# Patient Record
Sex: FEMALE | Race: WHITE | NOT HISPANIC OR LATINO | Employment: FULL TIME | URBAN - METROPOLITAN AREA
[De-identification: names, ages, dates, MRNs, and addresses within clinical notes are randomized per-mention and may not be internally consistent; named-entity substitution may affect disease eponyms.]

---

## 2017-07-14 ENCOUNTER — COMMUNICATION - HEALTHEAST (OUTPATIENT)
Dept: FAMILY MEDICINE | Facility: CLINIC | Age: 27
End: 2017-07-14

## 2017-09-29 ENCOUNTER — COMMUNICATION - HEALTHEAST (OUTPATIENT)
Dept: FAMILY MEDICINE | Facility: CLINIC | Age: 27
End: 2017-09-29

## 2017-10-02 ENCOUNTER — COMMUNICATION - HEALTHEAST (OUTPATIENT)
Dept: TELEHEALTH | Facility: CLINIC | Age: 27
End: 2017-10-02

## 2017-10-02 ENCOUNTER — RECORDS - HEALTHEAST (OUTPATIENT)
Dept: GENERAL RADIOLOGY | Facility: CLINIC | Age: 27
End: 2017-10-02

## 2017-10-02 ENCOUNTER — OFFICE VISIT - HEALTHEAST (OUTPATIENT)
Dept: FAMILY MEDICINE | Facility: CLINIC | Age: 27
End: 2017-10-02

## 2017-10-02 DIAGNOSIS — R07.9 CHEST PAIN, UNSPECIFIED TYPE: ICD-10-CM

## 2017-10-02 DIAGNOSIS — R07.9 CHEST PAIN, UNSPECIFIED: ICD-10-CM

## 2017-10-02 ASSESSMENT — MIFFLIN-ST. JEOR: SCORE: 1407.62

## 2017-10-15 ENCOUNTER — COMMUNICATION - HEALTHEAST (OUTPATIENT)
Dept: FAMILY MEDICINE | Facility: CLINIC | Age: 27
End: 2017-10-15

## 2018-04-11 ENCOUNTER — RECORDS - HEALTHEAST (OUTPATIENT)
Dept: ADMINISTRATIVE | Facility: OTHER | Age: 28
End: 2018-04-11

## 2018-09-10 ENCOUNTER — COMMUNICATION - HEALTHEAST (OUTPATIENT)
Dept: FAMILY MEDICINE | Facility: CLINIC | Age: 28
End: 2018-09-10

## 2018-09-12 ENCOUNTER — OFFICE VISIT - HEALTHEAST (OUTPATIENT)
Dept: FAMILY MEDICINE | Facility: CLINIC | Age: 28
End: 2018-09-12

## 2018-09-12 DIAGNOSIS — Z34.90 SUPERVISION OF NORMAL PREGNANCY: ICD-10-CM

## 2018-09-12 DIAGNOSIS — N91.2 AMENORRHEA: ICD-10-CM

## 2018-09-12 LAB — HCG UR QL: POSITIVE

## 2018-10-02 ENCOUNTER — RECORDS - HEALTHEAST (OUTPATIENT)
Dept: ADMINISTRATIVE | Facility: OTHER | Age: 28
End: 2018-10-02

## 2018-10-05 ENCOUNTER — HOSPITAL ENCOUNTER (OUTPATIENT)
Dept: ULTRASOUND IMAGING | Facility: CLINIC | Age: 28
Discharge: HOME | End: 2018-10-05
Attending: FAMILY MEDICINE

## 2018-10-05 DIAGNOSIS — Z34.90 SUPERVISION OF NORMAL PREGNANCY: ICD-10-CM

## 2018-10-20 ENCOUNTER — RECORDS - HEALTHEAST (OUTPATIENT)
Dept: ADMINISTRATIVE | Facility: OTHER | Age: 28
End: 2018-10-20

## 2018-11-02 ENCOUNTER — PRENATAL OFFICE VISIT - HEALTHEAST (OUTPATIENT)
Dept: FAMILY MEDICINE | Facility: CLINIC | Age: 28
End: 2018-11-02

## 2018-11-02 DIAGNOSIS — Z34.90 SUPERVISION OF NORMAL PREGNANCY: ICD-10-CM

## 2018-11-02 LAB
ALBUMIN UR-MCNC: NEGATIVE MG/DL
APPEARANCE UR: CLEAR
BASOPHILS # BLD AUTO: 0 THOU/UL (ref 0–0.2)
BASOPHILS NFR BLD AUTO: 0 % (ref 0–2)
BILIRUB UR QL STRIP: NEGATIVE
COLOR UR AUTO: YELLOW
EOSINOPHIL # BLD AUTO: 0.1 THOU/UL (ref 0–0.4)
EOSINOPHIL NFR BLD AUTO: 1 % (ref 0–6)
ERYTHROCYTE [DISTWIDTH] IN BLOOD BY AUTOMATED COUNT: 13.2 % (ref 11–14.5)
GLUCOSE UR STRIP-MCNC: NEGATIVE MG/DL
HCT VFR BLD AUTO: 40.4 % (ref 35–47)
HGB BLD-MCNC: 13.4 G/DL (ref 12–16)
HGB UR QL STRIP: NEGATIVE
HIV 1+2 AB+HIV1 P24 AG SERPL QL IA: NEGATIVE
KETONES UR STRIP-MCNC: NEGATIVE MG/DL
LEUKOCYTE ESTERASE UR QL STRIP: NEGATIVE
LYMPHOCYTES # BLD AUTO: 2.1 THOU/UL (ref 0.8–4.4)
LYMPHOCYTES NFR BLD AUTO: 26 % (ref 20–40)
MCH RBC QN AUTO: 28.5 PG (ref 27–34)
MCHC RBC AUTO-ENTMCNC: 33.2 G/DL (ref 32–36)
MCV RBC AUTO: 86 FL (ref 80–100)
MONOCYTES # BLD AUTO: 0.8 THOU/UL (ref 0–0.9)
MONOCYTES NFR BLD AUTO: 9 % (ref 2–10)
NEUTROPHILS # BLD AUTO: 5.2 THOU/UL (ref 2–7.7)
NEUTROPHILS NFR BLD AUTO: 63 % (ref 50–70)
NITRATE UR QL: NEGATIVE
PH UR STRIP: 7 [PH] (ref 5–8)
PLATELET # BLD AUTO: 259 THOU/UL (ref 140–440)
PMV BLD AUTO: 10.7 FL (ref 8.5–12.5)
RBC # BLD AUTO: 4.7 MILL/UL (ref 3.8–5.4)
SP GR UR STRIP: 1.01 (ref 1–1.03)
TSH SERPL DL<=0.005 MIU/L-ACNC: 2 UIU/ML (ref 0.3–5)
UROBILINOGEN UR STRIP-ACNC: NORMAL
WBC: 8.3 THOU/UL (ref 4–11)

## 2018-11-03 LAB
ANTIBODY SCREEN: NEGATIVE
BACTERIA SPEC CULT: NO GROWTH
HBV SURFACE AG SERPL QL IA: NEGATIVE
T PALLIDUM AB SER QL: NEGATIVE

## 2018-11-05 LAB
25(OH)D3 SERPL-MCNC: 27.6 NG/ML (ref 30–80)
ABO/RH(D): NORMAL
ABORH REPEAT: NORMAL
C TRACH DNA SPEC QL PROBE+SIG AMP: NEGATIVE
HPV SOURCE: NORMAL
HUMAN PAPILLOMA VIRUS 16 DNA: NEGATIVE
HUMAN PAPILLOMA VIRUS 18 DNA: NEGATIVE
HUMAN PAPILLOMA VIRUS FINAL DIAGNOSIS: NORMAL
HUMAN PAPILLOMA VIRUS OTHER HR: NEGATIVE
N GONORRHOEA DNA SPEC QL NAA+PROBE: NEGATIVE
RUBV IGG SERPL QL IA: POSITIVE
SPECIMEN DESCRIPTION: NORMAL

## 2018-11-07 ENCOUNTER — COMMUNICATION - HEALTHEAST (OUTPATIENT)
Dept: FAMILY MEDICINE | Facility: CLINIC | Age: 28
End: 2018-11-07

## 2018-11-12 ENCOUNTER — COMMUNICATION - HEALTHEAST (OUTPATIENT)
Dept: FAMILY MEDICINE | Facility: CLINIC | Age: 28
End: 2018-11-12

## 2018-11-30 ENCOUNTER — RECORDS - HEALTHEAST (OUTPATIENT)
Dept: ADMINISTRATIVE | Facility: OTHER | Age: 28
End: 2018-11-30

## 2018-11-30 ENCOUNTER — PRENATAL OFFICE VISIT - HEALTHEAST (OUTPATIENT)
Dept: FAMILY MEDICINE | Facility: CLINIC | Age: 28
End: 2018-11-30

## 2018-11-30 ENCOUNTER — COMMUNICATION - HEALTHEAST (OUTPATIENT)
Dept: FAMILY MEDICINE | Facility: CLINIC | Age: 28
End: 2018-11-30

## 2018-11-30 DIAGNOSIS — Z34.82 ENCOUNTER FOR SUPERVISION OF OTHER NORMAL PREGNANCY IN SECOND TRIMESTER: ICD-10-CM

## 2018-12-03 ENCOUNTER — AMBULATORY - HEALTHEAST (OUTPATIENT)
Dept: MATERNAL FETAL MEDICINE | Facility: HOSPITAL | Age: 28
End: 2018-12-03

## 2018-12-03 DIAGNOSIS — Z34.90 PRENATAL CARE, ANTEPARTUM: ICD-10-CM

## 2018-12-05 ENCOUNTER — COMMUNICATION - HEALTHEAST (OUTPATIENT)
Dept: FAMILY MEDICINE | Facility: CLINIC | Age: 28
End: 2018-12-05

## 2018-12-08 ENCOUNTER — COMMUNICATION - HEALTHEAST (OUTPATIENT)
Dept: SCHEDULING | Facility: CLINIC | Age: 28
End: 2018-12-08

## 2018-12-17 ENCOUNTER — AMBULATORY - HEALTHEAST (OUTPATIENT)
Dept: MATERNAL FETAL MEDICINE | Facility: HOSPITAL | Age: 28
End: 2018-12-17

## 2018-12-21 ENCOUNTER — RECORDS - HEALTHEAST (OUTPATIENT)
Dept: ADMINISTRATIVE | Facility: OTHER | Age: 28
End: 2018-12-21

## 2018-12-21 ENCOUNTER — RECORDS - HEALTHEAST (OUTPATIENT)
Dept: ULTRASOUND IMAGING | Facility: HOSPITAL | Age: 28
End: 2018-12-21

## 2018-12-21 ENCOUNTER — OFFICE VISIT - HEALTHEAST (OUTPATIENT)
Dept: MATERNAL FETAL MEDICINE | Facility: HOSPITAL | Age: 28
End: 2018-12-21

## 2018-12-21 DIAGNOSIS — Z34.90 ENCOUNTER FOR SUPERVISION OF NORMAL PREGNANCY, UNSPECIFIED, UNSPECIFIED TRIMESTER: ICD-10-CM

## 2018-12-21 DIAGNOSIS — Z36.86 ENCOUNTER FOR ANTENATAL SCREENING FOR CERVICAL LENGTH: ICD-10-CM

## 2018-12-21 DIAGNOSIS — O09.892 HISTORY OF PRETERM DELIVERY, CURRENTLY PREGNANT IN SECOND TRIMESTER: ICD-10-CM

## 2018-12-26 ENCOUNTER — COMMUNICATION - HEALTHEAST (OUTPATIENT)
Dept: FAMILY MEDICINE | Facility: CLINIC | Age: 28
End: 2018-12-26

## 2018-12-28 ENCOUNTER — RECORDS - HEALTHEAST (OUTPATIENT)
Dept: ADMINISTRATIVE | Facility: OTHER | Age: 28
End: 2018-12-28

## 2018-12-31 ENCOUNTER — PRENATAL OFFICE VISIT - HEALTHEAST (OUTPATIENT)
Dept: FAMILY MEDICINE | Facility: CLINIC | Age: 28
End: 2018-12-31

## 2018-12-31 DIAGNOSIS — Z34.82 ENCOUNTER FOR SUPERVISION OF OTHER NORMAL PREGNANCY IN SECOND TRIMESTER: ICD-10-CM

## 2018-12-31 DIAGNOSIS — M25.531 BILATERAL WRIST PAIN: ICD-10-CM

## 2018-12-31 DIAGNOSIS — M25.532 BILATERAL WRIST PAIN: ICD-10-CM

## 2019-01-24 ENCOUNTER — OFFICE VISIT - HEALTHEAST (OUTPATIENT)
Dept: FAMILY MEDICINE | Facility: CLINIC | Age: 29
End: 2019-01-24

## 2019-01-24 DIAGNOSIS — R39.9 UTI SYMPTOMS: ICD-10-CM

## 2019-01-24 LAB
ALBUMIN UR-MCNC: NEGATIVE MG/DL
AMORPH CRY #/AREA URNS HPF: ABNORMAL /[HPF]
APPEARANCE UR: ABNORMAL
BACTERIA #/AREA URNS HPF: ABNORMAL HPF
BILIRUB UR QL STRIP: NEGATIVE
COLOR UR AUTO: YELLOW
GLUCOSE UR STRIP-MCNC: NEGATIVE MG/DL
HGB UR QL STRIP: NEGATIVE
KETONES UR STRIP-MCNC: NEGATIVE MG/DL
LEUKOCYTE ESTERASE UR QL STRIP: ABNORMAL
MUCOUS THREADS #/AREA URNS LPF: ABNORMAL LPF
NITRATE UR QL: NEGATIVE
PH UR STRIP: 7 [PH] (ref 5–8)
RBC #/AREA URNS AUTO: ABNORMAL HPF
SP GR UR STRIP: 1.02 (ref 1–1.03)
SQUAMOUS #/AREA URNS AUTO: ABNORMAL LPF
UROBILINOGEN UR STRIP-ACNC: ABNORMAL
WBC #/AREA URNS AUTO: ABNORMAL HPF

## 2019-01-24 ASSESSMENT — MIFFLIN-ST. JEOR: SCORE: 1536.89

## 2019-01-25 LAB — BACTERIA SPEC CULT: NO GROWTH

## 2019-01-28 ENCOUNTER — PRENATAL OFFICE VISIT - HEALTHEAST (OUTPATIENT)
Dept: FAMILY MEDICINE | Facility: CLINIC | Age: 29
End: 2019-01-28

## 2019-01-28 DIAGNOSIS — Z34.82 ENCOUNTER FOR SUPERVISION OF OTHER NORMAL PREGNANCY IN SECOND TRIMESTER: ICD-10-CM

## 2019-01-28 DIAGNOSIS — O28.3 ABNORMAL FETAL ULTRASOUND OF SPINE: ICD-10-CM

## 2019-01-28 LAB
ALBUMIN UR-MCNC: NEGATIVE MG/DL
AMORPH CRY #/AREA URNS HPF: ABNORMAL /[HPF]
APPEARANCE UR: ABNORMAL
BACTERIA #/AREA URNS HPF: ABNORMAL HPF
BILIRUB UR QL STRIP: NEGATIVE
COLOR UR AUTO: YELLOW
FASTING STATUS PATIENT QL REPORTED: NORMAL
GLUCOSE 1H P 50 G GLC PO SERPL-MCNC: 124 MG/DL (ref 70–139)
GLUCOSE UR STRIP-MCNC: NEGATIVE MG/DL
HGB BLD-MCNC: 11.5 G/DL (ref 12–16)
HGB UR QL STRIP: NEGATIVE
KETONES UR STRIP-MCNC: NEGATIVE MG/DL
LEUKOCYTE ESTERASE UR QL STRIP: ABNORMAL
MUCOUS THREADS #/AREA URNS LPF: ABNORMAL LPF
NITRATE UR QL: NEGATIVE
PH UR STRIP: 7 [PH] (ref 5–8)
RBC #/AREA URNS AUTO: ABNORMAL HPF
SP GR UR STRIP: 1.01 (ref 1–1.03)
SQUAMOUS #/AREA URNS AUTO: ABNORMAL LPF
UROBILINOGEN UR STRIP-ACNC: ABNORMAL
WBC #/AREA URNS AUTO: ABNORMAL HPF

## 2019-01-29 LAB — BACTERIA SPEC CULT: NO GROWTH

## 2019-01-30 LAB — 25(OH)D3 SERPL-MCNC: 38.8 NG/ML (ref 30–80)

## 2019-02-01 ENCOUNTER — HOSPITAL ENCOUNTER (OUTPATIENT)
Dept: ULTRASOUND IMAGING | Facility: CLINIC | Age: 29
Discharge: HOME | End: 2019-02-01
Attending: FAMILY MEDICINE

## 2019-02-01 DIAGNOSIS — O28.3 ABNORMAL FETAL ULTRASOUND OF SPINE: ICD-10-CM

## 2019-02-01 DIAGNOSIS — O28.9 UNSPECIFIED ABNORMAL FINDINGS ON ANTENATAL SCREENING OF MOTHER: ICD-10-CM

## 2019-02-01 DIAGNOSIS — Z34.82 ENCOUNTER FOR SUPERVISION OF OTHER NORMAL PREGNANCY IN SECOND TRIMESTER: ICD-10-CM

## 2019-02-20 ENCOUNTER — PRENATAL OFFICE VISIT - HEALTHEAST (OUTPATIENT)
Dept: FAMILY MEDICINE | Facility: CLINIC | Age: 29
End: 2019-02-20

## 2019-02-20 DIAGNOSIS — O40.3XX0 POLYHYDRAMNIOS IN THIRD TRIMESTER COMPLICATION, SINGLE OR UNSPECIFIED FETUS: ICD-10-CM

## 2019-02-20 DIAGNOSIS — Z34.83 ENCOUNTER FOR SUPERVISION OF OTHER NORMAL PREGNANCY IN THIRD TRIMESTER: ICD-10-CM

## 2019-02-21 LAB — T PALLIDUM AB SER QL: NEGATIVE

## 2019-03-01 ENCOUNTER — HOSPITAL ENCOUNTER (OUTPATIENT)
Dept: ULTRASOUND IMAGING | Facility: CLINIC | Age: 29
Discharge: HOME | End: 2019-03-01
Attending: FAMILY MEDICINE

## 2019-03-01 ENCOUNTER — COMMUNICATION - HEALTHEAST (OUTPATIENT)
Dept: FAMILY MEDICINE | Facility: CLINIC | Age: 29
End: 2019-03-01

## 2019-03-01 DIAGNOSIS — O40.3XX0 POLYHYDRAMNIOS IN THIRD TRIMESTER COMPLICATION, SINGLE OR UNSPECIFIED FETUS: ICD-10-CM

## 2019-03-08 ENCOUNTER — AMBULATORY - HEALTHEAST (OUTPATIENT)
Dept: MATERNAL FETAL MEDICINE | Facility: HOSPITAL | Age: 29
End: 2019-03-08

## 2019-03-08 ENCOUNTER — PRENATAL OFFICE VISIT - HEALTHEAST (OUTPATIENT)
Dept: FAMILY MEDICINE | Facility: CLINIC | Age: 29
End: 2019-03-08

## 2019-03-08 DIAGNOSIS — Z34.83 ENCOUNTER FOR SUPERVISION OF OTHER NORMAL PREGNANCY IN THIRD TRIMESTER: ICD-10-CM

## 2019-03-08 DIAGNOSIS — O26.90 PREGNANCY, ANTEPARTUM, COMPLICATIONS: ICD-10-CM

## 2019-03-08 DIAGNOSIS — O40.3XX0 POLYHYDRAMNIOS AFFECTING PREGNANCY IN THIRD TRIMESTER: ICD-10-CM

## 2019-03-13 ENCOUNTER — AMBULATORY - HEALTHEAST (OUTPATIENT)
Dept: MATERNAL FETAL MEDICINE | Facility: HOSPITAL | Age: 29
End: 2019-03-13

## 2019-03-14 ENCOUNTER — COMMUNICATION - HEALTHEAST (OUTPATIENT)
Dept: SCHEDULING | Facility: CLINIC | Age: 29
End: 2019-03-14

## 2019-03-15 ENCOUNTER — RECORDS - HEALTHEAST (OUTPATIENT)
Dept: ADMINISTRATIVE | Facility: OTHER | Age: 29
End: 2019-03-15

## 2019-03-15 ENCOUNTER — OFFICE VISIT - HEALTHEAST (OUTPATIENT)
Dept: MATERNAL FETAL MEDICINE | Facility: HOSPITAL | Age: 29
End: 2019-03-15

## 2019-03-15 ENCOUNTER — RECORDS - HEALTHEAST (OUTPATIENT)
Dept: ULTRASOUND IMAGING | Facility: HOSPITAL | Age: 29
End: 2019-03-15

## 2019-03-15 ENCOUNTER — COMMUNICATION - HEALTHEAST (OUTPATIENT)
Dept: FAMILY MEDICINE | Facility: CLINIC | Age: 29
End: 2019-03-15

## 2019-03-15 DIAGNOSIS — O26.90 PREGNANCY RELATED CONDITIONS, UNSPECIFIED, UNSPECIFIED TRIMESTER: ICD-10-CM

## 2019-03-15 DIAGNOSIS — O40.9XX0 POLYHYDRAMNIOS AFFECTING PREGNANCY: ICD-10-CM

## 2019-03-15 DIAGNOSIS — O36.60X0 FETAL MACROSOMIA AFFECTING MANAGEMENT OF MOTHER, ANTEPARTUM: ICD-10-CM

## 2019-03-15 DIAGNOSIS — O35.9XX0 FETAL ABNORMALITY AFFECTING MANAGEMENT OF MOTHER, SINGLE OR UNSPECIFIED FETUS: ICD-10-CM

## 2019-03-15 DIAGNOSIS — O35.10X0 SUSPECTED CHROMOSOME ANOMALY OF FETUS AFFECTING MANAGEMENT OF MOTHER, ANTEPARTUM, SINGLE OR UNSPECIFIED FETUS: ICD-10-CM

## 2019-03-17 ENCOUNTER — COMMUNICATION - HEALTHEAST (OUTPATIENT)
Dept: SCHEDULING | Facility: CLINIC | Age: 29
End: 2019-03-17

## 2019-03-20 ENCOUNTER — OFFICE VISIT - HEALTHEAST (OUTPATIENT)
Dept: MATERNAL FETAL MEDICINE | Facility: HOSPITAL | Age: 29
End: 2019-03-20

## 2019-03-20 ENCOUNTER — RECORDS - HEALTHEAST (OUTPATIENT)
Dept: ADMINISTRATIVE | Facility: OTHER | Age: 29
End: 2019-03-20

## 2019-03-20 ENCOUNTER — TELEPHONE (OUTPATIENT)
Dept: MATERNAL FETAL MEDICINE | Facility: CLINIC | Age: 29
End: 2019-03-20

## 2019-03-20 ENCOUNTER — RECORDS - HEALTHEAST (OUTPATIENT)
Dept: ULTRASOUND IMAGING | Facility: HOSPITAL | Age: 29
End: 2019-03-20

## 2019-03-20 ENCOUNTER — TELEPHONE (OUTPATIENT)
Dept: OTOLARYNGOLOGY | Facility: CLINIC | Age: 29
End: 2019-03-20

## 2019-03-20 DIAGNOSIS — O40.9XX0 POLYHYDRAMNIOS, UNSPECIFIED TRIMESTER, NOT APPLICABLE OR UNSPECIFIED: ICD-10-CM

## 2019-03-20 DIAGNOSIS — O40.9XX0 POLYHYDRAMNIOS AFFECTING PREGNANCY: Primary | ICD-10-CM

## 2019-03-20 NOTE — TELEPHONE ENCOUNTER
Writer called to talk with Jenny to discuss her upcoming appointments. Jenny is hopeful that she can keep her OB care with her already established provider and frustrated that she does not have much flexibility to make appointments. Writer informed patient we will try to best accommodate her and try to cluster appointments. Ranr emailed Jenny her appointment calender along with locations of appointments.   Janett Christiansen RN

## 2019-03-20 NOTE — TELEPHONE ENCOUNTER
Janett from [facility]aternal [facility] called to coordinate a new patient appointment for Jenny due to her fetus' diagnosis of macroglossia. Janett reports that mom has a history of omphalocele. She was also diagnosed with polyhydramnios and baby is LGA. An appointment was made with Dr. Sarabia on 4/1 at 1:30pm. Janett to communicate this appointment time and location with mom.

## 2019-03-21 ENCOUNTER — PRE VISIT (OUTPATIENT)
Dept: MATERNAL FETAL MEDICINE | Facility: CLINIC | Age: 29
End: 2019-03-21

## 2019-03-22 ENCOUNTER — PRENATAL OFFICE VISIT - HEALTHEAST (OUTPATIENT)
Dept: FAMILY MEDICINE | Facility: CLINIC | Age: 29
End: 2019-03-22

## 2019-03-22 ENCOUNTER — TELEPHONE (OUTPATIENT)
Dept: MATERNAL FETAL MEDICINE | Facility: CLINIC | Age: 29
End: 2019-03-22

## 2019-03-22 DIAGNOSIS — O35.9XX0 FETAL ABNORMALITY AFFECTING MANAGEMENT OF MOTHER, SINGLE OR UNSPECIFIED FETUS: ICD-10-CM

## 2019-03-22 DIAGNOSIS — O40.3XX0 POLYHYDRAMNIOS IN THIRD TRIMESTER COMPLICATION, SINGLE OR UNSPECIFIED FETUS: ICD-10-CM

## 2019-03-22 DIAGNOSIS — O36.63X0 MACROSOMIA OF FETUS AFFECTING MANAGEMENT OF MOTHER IN THIRD TRIMESTER, SINGLE OR UNSPECIFIED FETUS: ICD-10-CM

## 2019-03-22 DIAGNOSIS — O09.93 SUPERVISION OF HIGH RISK PREGNANCY IN THIRD TRIMESTER: ICD-10-CM

## 2019-03-22 NOTE — TELEPHONE ENCOUNTER
spoke with Jenny about appointment on 3/25 at 11:00 am with Pediatric Genetics in the [facility] on the 12th Floor.  Writer also emailed map to Marylin.  Writer said [facility] staff would walk patient to appointment if she came to [facility] first.  Patient to also receive free parking. Patient will look this afternoon and make she received email at [email or web address] and will let us know if she has any further questions and/or concerns. Erin Bell RN

## 2019-03-25 ENCOUNTER — OFFICE VISIT (OUTPATIENT)
Dept: CONSULT | Facility: CLINIC | Age: 29
End: 2019-03-25
Attending: MEDICAL GENETICS
Payer: COMMERCIAL

## 2019-03-25 ENCOUNTER — OFFICE VISIT (OUTPATIENT)
Dept: CONSULT | Facility: CLINIC | Age: 29
End: 2019-03-25
Attending: GENETIC COUNSELOR, MS
Payer: COMMERCIAL

## 2019-03-25 VITALS
SYSTOLIC BLOOD PRESSURE: 134 MMHG | WEIGHT: 195.55 LBS | BODY MASS INDEX: 33.38 KG/M2 | RESPIRATION RATE: 20 BRPM | DIASTOLIC BLOOD PRESSURE: 59 MMHG | HEIGHT: 64 IN | HEART RATE: 92 BPM

## 2019-03-25 DIAGNOSIS — Z87.763 HISTORY OF OMPHALOCELE: ICD-10-CM

## 2019-03-25 DIAGNOSIS — O36.63X0 MACROSOMIA OF FETUS AFFECTING MANAGEMENT OF MOTHER IN THIRD TRIMESTER, SINGLE OR UNSPECIFIED FETUS: ICD-10-CM

## 2019-03-25 DIAGNOSIS — Z87.763 HISTORY OF OMPHALOCELE: Primary | ICD-10-CM

## 2019-03-25 DIAGNOSIS — O40.3XX0 POLYHYDRAMNIOS IN THIRD TRIMESTER COMPLICATION, SINGLE OR UNSPECIFIED FETUS: ICD-10-CM

## 2019-03-25 DIAGNOSIS — Q87.3 BECKWITH-WIEDEMANN SYNDROME: ICD-10-CM

## 2019-03-25 DIAGNOSIS — Q87.3 BECKWITH-WIEDEMANN SYNDROME: Primary | ICD-10-CM

## 2019-03-25 LAB
CALCIUM UR-MCNC: 17.2 MG/DL
CALCIUM/CREAT UR: 0.44 G/G CR
CREAT UR-MCNC: 39 MG/DL

## 2019-03-25 PROCEDURE — 82340 ASSAY OF CALCIUM IN URINE: CPT | Performed by: MEDICAL GENETICS

## 2019-03-25 PROCEDURE — G0463 HOSPITAL OUTPT CLINIC VISIT: HCPCS | Mod: ZF

## 2019-03-25 PROCEDURE — 40000072 ZZH STATISTIC GENETIC COUNSELING, < 16 MIN: Mod: ZF | Performed by: GENETIC COUNSELOR, MS

## 2019-03-25 RX ORDER — PRENATAL VIT/IRON FUM/FOLIC AC 27MG-0.8MG
1 TABLET ORAL DAILY
COMMUNITY

## 2019-03-25 ASSESSMENT — MIFFLIN-ST. JEOR: SCORE: 1591

## 2019-03-25 ASSESSMENT — PAIN SCALES - GENERAL: PAINLEVEL: MODERATE PAIN (4)

## 2019-03-25 NOTE — LETTER
3/25/2019      RE: Jenny Santiago  [address]  [facility] [address]       GENETICS CLINIC CONSULTATION     Name:  Jenny Santiago  :   1990  MRN:   [id number]  Date of service: Mar 25, 2019  Primary Provider: No primary care provider on file.  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  Jenny came to this appointment alone. She also saw our genetic counselor at this visit.       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  She does not have any reports of body asymmetry and on physical exam there was not the type of royal-hyperplasia which is often described.  Similarly she does not have the very tall features that can accompany this.  However, given the history of large tongue and omphalocele she would meet clinical criteria though she has not had molecular testing done.    With her child reported to have increased fluid and a large tongue I discussed with her that most cases of Kenyatta What Cheer syndrome or not inherited.  However, changes in the CDKN1C gene would be inherited and could be responsible.  However, it is 1 of the least likely mechanisms and I would recommend full testing with methylation testing as the initial step that is much more likely to lead to a diagnosis.  Similarly, with a recent release of new international guidelines for management of Kenyatta Rosalba based on molecular etiology, it would be important to know whether we can identify a genetic change in her so that we can better look for her child or remove them from the surveillance guidelines.  I discussed with her that it was very  reassuring that the ultrasounds have not shown any abdominal wall defects in the child.  We discussed management options for when the child is born such as monitoring for hyperinsulinemia and low glucoses as well as possible difficulties with feeding and breathing if the tongue continues to be very large.  I discussed with her pros and cons of delivering here at the [facility] versus delivering at a different hospital.  With her permission request I will discuss her case with her obstetrician.    Addendum: I did discuss the case.  We discussed what was available in terms of glucose, insulin monitoring, endocrinology involvement if this sugars were low and the insulin levels were normal to high in the need for possible diazoxide.  We also discussed the possible need for ENT evaluation depending on size of the tongue as the pregnancy progresses.    At the moment I would recommend genetic testing on Marylin.  We discussed that those results can take 6 to 8 weeks.  In the meantime given her clinical diagnosis of BWS I would recommend that she have a renal ultrasound as well as a urine calcium to creatinine ratio.  We discussed that the renal ultrasound can be obtained the next time she comes in for imaging or could even be obtained after delivery of her kidney function is normal.    Plan:    1. Genetic counseling consultation with [facility] to obtain a pedigree and for genetic counseling regarding Kenyatta Wiedemann testing.   2. BWS testing pending insurance authorization  3. BRETT  4. Urine Ca/Cr ratio; will need to be obtained annually.  Addendum this level was only slightly increased.  I am not certain the effect of pregnancy on the ratio but do not feel that this ratio is clinically actionable at this time other than continue to recommend a renal ultrasound.  We will consider this to be one value and we will check again after delivery.  5. Follow-up pending lab results of above.   6.  I discussed this case with  her managing obstetrician.  I gave my contact information and the on-call contact information to the provider and to the patient is were happy to discuss any complications or need for testing may arise in her delivery.  7.  No special precautions will be taken for Marylin's delivery.  However when the infant is born if there is a significant large tongue ENT may be needed if there are difficulties with feeding or breathing.  Similarly, I would monitor blood glucoses closely as there may be a risk for significant hypoglycemia with hyperinsulinemia.  -----    History of Present Illness:  Jenny is a 28 year old female referred to the genetics clinic for evaluation of a clinical diagnosis of Kenyatta Weidemann syndrome when she was a child based on her having an omphalocele and a larger tongue.  She is not known to have had a molecular diagnosis given how long ago it was.  She did not report she was on tumor screening.  When she was young she did not have body asymmetry and does not have significant asymmetry at this time.  She did have a larger tongue but eventually grew into it without need for surgery.  She is not currently following anybody for her BWS, she does not have a known renal ultrasound or renal anomalies with ordered.  She has not had the annual or biannual urinary calcium screening.    Marylin is currently pregnant and prenatal ultrasounds have shown polyhydramnios with a large tongue on the child.  It does not appear that there is a significant abdominal wall defect.  Given her personal history and raise the possibility for an inherited form of BWS.  As such they were referred to genetics for counseling and evaluation.  She has had a previous pregnancy without complications other than early delivery.       Review of available medical records:  [facility] notes reviewed.  Genetic counseling from the [facility] counselor reviewed.  Ultrasounds reviewed.    Imaging results:   Prenatal ultrasounds reviewed.  I could not  identify a renal ultrasound of the patient.    Past Medical History:  BWS  Pregnancy    Surgical History:  Omphalocele repair    Review of Systems:  Constitutional: No current illnesses or concerns other than the pregnancy  Eyes: negative - normal vision  Ears/Nose/Throat: negative - normal hearing.  Was born with a larger tongue but did not have to have a revision  Respiratory: negative  Cardiovascular: negative  Gastrointestinal: negative  Genitourinary: negative  Hematologic/Lymphatic: negative  Allergy/Immunologic: negative  Musculoskeletal: negative  Endocrine: negative  Integument: negative  Neurologic: negative  Psychiatric: negative    Remainder of comprehensive review of systems is complete and negative.    Personal History  Family History:    A detailed pedigree was obtained by the genetic counselor at the time of this appointment and will be sent for scanning into the electronic medical record. I personally reviewed and discussed the pedigree with the [facility] and the family and concur with the [facility] note. Please refer to the formal pedigree for full details.  Per [facility] note:  She is somewhat unclear on her parental heights.  She states she is not tolerating either of them.  States mother is around 5 foot that dad is slightly taller.      Jenny was born with an omphalocele. This was repaired in childhood and the patient reports that she's otherwise been healthy.    Fetal findings on today's ultrasound include polyhydramnios, large for gestational age and macroglossia.    Otherwise, the reported family history is negative for multiple miscarriages, stillbirths, birth defects, mental retardation, known genetic conditions, and consanguinity.      Social History:  Social History     Socioeconomic History     Marital status:      Spouse name: Not on file     Number of children: Not on file     Years of education: Not on file     Highest education level: Not on file   Occupational History     Not on file  "  Social Needs     Financial resource strain: Not on file     Food insecurity:     Worry: Not on file     Inability: Not on file     Transportation needs:     Medical: Not on file     Non-medical: Not on file   Tobacco Use     Smoking status: Never Smoker     Smokeless tobacco: Never Used   Substance and Sexual Activity     Alcohol use: Not Currently     Drug use: Never     Sexual activity: Not on file   Lifestyle     Physical activity:     Days per week: Not on file     Minutes per session: Not on file     Stress: Not on file   Relationships     Social connections:     Talks on phone: Not on file     Gets together: Not on file     Attends Religion service: Not on file     Active member of club or organization: Not on file     Attends meetings of clubs or organizations: Not on file     Relationship status: Not on file     Intimate partner violence:     Fear of current or ex partner: Not on file     Emotionally abused: Not on file     Physically abused: Not on file     Forced sexual activity: Not on file   Other Topics Concern     Parent/sibling w/ CABG, MI or angioplasty before 65F 55M? Not Asked   Social History Narrative     Not on file       Lives with partner in [place], [place].    I have reviewed Jenny s past medical history, family history, social history, medications and allergies as documented in the electronic medical record.  There were no additional findings except as noted.    Medications:  Current Outpatient Medications   Medication Sig Dispense Refill     Cholecalciferol (VITAMIN D PO) 5000 units daily.       Prenatal Vit-Fe Fumarate-FA (PRENATAL MULTIVITAMIN W/IRON) 27-0.8 MG tablet Take 1 tablet by mouth daily         Allergies:  Allergies   Allergen Reactions     Penicillins Rash     Family history-Son and sister.       Physical Examination:  Blood pressure 134/59, pulse 92, resp. rate 20, height 5' 3.62\" (161.6 cm), weight 195 lb 8.8 oz (88.7 kg), last menstrual period " "08/09/2018.  Weight %tile:  Wt Readings from Last 3 Encounters:   03/25/19 195 lb 8.8 oz (88.7 kg)     Height %tile:   Ht Readings from Last 3 Encounters:   03/25/19 5' 3.62\" (161.6 cm)     Head Circumference %tile:   HC Readings from Last 3 Encounters:   No data found for HC     BMI %tile: Normalized BMI data available only for age 0 to 20 years.    Constitutional: This was a well-developed, well nourished, pregnant  female who responded appropriately to all requests during the examination.    Head and Neck:  She had hair of normal texture and distribution and her head was proportionate in appearance.  The face was symmetric and did not have dysmorphic features.   Eyes:  The pupils were equal, round, and reacted to light.   The conjunctivae were clear.   Ears:  Her ears were normal in architecture and placement.  No posterior ear creases noted  Nose: The nose was clear.    Mouth and Throat: Lips and mouth are normally structured with no evident macroglossia  Respiratory: The chest was clear to auscultation and had a symmetric appearance.    Cardiovascular:  On examination of the heart, the rhythm was regular and there was no murmur.  The peripheral pulses were normal.    Gastrointestinal: Limited exam secondary to pregnancy.  No abnormal tenderness.  : I deferred a  examination.   Musculoskeletal: There was a full range of motion on the extremity exam, and normal muscular volume and bulk. There was no evidence of scoliosis.  No significant body asymmetry  Neurologic: The neurologic exam was normal, with normal cranial nerves, normal deep tendon reflexes, and a normal gait. She had normal tone.   Integument: The skin was normal with no rashes or unusual pigmentation. The dentition was normal.     Total time of 60 minutes spent face-to-face with 45 minutes (>50%) spent in counseling and/or coordination of care.      Bunny Rios MD/PhD  Division of Genetics and Metabolism  Department of Pediatrics  [facility]    Routed to family in [facility]  Also to Alton Angel

## 2019-03-25 NOTE — PATIENT INSTRUCTIONS
Genetics  [facility]     Call if any general or medical questions arise - contact our nurse coordinator Jessica Stark at [phone]    If you had genetic testing, you can contact the genetic counselor who saw you if you have further questions.    Scheduling: [phone]

## 2019-03-25 NOTE — PROGRESS NOTES
GENETICS CLINIC CONSULTATION     Name:  Jenny Santiago  :   1990  MRN:   [id number]  Date of service: Mar 25, 2019  Primary Provider: No primary care provider on file.  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  Jenny came to this appointment alone. She also saw our genetic counselor at this visit.       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  She does not have any reports of body asymmetry and on physical exam there was not the type of royal-hyperplasia which is often described.  Similarly she does not have the very tall features that can accompany this.  However, given the history of large tongue and omphalocele she would meet clinical criteria though she has not had molecular testing done.    With her child reported to have increased fluid and a large tongue I discussed with her that most cases of Kenyatta Rosalba syndrome or not inherited.  However, changes in the CDKN1C gene would be inherited and could be responsible.  However, it is 1 of the least likely mechanisms and I would recommend full testing with methylation testing as the initial step that is much more likely to lead to a diagnosis.  Similarly, with a recent release of new international guidelines for management of Kenyatta [place] based on molecular etiology, it would be important to know whether we can identify a genetic change in her so that we can better look for her child or remove them from the surveillance guidelines.  I discussed with her that it was very reassuring that the ultrasounds have not shown any abdominal wall defects in the child.  We  discussed management options for when the child is born such as monitoring for hyperinsulinemia and low glucoses as well as possible difficulties with feeding and breathing if the tongue continues to be very large.  I discussed with her pros and cons of delivering here at the [facility] versus delivering at a different hospital.  With her permission request I will discuss her case with her obstetrician.    Addendum: I did discuss the case.  We discussed what was available in terms of glucose, insulin monitoring, endocrinology involvement if this sugars were low and the insulin levels were normal to high in the need for possible diazoxide.  We also discussed the possible need for ENT evaluation depending on size of the tongue as the pregnancy progresses.    At the moment I would recommend genetic testing on Marylin.  We discussed that those results can take 6 to 8 weeks.  In the meantime given her clinical diagnosis of BWS I would recommend that she have a renal ultrasound as well as a urine calcium to creatinine ratio.  We discussed that the renal ultrasound can be obtained the next time she comes in for imaging or could even be obtained after delivery of her kidney function is normal.    Plan:    1. Genetic counseling consultation with Marilu Olivares [facility] to obtain a pedigree and for genetic counseling regarding Kenyatta Wiedemann testing.   2. BWS testing pending insurance authorization  3. BRETT  4. Urine Ca/Cr ratio; will need to be obtained annually.  Addendum this level was only slightly increased.  I am not certain the effect of pregnancy on the ratio but do not feel that this ratio is clinically actionable at this time other than continue to recommend a renal ultrasound.  We will consider this to be one value and we will check again after delivery.  5. Follow-up pending lab results of above.   6.  I discussed this case with her managing obstetrician.  I gave my contact information and the on-call contact  information to the provider and to the patient is were happy to discuss any complications or need for testing may arise in her delivery.  7.  No special precautions will be taken for Marylin's delivery.  However when the infant is born if there is a significant large tongue ENT may be needed if there are difficulties with feeding or breathing.  Similarly, I would monitor blood glucoses closely as there may be a risk for significant hypoglycemia with hyperinsulinemia.  -----    History of Present Illness:  Jenny is a 28 year old female referred to the genetics clinic for evaluation of a clinical diagnosis of Kenyatta Weidemann syndrome when she was a child based on her having an omphalocele and a larger tongue.  She is not known to have had a molecular diagnosis given how long ago it was.  She did not report she was on tumor screening.  When she was young she did not have body asymmetry and does not have significant asymmetry at this time.  She did have a larger tongue but eventually grew into it without need for surgery.  She is not currently following anybody for her BWS, she does not have a known renal ultrasound or renal anomalies with ordered.  She has not had the annual or biannual urinary calcium screening.    Marylin is currently pregnant and prenatal ultrasounds have shown polyhydramnios with a large tongue on the child.  It does not appear that there is a significant abdominal wall defect.  Given her personal history and raise the possibility for an inherited form of BWS.  As such they were referred to genetics for counseling and evaluation.  She has had a previous pregnancy without complications other than early delivery.       Review of available medical records:  [facility] notes reviewed.  Genetic counseling from the [facility] counselor reviewed.  Ultrasounds reviewed.    Imaging results:   Prenatal ultrasounds reviewed.  I could not identify a renal ultrasound of the patient.    Past Medical  History:  BWS  Pregnancy    Surgical History:  Omphalocele repair    Review of Systems:  Constitutional: No current illnesses or concerns other than the pregnancy  Eyes: negative - normal vision  Ears/Nose/Throat: negative - normal hearing.  Was born with a larger tongue but did not have to have a revision  Respiratory: negative  Cardiovascular: negative  Gastrointestinal: negative  Genitourinary: negative  Hematologic/Lymphatic: negative  Allergy/Immunologic: negative  Musculoskeletal: negative  Endocrine: negative  Integument: negative  Neurologic: negative  Psychiatric: negative    Remainder of comprehensive review of systems is complete and negative.    Personal History  Family History:    A detailed pedigree was obtained by the genetic counselor at the time of this appointment and will be sent for scanning into the electronic medical record. I personally reviewed and discussed the pedigree with the [facility] and the family and concur with the [facility] note. Please refer to the formal pedigree for full details.  Per [facility] note:  She is somewhat unclear on her parental heights.  She states she is not tolerating either of them.  States mother is around 5 foot that dad is slightly taller.      Jenny was born with an omphalocele. This was repaired in childhood and the patient reports that she's otherwise been healthy.    Fetal findings on today's ultrasound include polyhydramnios, large for gestational age and macroglossia.    Otherwise, the reported family history is negative for multiple miscarriages, stillbirths, birth defects, mental retardation, known genetic conditions, and consanguinity.      Social History:  Social History     Socioeconomic History     Marital status:      Spouse name: Not on file     Number of children: Not on file     Years of education: Not on file     Highest education level: Not on file   Occupational History     Not on file   Social Needs     Financial resource strain: Not on file      "Food insecurity:     Worry: Not on file     Inability: Not on file     Transportation needs:     Medical: Not on file     Non-medical: Not on file   Tobacco Use     Smoking status: Never Smoker     Smokeless tobacco: Never Used   Substance and Sexual Activity     Alcohol use: Not Currently     Drug use: Never     Sexual activity: Not on file   Lifestyle     Physical activity:     Days per week: Not on file     Minutes per session: Not on file     Stress: Not on file   Relationships     Social connections:     Talks on phone: Not on file     Gets together: Not on file     Attends [employer] service: Not on file     Active member of club or organization: Not on file     Attends meetings of clubs or organizations: Not on file     Relationship status: Not on file     Intimate partner violence:     Fear of current or ex partner: Not on file     Emotionally abused: Not on file     Physically abused: Not on file     Forced sexual activity: Not on file   Other Topics Concern     Parent/sibling w/ CABG, MI or angioplasty before 65F 55M? Not Asked   Social History Narrative     Not on file       Lives with partner in [place], [place].    I have reviewed Jenny s past medical history, family history, social history, medications and allergies as documented in the electronic medical record.  There were no additional findings except as noted.    Medications:  Current Outpatient Medications   Medication Sig Dispense Refill     Cholecalciferol (VITAMIN D PO) 5000 units daily.       Prenatal Vit-Fe Fumarate-FA (PRENATAL MULTIVITAMIN W/IRON) 27-0.8 MG tablet Take 1 tablet by mouth daily         Allergies:  Allergies   Allergen Reactions     Penicillins Rash     Family history-Son and sister.       Physical Examination:  Blood pressure 134/59, pulse 92, resp. rate 20, height 5' 3.62\" (161.6 cm), weight 195 lb 8.8 oz (88.7 kg), last menstrual period 08/09/2018.  Weight %tile:  Wt Readings from Last 3 Encounters:   03/25/19 195 " "lb 8.8 oz (88.7 kg)     Height %tile:   Ht Readings from Last 3 Encounters:   03/25/19 5' 3.62\" (161.6 cm)     Head Circumference %tile:   HC Readings from Last 3 Encounters:   No data found for HC     BMI %tile: Normalized BMI data available only for age 0 to 20 years.    Constitutional: This was a well-developed, well nourished, pregnant  female who responded appropriately to all requests during the examination.    Head and Neck:  She had hair of normal texture and distribution and her head was proportionate in appearance.  The face was symmetric and did not have dysmorphic features.   Eyes:  The pupils were equal, round, and reacted to light.   The conjunctivae were clear.   Ears:  Her ears were normal in architecture and placement.  No posterior ear creases noted  Nose: The nose was clear.    Mouth and Throat: Lips and mouth are normally structured with no evident macroglossia  Respiratory: The chest was clear to auscultation and had a symmetric appearance.    Cardiovascular:  On examination of the heart, the rhythm was regular and there was no murmur.  The peripheral pulses were normal.    Gastrointestinal: Limited exam secondary to pregnancy.  No abnormal tenderness.  : I deferred a  examination.   Musculoskeletal: There was a full range of motion on the extremity exam, and normal muscular volume and bulk. There was no evidence of scoliosis.  No significant body asymmetry  Neurologic: The neurologic exam was normal, with normal cranial nerves, normal deep tendon reflexes, and a normal gait. She had normal tone.   Integument: The skin was normal with no rashes or unusual pigmentation. The dentition was normal.     Total time of 60 minutes spent face-to-face with 45 minutes (>50%) spent in counseling and/or coordination of care.      Bunny Rios MD/PhD  Division of Genetics and Metabolism  Department of Pediatrics  [facility]    Routed to family in Comm [facility]  Also to Alton Angel        "

## 2019-03-25 NOTE — LETTER
3/25/2019      RE: Jenny Santiago  [address]  [place] [address]       Presenting information: Jenny is a 28 year old female with a history of suspected Kenyatta-Wiedemann syndrome. She was seen for prenatal genetic counseling by Avani Antunez on March 15, 2019 at the [facility], and evaluated by Dr. Rios today.  I met with Jenny at the request of Dr. Rios to discuss Kenyatta-Wiedemann syndrome, and to obtain informed consent for genetic testing.     Personal History:  Jenny reports a history of Kenyatta-Wiedemann syndrome diagnosed when she was a child. She is reported to have had an omphalocele at birth.  She reports that she also had a large tongue at birth. Jenny has not had any renal screening and reports that she did not have tumor screenings when she was younger. Jenny denies a history of other health concerns. See Dr. Rios' note for additional details.     Family History: A three generation pedigree was obtained during Jenny's genetic counseling appointment on 3/15/19 and scanned into the EMR at that time. At this time the only additional information is a paternal grandfather with lung cancer. Jenny specifically denied known family history of additional omphaloceles, pregnancy losses, kidney conditions, overgrowth conditions, clefts or cardiac conditions. The following information was reported significant during Jenny's 3/15/19 appointment:  Jenny:    Jenny was born with an omphalocele. This was repaired in childhood and the patient reports that she's otherwise been healthy.    Fetal findings on 3/15/19 ultrasound include polyhydramnios, large for gestational age and macroglossia.    Otherwise, the reported family history is negative for multiple miscarriages, stillbirths, birth defects, mental retardation, known genetic conditions, and consanguinity.     Discussion: Kenyatta Wiedemann syndrome (BWS) is an  overgrowth syndrome that may affect many parts of the body.  Infants are typically larger than expected at birth and affected individuals tend to be taller than their peers in childhood.  In some individuals with BWS, specific parts on one side of the body may overgrow leading to an asymmetric appearance; this is called hemihyperplasia.  Additional symptoms may include umbilical hernias, abnormally large tongue, and abnormally large organs.  Individuals with BWS are at increased risk of developing certain types of cancer in childhood, most commonly a form of kidney cancer called Wilms tumor and a form of liver cancer called hepatoblastoma.    We discussed genetic testing for Kenyatta Wiedemann syndrome (BWS).  BWS is most often the result of abnormal methylation.  Methylation is a process that occurs early in fetal development and results in some genes being methylated or turned off.  Certain genes have expected methylation patterns and need to be turned off to ensure appropriate growth and development.  BWS syndrome results from abnormal methylation at chromosome 11p15 that inappropriately turns genes in that region on/off. This leads to overgrowth. We reviewed that changes in methylation occur and are not the result of anything either parent may have done or not done during the pregnancy.  Less commonly BWS can arise as a result a mutation or  spelling error  in the CDKN1C gene.  A change in only one copy of the CDKN1C gene is necessary to cause BWS; this is called a dominant condition.  The gene change may be inherited from one parent or be new in the affected individual.  Risks for Jenny's current pregnancy and additional pregnancies depend on the method of mutation.      Plan:  1. Testing for Kenyatta-Wiedemann syndrome. Blood was drawn and sent to the [facility] for DNA extraction. Pending insurance authorization the sample will be sent to the [facility] for  methylation and high resolution copy number analysis of 11p15.5; if negative, reflex to CDKN1C sequence analysis.   2. Return pending genetic test results and per Dr. Rios' recommendations.   3. Contact information was provided should any questions arise in the future.     Marilu Olivares MS, [facility]  Licensed Genetic Counselor  [phone]    Approximate Time Spent in Consultation: 30 minutes     CC: no letter      Marilu Olivares

## 2019-03-25 NOTE — LETTER
3/25/2019      RE: Jenny Santiago  [address]  [place] [address]       .      Bunny Rios MD

## 2019-03-25 NOTE — NURSING NOTE
"Chief Complaint   Patient presents with     Consult     BWS consult.     Vitals:    03/25/19 1051   BP: 134/59   BP Location: Right arm   Patient Position: Chair   Cuff Size: Adult Large   Pulse: 92   Resp: 20   Weight: 195 lb 8.8 oz (88.7 kg)   Height: 5' 3.62\" (161.6 cm)      Eloina Naranjo M.A.  March 25, 2019  "

## 2019-03-25 NOTE — LETTER
Date:March 27, 2019      Provider requested that no letter be sent. Do not send.       [facility] Health Information

## 2019-03-26 ENCOUNTER — RECORDS - HEALTHEAST (OUTPATIENT)
Dept: ADMINISTRATIVE | Facility: OTHER | Age: 29
End: 2019-03-26

## 2019-03-26 ENCOUNTER — HOSPITAL ENCOUNTER (OUTPATIENT)
Dept: ULTRASOUND IMAGING | Facility: CLINIC | Age: 29
End: 2019-03-26
Attending: OBSTETRICS & GYNECOLOGY
Payer: COMMERCIAL

## 2019-03-26 ENCOUNTER — HOSPITAL ENCOUNTER (OUTPATIENT)
Dept: CARDIOLOGY | Facility: CLINIC | Age: 29
Discharge: HOME | End: 2019-03-26
Attending: OBSTETRICS & GYNECOLOGY | Admitting: OBSTETRICS & GYNECOLOGY
Payer: COMMERCIAL

## 2019-03-26 ENCOUNTER — OFFICE VISIT (OUTPATIENT)
Dept: MATERNAL FETAL MEDICINE | Facility: CLINIC | Age: 29
End: 2019-03-26
Attending: OBSTETRICS & GYNECOLOGY
Payer: COMMERCIAL

## 2019-03-26 DIAGNOSIS — O40.9XX0 POLYHYDRAMNIOS AFFECTING PREGNANCY: ICD-10-CM

## 2019-03-26 DIAGNOSIS — O40.9XX0 POLYHYDRAMNIOS AFFECTING PREGNANCY: Primary | ICD-10-CM

## 2019-03-26 DIAGNOSIS — O35.9XX0 FETAL ABNORMALITY AFFECTING MANAGEMENT OF MOTHER, SINGLE OR UNSPECIFIED FETUS: ICD-10-CM

## 2019-03-26 PROCEDURE — 76825 ECHO EXAM OF FETAL HEART: CPT

## 2019-03-26 PROCEDURE — 76819 FETAL BIOPHYS PROFIL W/O NST: CPT

## 2019-03-26 NOTE — PROGRESS NOTES
Fetal Cardiology Consultation    Patient:  Jenny Santiago MRN:  [id number]   YOB: 1990 Age:  28 year old   Date of Visit:  3/26/2019 PCP:  No primary care provider on file.   SHAVONNE: 2019, by Last Menstrual Period EGA: 32w5d weeks     Dear Dr. Garcia:    I had the pleasure of seeing Jenny Santiago at the [facility] in [place] on 3/26/2019 in consultation for fetal echocardiography results. She presented today accompanied by her partner. As you know, she is a 28 year old female with personal history of omphalocoele status-post repair in infancy, now with pregnancy affected by polyhydramnios and fetal macroglossia, with concern for a genetic anomaly e.g. Kenyatta-Wiedemann Syndrome.    The fetal echocardiogram was normal. Normal fetal cardiac anatomy. No fetal cardiac tumors. Normal right and left ventricular size and function without hypertrophy. No evidence of diastolic dysfunction. No pericardial effusion. No arrhythmia.     I reviewed and interpreted the fetal echocardiogram today. I discussed the normal results with Ms. Santiago and her partner. While these results are normal, it is important to note that fetal echocardiography cannot exclude small atrial or ventricular septal defects, persistent ductus arteriosus, mild coarctation of the aorta, partial anomalous pulmonary venous return, minor anatomic valve anomalies, or coronary artery anomalies.     -- No additional fetal echocardiograms are recommended for this pregnancy.  -- A post-zev transthoracic echocardiogram is recommended to exclude subtle structural anomlies, in non-urgent fashion.  -- A post-zev 12-lead ECG is recommended given the potential association between BWS and LQTS type-1 given overlap between them in the KCNQ1 gene.    Thank you for allowing me to participate in Ms. Santiago's care. Please don't hesitate to contact me or the Fetal Cardiology team  at [facility] with any questions or concerns.     I spent a total of 15 minutes face-to-face with Ms. Santiago during today's office visit. Over 50% of this time was spent counseling the patient and/or coordinating care regarding the fetal echocardiography results.     Pradeep Cortez  Pediatric Cardiology  [facility]  Phone [phone]

## 2019-03-26 NOTE — PROGRESS NOTES
Presenting information: Jenny is a 28 year old female with a history of suspected Kenyatta-Wiedemann syndrome. She was seen for prenatal genetic counseling by Avani Antunez on March 15, 2019 at the [facility], and evaluated by Dr. Rios today.  I met with Jenny at the request of Dr. Rios to discuss Kenyatta-Wiedemann syndrome, and to obtain informed consent for genetic testing.     Personal History:  Jenny reports a history of Kenyatta-Wiedemann syndrome diagnosed when she was a child. She is reported to have had an omphalocele at birth.  She reports that she also had a large tongue at birth. Jenny has not had any renal screening and reports that she did not have tumor screenings when she was younger. Jenny denies a history of other health concerns. See Dr. Rios' note for additional details.     Family History: A three generation pedigree was obtained during Jenny's genetic counseling appointment on 3/15/19 and scanned into the EMR at that time. At this time the only additional information is a paternal grandfather with lung cancer. Jenny specifically denied known family history of additional omphaloceles, pregnancy losses, kidney conditions, overgrowth conditions, clefts or cardiac conditions. The following information was reported significant during Jenny's 3/15/19 appointment:  Jenny:    Jenny was born with an omphalocele. This was repaired in childhood and the patient reports that she's otherwise been healthy.    Fetal findings on 3/15/19 ultrasound include polyhydramnios, large for gestational age and macroglossia.    Otherwise, the reported family history is negative for multiple miscarriages, stillbirths, birth defects, mental retardation, known genetic conditions, and consanguinity.     Discussion: Kenyatta Wiedemann syndrome (BWS) is an overgrowth syndrome that may affect many parts of the body.  Infants are typically larger than  expected at birth and affected individuals tend to be taller than their peers in childhood.  In some individuals with BWS, specific parts on one side of the body may overgrow leading to an asymmetric appearance; this is called hemihyperplasia.  Additional symptoms may include umbilical hernias, abnormally large tongue, and abnormally large organs.  Individuals with BWS are at increased risk of developing certain types of cancer in childhood, most commonly a form of kidney cancer called Wilms tumor and a form of liver cancer called hepatoblastoma.    We discussed genetic testing for Kenyatta Wiedemann syndrome (BWS).  BWS is most often the result of abnormal methylation.  Methylation is a process that occurs early in fetal development and results in some genes being methylated or turned off.  Certain genes have expected methylation patterns and need to be turned off to ensure appropriate growth and development.  BWS syndrome results from abnormal methylation at chromosome 11p15 that inappropriately turns genes in that region on/off. This leads to overgrowth. We reviewed that changes in methylation occur and are not the result of anything either parent may have done or not done during the pregnancy.  Less commonly BWS can arise as a result a mutation or  spelling error  in the CDKN1C gene.  A change in only one copy of the CDKN1C gene is necessary to cause BWS; this is called a dominant condition.  The gene change may be inherited from one parent or be new in the affected individual.  Risks for Jenny's current pregnancy and additional pregnancies depend on the method of mutation.      Plan:  1. Testing for Kenyatta-Wiedemann syndrome. Blood was drawn and sent to the [facility] for DNA extraction. Pending insurance authorization the sample will be sent to the [facility] for methylation and high resolution copy number analysis of 11p15.5; if negative, reflex to CDKN1C sequence  analysis.   2. Return pending genetic test results and per Dr. Rios' recommendations.   3. Contact information was provided should any questions arise in the future.     Marilu Olivares MS, [facility]  Licensed Genetic Counselor  [phone]    Approximate Time Spent in Consultation: 30 minutes     CC: no letter

## 2019-03-29 ENCOUNTER — TELEPHONE (OUTPATIENT)
Dept: MATERNAL FETAL MEDICINE | Facility: CLINIC | Age: 29
End: 2019-03-29

## 2019-03-29 ENCOUNTER — RECORDS - HEALTHEAST (OUTPATIENT)
Dept: ADMINISTRATIVE | Facility: OTHER | Age: 29
End: 2019-03-29

## 2019-03-29 ENCOUNTER — COMMUNICATION - HEALTHEAST (OUTPATIENT)
Dept: FAMILY MEDICINE | Facility: CLINIC | Age: 29
End: 2019-03-29

## 2019-03-29 NOTE — TELEPHONE ENCOUNTER
I spoke with Dr. Rios who evaluated Jenny for possible BWS.  For Jenny, he recommended the followin) She did undergo testing for BWS, which is currently pending prior authorization.  This test takes ~ 6 weeks after being sent in, plus the time for prior authorization.  Directed testing can be completed for her baby if Jenny's test is positive.   2) She did undergo urine testing for hypercalciuria which was negative.  She should have this test repeated annually with her primary care provider (Dr. Jessica Amin).  3) A renal US was recommended and ordered, which can be scheduled non-emergently given negative hypercalciuria.  She can also coordinate this with Dr. Amin post-partum.    Regarding management of the baby:    1) Risks associated with macroglossia - baby is at risk for feeding difficulties and may require Jenny to pump and bottle feed, although breast feeding should be attempted first.  There are some small risks of respiratory compromise, but that these are uncommon in the  time period, but periods of higher risk would be if the baby had a cold/nasal congestion.  She is scheduled to see ENT on Monday, which Dr. Rios states may be informative for the patient, but can be optional.  I reviewed this with Dr. Amin, who in turn discussed this with Jenny, and she would like to continue with this appointment.  2) Risk for hypoglycemia - if the baby has BWS, the baby is at risk for  hypoglycemia.  The baby should be monitored for this closely and serially at birth for at least several hours serially, similar to a baby of a mother with poorly controlled DM.  The baby may require IV glucose for hypoglycemia, and should the baby have hypoglycemia, the baby should also have an insulin level drawn concomitantly, a hyperinsulemic hypoglycemia would be seen with BWS.  If this is the case, Peds Endocrine should be consulted for possible initiation of diazoxide.      We are  scheduled to see Jenny at our office on Monday as well for follow up US and NICU consult.      Alton Angel

## 2019-04-01 ENCOUNTER — ALLIED HEALTH/NURSE VISIT (OUTPATIENT)
Dept: MATERNAL FETAL MEDICINE | Facility: CLINIC | Age: 29
End: 2019-04-01
Attending: OBSTETRICS & GYNECOLOGY
Payer: COMMERCIAL

## 2019-04-01 ENCOUNTER — COMMUNICATION - HEALTHEAST (OUTPATIENT)
Dept: FAMILY MEDICINE | Facility: CLINIC | Age: 29
End: 2019-04-01

## 2019-04-01 ENCOUNTER — RECORDS - HEALTHEAST (OUTPATIENT)
Dept: ADMINISTRATIVE | Facility: OTHER | Age: 29
End: 2019-04-01

## 2019-04-01 ENCOUNTER — OFFICE VISIT (OUTPATIENT)
Dept: OTOLARYNGOLOGY | Facility: CLINIC | Age: 29
End: 2019-04-01
Attending: OTOLARYNGOLOGY
Payer: COMMERCIAL

## 2019-04-01 ENCOUNTER — HOSPITAL ENCOUNTER (OUTPATIENT)
Dept: ULTRASOUND IMAGING | Facility: CLINIC | Age: 29
Discharge: HOME | End: 2019-04-01
Attending: OBSTETRICS & GYNECOLOGY | Admitting: OBSTETRICS & GYNECOLOGY
Payer: COMMERCIAL

## 2019-04-01 VITALS
BODY MASS INDEX: 34.17 KG/M2 | HEART RATE: 90 BPM | WEIGHT: 196.7 LBS | RESPIRATION RATE: 20 BRPM | SYSTOLIC BLOOD PRESSURE: 131 MMHG | DIASTOLIC BLOOD PRESSURE: 70 MMHG | OXYGEN SATURATION: 97 %

## 2019-04-01 VITALS — BODY MASS INDEX: 34.16 KG/M2 | WEIGHT: 196.65 LBS

## 2019-04-01 DIAGNOSIS — O35.AXX0 PREGNANCY COMPLICATED BY FETAL FACIAL ABNORMALITY, NOT APPLICABLE OR UNSPECIFIED FETUS: Primary | ICD-10-CM

## 2019-04-01 DIAGNOSIS — O40.9XX0 POLYHYDRAMNIOS AFFECTING PREGNANCY: ICD-10-CM

## 2019-04-01 DIAGNOSIS — O40.9XX0 POLYHYDRAMNIOS AFFECTING PREGNANCY: Primary | ICD-10-CM

## 2019-04-01 DIAGNOSIS — O35.9XX0 FETAL ABNORMALITY AFFECTING MANAGEMENT OF MOTHER, SINGLE OR UNSPECIFIED FETUS: ICD-10-CM

## 2019-04-01 PROCEDURE — G0463 HOSPITAL OUTPT CLINIC VISIT: HCPCS | Mod: ZF

## 2019-04-01 PROCEDURE — 76816 OB US FOLLOW-UP PER FETUS: CPT

## 2019-04-01 PROCEDURE — G0463 HOSPITAL OUTPT CLINIC VISIT: HCPCS | Mod: 25,ZF

## 2019-04-01 PROCEDURE — 76819 FETAL BIOPHYS PROFIL W/O NST: CPT | Performed by: OBSTETRICS & GYNECOLOGY

## 2019-04-01 ASSESSMENT — PAIN SCALES - GENERAL
PAINLEVEL: NO PAIN (0)
PAINLEVEL: NO PAIN (0)

## 2019-04-01 NOTE — LETTER
2019      RE: Jenny Santiago  [address]  [place] [address]       Pediatric Otolaryngology and Facial Plastic Surgery    CC:   Chief Complaints and History of Present Illnesses   Patient presents with     Consult     New M prenatal consult macroglossia. No pain today.        Referring Provider: Brennan:  Date of Service: 19      Dear Dr. Amin,    I had the pleasure of meeting Jenny Santiago in consultation today at your request in the [facility].    HPI:  Jenny is a 28 year old female who presents for prenatal visit regarding macroglossia.  She is currently pregnant with a fetus noted to have a large tongue on ultrasound.  She is here at the request of our maternal-fetal medicine group.  She is undergoing testing for BWS.  Parents present today to discuss findings and discuss  plan.      PMH:  No past medical history on file.     PSH:  No past surgical history on file.    Medications:    Current Outpatient Medications   Medication Sig Dispense Refill     Cholecalciferol (VITAMIN D PO) 5000 units daily.       Prenatal Vit-Fe Fumarate-FA (PRENATAL MULTIVITAMIN W/IRON) 27-0.8 MG tablet Take 1 tablet by mouth daily         Allergies:   Allergies   Allergen Reactions     Penicillins Rash     Family history-Son and sister.       Social History:    Social History     Socioeconomic History     Marital status:      Spouse name: Not on file     Number of children: Not on file     Years of education: Not on file     Highest education level: Not on file   Occupational History     Not on file   Social Needs     Financial resource strain: Not on file     Food insecurity:     Worry: Not on file     Inability: Not on file     Transportation needs:     Medical: Not on file     Non-medical: Not on file   Tobacco Use     Smoking status: Never Smoker     Smokeless tobacco: Never Used   Substance and Sexual Activity     Alcohol  use: Not on file     Drug use: Not on file     Sexual activity: Not on file   Lifestyle     Physical activity:     Days per week: Not on file     Minutes per session: Not on file     Stress: Not on file   Relationships     Social connections:     Talks on phone: Not on file     Gets together: Not on file     Attends [employer] service: Not on file     Active member of club or organization: Not on file     Attends meetings of clubs or organizations: Not on file     Relationship status: Not on file     Intimate partner violence:     Fear of current or ex partner: Not on file     Emotionally abused: Not on file     Physically abused: Not on file     Forced sexual activity: Not on file   Other Topics Concern     Parent/sibling w/ CABG, MI or angioplasty before 65F 55M? Not Asked   Social History Narrative     Not on file       FAMILY HISTORY:    No family history on file.    REVIEW OF SYSTEMS:  12 point ROS obtained and was negative other than the symptoms noted above in the HPI.    PHYSICAL EXAMINATION:  Wt 196 lb 10.4 oz (89.2 kg)   LMP 2018   BMI 34.16 kg/m     Deferred    Ultrasounds reviewed.    Impressions and Recommendations:  Jenny is a 28 year old female who presents for prenatal diagnosis regarding macroglossia.  Concern for BWS.  Pending testing.  I would like to review her imaging with our maternal-fetal medicine group.  We discussed difficulties with breathing and feeding shortly after birth.  We discussed medical and surgical options.  We also discussed medical and surgical options for macroglossia.  Typically respiratory compromise is uncommon in a  with macroglossia.  However there is a small risk of respiratory compromise/issues in the  period.  We discussed medical and surgical options including positioning, nasal trumpet, intubation, tongue reduction as well as tracheostomy.  We will discuss at our upcoming maternal-fetal medicine conference.        Thank you for allowing me  to participate in the care of Jenny. Please don't hesitate to contact me.    Eddie Sarabia MD  Pediatric Otolaryngology and Facial Plastic Surgery  Department of Otolaryngology  [facility] [phone]   Pager [phone]   [email or web address]

## 2019-04-01 NOTE — NURSING NOTE
Jenny here in clinic with her significant other for a RL2/OBV/NICU and SW consult at 33w4d gestation. Her pregnancy is c/b Macroglassia and Polyhydramnios (see reports/notes). VSS. Pt reports +FM, denies LOF/Bleeding/change in discharge/cramping or ctx/HA/vision changes/SOB/chest pains. Jenny also noted +1 non pitting edema in her feet and ankles. Pt met with Dr. Luong and Jesica with SW, then toured the NICU. 1st OB Visit done, folder and PCC card given, information reviewed. Dr. Cuello in to meet with Jenny following her US to discuss POC. Jenny will have weekly BPP and RL2 in 3 weeks at [facility]. She will continue to have OB visits with her primary OB, Jessica Amin at , but will deliver at [facility]. Jenny will also meet with ENT following today's visit. Discharged ambulatory and Stable.   Janett Christiansen RN

## 2019-04-01 NOTE — NURSING NOTE
Chief Complaint   Patient presents with     Consult     New M prenatal consult macroglossia. No pain today.        Wt 196 lb 10.4 oz (89.2 kg)   LMP 08/09/2018   BMI 34.16 kg/m      LAUREN Henderson LPN

## 2019-04-01 NOTE — PATIENT INSTRUCTIONS
1.  You were seen in the ENT Clinic today by Dr. Sarabia. If you have any questions or concerns after your appointment, please call [phone].    2.  Plan is for Maternal Fetal Medicine to call you on Thursday with the team's recommendation. Dr. Sarabia will speak with them on Thursday 4/4.     Thank you!  Emma Miner  RN Care Coordinator  [facility]

## 2019-04-01 NOTE — PROGRESS NOTES
Pediatric Otolaryngology and Facial Plastic Surgery    CC:   Chief Complaints and History of Present Illnesses   Patient presents with     Consult     [facility] prenatal consult macroglossia. No pain today.        Referring Provider: Brennan:  Date of Service: 19      Dear Dr. Amin,    I had the pleasure of meeting Jenny Santiago in consultation today at your request in the [facility].    HPI:  Jenny is a 28 year old female who presents for prenatal visit regarding macroglossia.  She is currently pregnant with a fetus noted to have a large tongue on ultrasound.  She is here at the request of our maternal-fetal medicine group.  She is undergoing testing for BWS.  Parents present today to discuss findings and discuss  plan.      PMH:  No past medical history on file.     PSH:  No past surgical history on file.    Medications:    Current Outpatient Medications   Medication Sig Dispense Refill     Cholecalciferol (VITAMIN D PO) 5000 units daily.       Prenatal Vit-Fe Fumarate-FA (PRENATAL MULTIVITAMIN W/IRON) 27-0.8 MG tablet Take 1 tablet by mouth daily         Allergies:   Allergies   Allergen Reactions     Penicillins Rash     Family history-Son and sister.       Social History:    Social History     Socioeconomic History     Marital status:      Spouse name: Not on file     Number of children: Not on file     Years of education: Not on file     Highest education level: Not on file   Occupational History     Not on file   Social Needs     Financial resource strain: Not on file     Food insecurity:     Worry: Not on file     Inability: Not on file     Transportation needs:     Medical: Not on file     Non-medical: Not on file   Tobacco Use     Smoking status: Never Smoker     Smokeless tobacco: Never Used   Substance and Sexual Activity     Alcohol use: Not on file     Drug use: Not on file     Sexual activity: Not on file   Lifestyle      Physical activity:     Days per week: Not on file     Minutes per session: Not on file     Stress: Not on file   Relationships     Social connections:     Talks on phone: Not on file     Gets together: Not on file     Attends Buddhist service: Not on file     Active member of club or organization: Not on file     Attends meetings of clubs or organizations: Not on file     Relationship status: Not on file     Intimate partner violence:     Fear of current or ex partner: Not on file     Emotionally abused: Not on file     Physically abused: Not on file     Forced sexual activity: Not on file   Other Topics Concern     Parent/sibling w/ CABG, MI or angioplasty before 65F 55M? Not Asked   Social History Narrative     Not on file       FAMILY HISTORY:    No family history on file.    REVIEW OF SYSTEMS:  12 point ROS obtained and was negative other than the symptoms noted above in the HPI.    PHYSICAL EXAMINATION:  Wt 196 lb 10.4 oz (89.2 kg)   LMP 2018   BMI 34.16 kg/m    Deferred    Ultrasounds reviewed.    Impressions and Recommendations:  Jenny is a 28 year old female who presents for prenatal diagnosis regarding macroglossia.  Concern for BWS.  Pending testing.  I would like to review her imaging with our maternal-fetal medicine group.  We discussed difficulties with breathing and feeding shortly after birth.  We discussed medical and surgical options.  We also discussed medical and surgical options for macroglossia.  Typically respiratory compromise is uncommon in a  with macroglossia.  However there is a small risk of respiratory compromise/issues in the  period.  We discussed medical and surgical options including positioning, nasal trumpet, intubation, tongue reduction as well as tracheostomy.  We will discuss at our upcoming maternal-fetal medicine conference.        Thank you for allowing me to participate in the care of Jenny. Please don't hesitate to contact me.    Eddie  MD Cornell  Pediatric Otolaryngology and Facial Plastic Surgery  Department of Otolaryngology  [facility] [phone]   Pager [phone]   [email or web address]    I spent a total of 30 minutes face-to-face with Jenny Santiago during today s office visit. Over 50% of this time was spent counseling the patient and/or coordinating care regarding prenatal diagnosis and treatment options.

## 2019-04-02 ENCOUNTER — AMBULATORY - HEALTHEAST (OUTPATIENT)
Dept: MATERNAL FETAL MEDICINE | Facility: HOSPITAL | Age: 29
End: 2019-04-02

## 2019-04-02 ENCOUNTER — TELEPHONE (OUTPATIENT)
Dept: MATERNAL FETAL MEDICINE | Facility: CLINIC | Age: 29
End: 2019-04-02

## 2019-04-02 DIAGNOSIS — O26.90 PREGNANCY, ANTEPARTUM, COMPLICATIONS: ICD-10-CM

## 2019-04-04 NOTE — PROGRESS NOTES
"Please see \"Imaging\" tab under \"Chart Review\" for details of today's US.    Evy Cuello, DO    "

## 2019-04-05 ENCOUNTER — RECORDS - HEALTHEAST (OUTPATIENT)
Dept: ULTRASOUND IMAGING | Facility: HOSPITAL | Age: 29
End: 2019-04-05

## 2019-04-05 ENCOUNTER — OFFICE VISIT - HEALTHEAST (OUTPATIENT)
Dept: MATERNAL FETAL MEDICINE | Facility: HOSPITAL | Age: 29
End: 2019-04-05

## 2019-04-05 ENCOUNTER — PRENATAL OFFICE VISIT - HEALTHEAST (OUTPATIENT)
Dept: FAMILY MEDICINE | Facility: CLINIC | Age: 29
End: 2019-04-05

## 2019-04-05 ENCOUNTER — RECORDS - HEALTHEAST (OUTPATIENT)
Dept: ADMINISTRATIVE | Facility: OTHER | Age: 29
End: 2019-04-05

## 2019-04-05 DIAGNOSIS — O26.90 PREGNANCY RELATED CONDITIONS, UNSPECIFIED, UNSPECIFIED TRIMESTER: ICD-10-CM

## 2019-04-05 DIAGNOSIS — O35.10X0 SUSPECTED CHROMOSOME ANOMALY OF FETUS AFFECTING MANAGEMENT OF MOTHER, ANTEPARTUM, SINGLE OR UNSPECIFIED FETUS: ICD-10-CM

## 2019-04-05 DIAGNOSIS — O40.3XX0 POLYHYDRAMNIOS IN THIRD TRIMESTER COMPLICATION, SINGLE OR UNSPECIFIED FETUS: ICD-10-CM

## 2019-04-05 DIAGNOSIS — O35.9XX0 FETAL ABNORMALITY AFFECTING MANAGEMENT OF MOTHER, SINGLE OR UNSPECIFIED FETUS: ICD-10-CM

## 2019-04-05 DIAGNOSIS — O36.63X0 MACROSOMIA OF FETUS AFFECTING MANAGEMENT OF MOTHER IN THIRD TRIMESTER, SINGLE OR UNSPECIFIED FETUS: ICD-10-CM

## 2019-04-05 DIAGNOSIS — O09.93 SUPERVISION OF HIGH RISK PREGNANCY IN THIRD TRIMESTER: ICD-10-CM

## 2019-04-05 NOTE — PROGRESS NOTES
Visit Date:   2019      I had the opportunity to meet with Ms. Jenny Santiago at the request of Dr. Shyann Garcia from the Maternal Fetal Medicine Service at the [facility].  We met for Neonatology consultation in conjunction with Jesica Pulido from maternal child health social work.  Ms. Santiago is currently 33 and 4 weeks pregnant with a fetus affected by macroglossia, polyhydramnios, and large for gestational age size.  Of note, mother herself has a history of omphalocele in childhood.  This constellation of findings brought to question the diagnosis of Kenyatta-Wiedemann syndrome.  She has met with my colleagues in Pediatric Genetics in order to further discuss testing, both for her and future testing for her infant after arrival.     We had the opportunity to review the basic considerations for an infant born with his constellation of symptoms with the potential diagnosis of Kenyatta-Wiedemann syndrome in the .  We discussed the increased risk of hypoglycemia and the potential need for Pediatric Endocrinology involvement.  We did discuss the small potential for airway concerns with macroglossia and Ms. Santiago will be meeting with my colleagues in Pediatric ENT prior to delivery.  We also discussed the need for ongoing Genetics evaluation as well as further organ involvement in the future.  Of note, Ms. Santiago herself, has not been undergoing regular abdominal ultrasounds or any tumor marker screening.  We did discuss the need for this in her infant in the future if diagnosis is confirmed.      We had the opportunity to review the basic rounding structure and teams in the  Intensive Care Unit and invited the family to be present on rounds daily if they desire.  We also discussed the additional layers with support including lactation, occupational therapy, and social work support.  A tour was provided of the NICU and my contact information was provided.  If  any further questions should arise, they can feel free to contact me at any time.      We look forward to caring for the infant of Ms. Yesi Santiago in the  Intensive Care Unit at the [facility].      Total time of visit:  30 minutes with 100% of time spent in direct patient counseling.         Sincerely,      SARAH HOLLINGSWORTH MD             D: 2019   T: 2019   MT: HUGO      Name:     YESI SANTIAGO   MRN:      [id number]        Account:      [id number]   :      1990           Visit Date:   2019      Document: [id number]       cc: Jessica Amin MD

## 2019-04-09 ENCOUNTER — HOSPITAL ENCOUNTER (OUTPATIENT)
Facility: CLINIC | Age: 29
Setting detail: OBSERVATION
Discharge: HOME | End: 2019-04-11
Attending: OBSTETRICS & GYNECOLOGY | Admitting: OBSTETRICS & GYNECOLOGY
Payer: COMMERCIAL

## 2019-04-09 PROBLEM — O47.00 PRETERM CONTRACTIONS: Status: ACTIVE | Noted: 2019-04-09

## 2019-04-09 PROBLEM — O60.03 PRETERM LABOR IN THIRD TRIMESTER: Status: ACTIVE | Noted: 2019-04-09

## 2019-04-09 LAB
ABO + RH BLD: NORMAL
ABO + RH BLD: NORMAL
ALBUMIN UR-MCNC: NEGATIVE MG/DL
AMPHETAMINES UR QL SCN: NEGATIVE
APPEARANCE UR: CLEAR
BACTERIA #/AREA URNS HPF: ABNORMAL /HPF
BILIRUB UR QL STRIP: NEGATIVE
BLD GP AB SCN SERPL QL: NORMAL
BLOOD BANK CMNT PATIENT-IMP: NORMAL
CANNABINOIDS UR QL: NEGATIVE
COCAINE UR QL: NEGATIVE
COLOR UR AUTO: ABNORMAL
ERYTHROCYTE [DISTWIDTH] IN BLOOD BY AUTOMATED COUNT: 13.7 % (ref 10–15)
GLUCOSE UR STRIP-MCNC: NEGATIVE MG/DL
HCT VFR BLD AUTO: 33.8 % (ref 35–47)
HGB BLD-MCNC: 10.8 G/DL (ref 11.7–15.7)
HGB UR QL STRIP: NEGATIVE
KETONES UR STRIP-MCNC: 10 MG/DL
LEUKOCYTE ESTERASE UR QL STRIP: ABNORMAL
MCH RBC QN AUTO: 27.8 PG (ref 26.5–33)
MCHC RBC AUTO-ENTMCNC: 32 G/DL (ref 31.5–36.5)
MCV RBC AUTO: 87 FL (ref 78–100)
MUCOUS THREADS #/AREA URNS LPF: PRESENT /LPF
NITRATE UR QL: NEGATIVE
OPIATES UR QL SCN: NEGATIVE
PCP UR QL SCN: NEGATIVE
PH UR STRIP: 6.5 PH (ref 5–7)
PLATELET # BLD AUTO: 218 10E9/L (ref 150–450)
RBC # BLD AUTO: 3.89 10E12/L (ref 3.8–5.2)
RBC #/AREA URNS AUTO: 1 /HPF (ref 0–2)
SOURCE: ABNORMAL
SP GR UR STRIP: 1.01 (ref 1–1.03)
SPECIMEN EXP DATE BLD: NORMAL
SPECIMEN SOURCE: NORMAL
SQUAMOUS #/AREA URNS AUTO: 2 /HPF (ref 0–1)
TRANS CELLS #/AREA URNS HPF: <1 /HPF (ref 0–1)
UROBILINOGEN UR STRIP-MCNC: NORMAL MG/DL (ref 0–2)
WBC # BLD AUTO: 11.6 10E9/L (ref 4–11)
WBC #/AREA URNS AUTO: 5 /HPF (ref 0–5)
WET PREP SPEC: NORMAL

## 2019-04-09 PROCEDURE — 86850 RBC ANTIBODY SCREEN: CPT | Performed by: OBSTETRICS & GYNECOLOGY

## 2019-04-09 PROCEDURE — 87653 STREP B DNA AMP PROBE: CPT | Performed by: STUDENT IN AN ORGANIZED HEALTH CARE EDUCATION/TRAINING PROGRAM

## 2019-04-09 PROCEDURE — 80307 DRUG TEST PRSMV CHEM ANLYZR: CPT | Performed by: STUDENT IN AN ORGANIZED HEALTH CARE EDUCATION/TRAINING PROGRAM

## 2019-04-09 PROCEDURE — 59025 FETAL NON-STRESS TEST: CPT

## 2019-04-09 PROCEDURE — 87491 CHLMYD TRACH DNA AMP PROBE: CPT | Performed by: STUDENT IN AN ORGANIZED HEALTH CARE EDUCATION/TRAINING PROGRAM

## 2019-04-09 PROCEDURE — 85027 COMPLETE CBC AUTOMATED: CPT | Performed by: OBSTETRICS & GYNECOLOGY

## 2019-04-09 PROCEDURE — 86901 BLOOD TYPING SEROLOGIC RH(D): CPT | Performed by: OBSTETRICS & GYNECOLOGY

## 2019-04-09 PROCEDURE — 86900 BLOOD TYPING SEROLOGIC ABO: CPT | Performed by: OBSTETRICS & GYNECOLOGY

## 2019-04-09 PROCEDURE — 96372 THER/PROPH/DIAG INJ SC/IM: CPT

## 2019-04-09 PROCEDURE — 25000128 H RX IP 250 OP 636: Performed by: OBSTETRICS & GYNECOLOGY

## 2019-04-09 PROCEDURE — 87210 SMEAR WET MOUNT SALINE/INK: CPT | Performed by: STUDENT IN AN ORGANIZED HEALTH CARE EDUCATION/TRAINING PROGRAM

## 2019-04-09 PROCEDURE — G0463 HOSPITAL OUTPT CLINIC VISIT: HCPCS | Mod: 25

## 2019-04-09 PROCEDURE — 81001 URINALYSIS AUTO W/SCOPE: CPT | Performed by: STUDENT IN AN ORGANIZED HEALTH CARE EDUCATION/TRAINING PROGRAM

## 2019-04-09 PROCEDURE — 25800030 ZZH RX IP 258 OP 636: Performed by: STUDENT IN AN ORGANIZED HEALTH CARE EDUCATION/TRAINING PROGRAM

## 2019-04-09 PROCEDURE — 96360 HYDRATION IV INFUSION INIT: CPT

## 2019-04-09 PROCEDURE — 36415 COLL VENOUS BLD VENIPUNCTURE: CPT | Performed by: OBSTETRICS & GYNECOLOGY

## 2019-04-09 PROCEDURE — 87591 N.GONORRHOEAE DNA AMP PROB: CPT | Performed by: STUDENT IN AN ORGANIZED HEALTH CARE EDUCATION/TRAINING PROGRAM

## 2019-04-09 PROCEDURE — G0378 HOSPITAL OBSERVATION PER HR: HCPCS

## 2019-04-09 RX ORDER — LIDOCAINE 40 MG/G
CREAM TOPICAL
Status: DISCONTINUED | OUTPATIENT
Start: 2019-04-09 | End: 2019-04-11 | Stop reason: HOSPADM

## 2019-04-09 RX ORDER — ACETAMINOPHEN 325 MG/1
650 TABLET ORAL EVERY 4 HOURS PRN
Status: DISCONTINUED | OUTPATIENT
Start: 2019-04-09 | End: 2019-04-11 | Stop reason: HOSPADM

## 2019-04-09 RX ORDER — ALUMINA, MAGNESIA, AND SIMETHICONE 2400; 2400; 240 MG/30ML; MG/30ML; MG/30ML
30 SUSPENSION ORAL
Status: DISCONTINUED | OUTPATIENT
Start: 2019-04-09 | End: 2019-04-11 | Stop reason: HOSPADM

## 2019-04-09 RX ORDER — ONDANSETRON 2 MG/ML
4 INJECTION INTRAMUSCULAR; INTRAVENOUS EVERY 6 HOURS PRN
Status: DISCONTINUED | OUTPATIENT
Start: 2019-04-09 | End: 2019-04-09

## 2019-04-09 RX ORDER — ONDANSETRON 2 MG/ML
4 INJECTION INTRAMUSCULAR; INTRAVENOUS EVERY 6 HOURS PRN
Status: DISCONTINUED | OUTPATIENT
Start: 2019-04-09 | End: 2019-04-11 | Stop reason: HOSPADM

## 2019-04-09 RX ORDER — PRENATAL VIT/IRON FUM/FOLIC AC 27MG-0.8MG
1 TABLET ORAL DAILY
Status: DISCONTINUED | OUTPATIENT
Start: 2019-04-09 | End: 2019-04-11 | Stop reason: HOSPADM

## 2019-04-09 RX ORDER — BETAMETHASONE SODIUM PHOSPHATE AND BETAMETHASONE ACETATE 3; 3 MG/ML; MG/ML
12 INJECTION, SUSPENSION INTRA-ARTICULAR; INTRALESIONAL; INTRAMUSCULAR; SOFT TISSUE EVERY 24 HOURS
Status: COMPLETED | OUTPATIENT
Start: 2019-04-09 | End: 2019-04-10

## 2019-04-09 RX ADMIN — BETAMETHASONE SODIUM PHOSPHATE AND BETAMETHASONE ACETATE 12 MG: 3; 3 INJECTION, SUSPENSION INTRA-ARTICULAR; INTRALESIONAL; INTRAMUSCULAR at 17:55

## 2019-04-09 RX ADMIN — SODIUM CHLORIDE, POTASSIUM CHLORIDE, SODIUM LACTATE AND CALCIUM CHLORIDE 1000 ML: 600; 310; 30; 20 INJECTION, SOLUTION INTRAVENOUS at 16:19

## 2019-04-09 NOTE — PROGRESS NOTES
[facility] Labor and Delivery History and Physical    Jenny Santiago MRN# [id number]   Age: 28 year old YOB: 1990     Date of Admission: 2019  Primary care provider: Jessica Amin         CC:    Regular contractions at 34w5d         HPI:     Ms. Jenny Santiago is a 28 year old  at 34w5d by LMP c/w 8w4d US, who presents with contractions since 11:30 this morning at work. She states that her contractions have been occurring consistently every 3-4 minutes since they started this morning. She rates her contraction pain as 5/10, but at least one has taken her breath away. She does not report leaking fluid, vaginal bleeding. She reports normal, regular fetal movement.  She denies discharge or dysuria. She reports bilateral edema that is consistent with the past few weeks of her pregnancy. She denies pain except low pelvic pain associated with pregnancy. Her prior  delivery was at 36 weeks via .    Obstetric Complications  1. Suspected fetal Kenyatta-Wiedeman syndrome with macroglossia, polyhydramnios. Patient has personal history of omphalocele at birth and has seen Genetics and is undergoing genetic testing for Kenyatta-Wiedemann syndrome. Prenatal consultations with Pediatric ENT and Neonatology.  2. Polyhydramnios (ANKUR 2019 = 24.0)  3. Maternal history  delivery (36w0d)         Pregnancy history:     OBSTETRIC HISTORY:    OB History    Para Term  AB Living   2 1 0 1 0 1   SAB TAB Ectopic Multiple Live Births   0 0 0 0 1      # Outcome Date GA Lbr Jonah/2nd Weight Sex Delivery Anes PTL Lv   2 Current            1  2012 36w0d       ELI     Prenatal Labs:  ABO: O  Rh: pos  Antibody screen: negative  Hep B: negative 2018  Chl:  negative 2018  GC: negative 2018  Trep: negative 2019  Rubella: positive  HGB: 11.5 2019  HIV: negative 2018    Labs on admission:  GBS: pending  GC: pending  Chlam:  pending  Wet prep: no trich, no yeast, no clue cells, moderate PMNs  UA: + ketones, trace LE, few bacteria and squamous cells    Active Problem List  Patient Active Problem List   Diagnosis     Macrosomia of fetus affecting management of mother in third trimester, single or unspecified fetus     Polyhydramnios in third trimester complication, single or unspecified fetus     History of omphalocele     Kenyatta-Wiedemann syndrome      contractions      labor in third trimester     Medication Prior to Admission  Medications Prior to Admission   Medication Sig Dispense Refill Last Dose     Cholecalciferol (VITAMIN D PO) 5000 units daily.   2019 at Unknown time     Prenatal Vit-Fe Fumarate-FA (PRENATAL MULTIVITAMIN W/IRON) 27-0.8 MG tablet Take 1 tablet by mouth daily   2019 at Unknown time         Maternal Past Medical History:   Congenital omphalocele s/p repair                    Family History:   No family history on file.         Social History:     Social History     Socioeconomic History     Marital status:      Spouse name: Not on file     Number of children: Not on file     Years of education: Not on file     Highest education level: Not on file   Occupational History     Not on file   Social Needs     Financial resource strain: Not on file     Food insecurity:     Worry: Not on file     Inability: Not on file     Transportation needs:     Medical: Not on file     Non-medical: Not on file   Tobacco Use     Smoking status: Never Smoker     Smokeless tobacco: Never Used   Substance and Sexual Activity     Alcohol use: Not on file     Drug use: Not on file     Sexual activity: Not on file   Lifestyle     Physical activity:     Days per week: Not on file     Minutes per session: Not on file     Stress: Not on file   Relationships     Social connections:     Talks on phone: Not on file     Gets together: Not on file     Attends Denominational service: Not on file     Active member of club or  organization: Not on file     Attends meetings of clubs or organizations: Not on file     Relationship status: Not on file     Intimate partner violence:     Fear of current or ex partner: Not on file     Emotionally abused: Not on file     Physically abused: Not on file     Forced sexual activity: Not on file   Other Topics Concern     Parent/sibling w/ CABG, MI or angioplasty before 65F 55M? Not Asked   Social History Narrative     Not on file            Review of Systems:   Negative except as noted above in the HPI         Physical Exam:     O:  Patient Vitals for the past 24 hrs:   BP Resp   19 1430 132/77 18     Gen: Well-appearing, NAD, only occasionally feeling contraction pains but not consistently  CV: Regular rate, regular rhythm, faint systolic flow murmur  Pulm: CTAB, no wheezing  Abd: Soft, gravid, slightly tender to palpation  Ext: 2+ LE edema b/l    SSE: Cervix visually closed, copious amounts of milky white discharge  SVE: 1/40/-3, posterior    FHT: , moderate grupo, + accels, - decels  Branford: 3-4 ctx in 10 mins    Imaging:       Fetal Anatomy Survey   GA: 19w1d Single IUP, Fetal Anatomy WNL, 3 VC, Placenta: anterior, breech presentation,    2019 ([facility])   GA: 34w1d Single IUP, BPP 8/8, macroglossia but no other fetal anomaly, moderate polyhydramnios (ANKUR 24.0), cephalic presentation, placenta anterior           Assessment:   Ms. Jenny Santiago is a 28 year old  at 34w5d by LMP c/w 8w4d US with a history of prior  labor at 36 weeks, here with regular contractions for evaluation of  labor. Pregnancy complicated by suspected maternal and fetal Kenyatta-Wiedemann syndrome, polyhydramnios, fetal macroglossia and history of  birth.        Plan:     1. Regular  contractions, rule out  labor  Patient's cervix is 1 cm dilated on admission. Wet prep and UA not suggestive of infection. S/p 1L bolus. Cephalic presentation and anterior placenta  by  US.   - admit to labor and delivery for observation overnight  - administer BMZ -4/10 given high risk for late  birth  - collected GBS, GC  - monitor for continuation of contractions and cervical change  - Tocolysis not indicated given GA >33   - Antibiotics for GBS prophylaxis if cervical change. Will need to clarify allergic reaction to PCN. If low-risk would use Ancef for GBS prophylaxis. If high risk would need to treat with Vancomycin until GBS culture and sensitivities return.    2. Fetal wellbeing  - Category 1 reactive FHT, reassuring    3. Fetal macroglossia and maternal history of omphalocele, suspected Kenyatta-Wiedemann syndrome  Maternal history of congenital omphalocele s/p repair. Patient has been tested for BWS, pending results in 6 weeks per genetics. Fetus has been assessed by MFM, ENT, and genetics for evaluation of macroglossia associated with possible diagnosis of fetal Kenyatta-Wiedemann syndrome. Risks to baby include risks of macroglossia and hypoglycemia. Genetics and ENT anticipate only a small risk of respiratory compromise after delivery 2/2 macroglossia. If baby has BWS, will be at risk for  hypoglycemia.  - monitor baby closely at birth for several hours for hypoglycemia, consider IV glucose for baby if needed (see 3/29 Genetics note)    4. Polyhydramnios  Polyhydramnios noted on US at 25 weeks on 2019 with an ANKUR of 24.3, increased to 25.9 on 3/1, and further to max of 31.3 on 3/15. Began care here at [facility] in March. US on 3/26/2019 ANKUR = 23.9; US on 2019 ANKUR = 24.5; US on 2019 ANKUR = 24.0.    As the medical student, I acted as a scribe for this note. All portions of the documented history and physical were personally performed by Dr. Cintron as the attending physician.   Helga Toussaint, MS4  2019, 3:18 PM    This note, as scribed, accurately reflects the examination, my impressions and plan as discussed with the patient.    Marilu Cintron,  MD  Specialist in Maternal-Fetal Medicine

## 2019-04-10 LAB
GP B STREP DNA SPEC QL NAA+PROBE: NEGATIVE
N GONORRHOEA DNA SPEC QL NAA+PROBE: NEGATIVE
SPECIMEN SOURCE: NORMAL
SPECIMEN SOURCE: NORMAL

## 2019-04-10 PROCEDURE — 25000132 ZZH RX MED GY IP 250 OP 250 PS 637: Performed by: OBSTETRICS & GYNECOLOGY

## 2019-04-10 PROCEDURE — G0378 HOSPITAL OBSERVATION PER HR: HCPCS

## 2019-04-10 PROCEDURE — 59025 FETAL NON-STRESS TEST: CPT

## 2019-04-10 PROCEDURE — 96372 THER/PROPH/DIAG INJ SC/IM: CPT

## 2019-04-10 PROCEDURE — 59025 FETAL NON-STRESS TEST: CPT | Mod: 76

## 2019-04-10 PROCEDURE — 25000128 H RX IP 250 OP 636: Performed by: OBSTETRICS & GYNECOLOGY

## 2019-04-10 RX ORDER — LIDOCAINE 40 MG/G
CREAM TOPICAL
Status: DISCONTINUED | OUTPATIENT
Start: 2019-04-10 | End: 2019-04-11 | Stop reason: HOSPADM

## 2019-04-10 RX ADMIN — BETAMETHASONE SODIUM PHOSPHATE AND BETAMETHASONE ACETATE 12 MG: 3; 3 INJECTION, SUSPENSION INTRA-ARTICULAR; INTRALESIONAL; INTRAMUSCULAR at 17:57

## 2019-04-10 RX ADMIN — PRENATAL VIT W/ FE FUMARATE-FA TAB 27-0.8 MG 1 TABLET: 27-0.8 TAB at 07:38

## 2019-04-10 NOTE — PROVIDER NOTIFICATION
04/10/19 0102   Provider Notification   [facility]   Method of Notification Phone   Request Evaluate - Remote   Notification Reason Uterine Activity   Increased contractions every 3-4 minutes. Patient reports increased strength in contractions. Plan to continue to monitor, will notify provider if strength continues to increase.

## 2019-04-10 NOTE — PROGRESS NOTES
[facility]  OB Antepartum Progress Note        S: Still feeling contractions, now having more pressure with them. Small amount of spotting. No leaking. Baby moving well. No other complaints.      O:  Patient Vitals for the past 24 hrs:   BP Temp Temp src Pulse Resp   04/10/19 0724 114/66 98.1  F (36.7  C) Oral 80 18   04/10/19 0305 117/65 98.1  F (36.7  C) Oral -- 16   19 2335 110/55 97.5  F (36.4  C) Oral -- 16   19 2200 128/65 98  F (36.7  C) Oral -- 19 2150 128/65 -- -- -- --   19 2000 123/72 98.2  F (36.8  C) Oral -- 16   19 1735 117/66 98  F (36.7  C) Oral -- 18   19 1430 132/77 -- -- -- 18     Gen: Well-appearing, NAD   Abd: Soft, gravid, nontender  Ext: 1+ LE edema b/l    Cervix: 1.5/40/-3, posterior    FHT: , mod grupo, + accels, no decels  [place]:  Q3-6 minutes     Labs:  No new    A/P:  Ms. Jenny Santiago is a 28 year old  at 34w6d by  LMP c/w 8w4d US admitted to observation given concern for  labor.  Pregnancy complicated by suspected maternal and fetal Kenyatta-Wiedemann syndrome, polyhydramnios, fetal macroglossia and history of  birth.    #  contractions: She is not currently making cervical change, but she is at very high risk given her history of  delivery and polyhydramnios. She presented similarly with her previous  delivery and is worried about discharging while still having contractions. We are worried about this as well, especially because she should deliver in the hospital as the  will be at risk for respiratory compromise (2/2 macroglossia) and hypoglycemia (if BWS).  Wet prep neg, UA with trace LE, few bacteria and squamous cells. GC/CT pending  - continue monitoring ctx (can change to TID monitoring for now)  - repeat SVE PRN  - start GBS prophylaxis if cervical change given  status and GBS unknown status. Also order T&S/CBC  - second dose of betamethasone this  evening    # Fetal wellbeing  - Category 1 reactive FHT, reassuring  - US : single IUP, BPP , macroglossia but no other fetal anomaly, moderate polyhydramnios (ANKUR 24.0), cephalic presentation, placenta anterior    # PNC:   - rubella immune   - Rh pos   - GBS pending     Saloni Osborn, PGY-3  4/10/2019, 8:52 AM    Physician Attestation   I, Marilu Cintron, saw this patient with the resident and agree with the resident/fellow's findings and plan of care as documented in the note.      I personally reviewed vital signs, medications, labs and imaging.    Key findings: Continued contractions, but no cervical change. Is at high risk for  birth given the previous  birth, polyhydramnios in the current pregnancy, and regular contractions. Given fetal concerns for BW and macroglossia, delivery at a tertiary care facility. The current weather conditions would not allow her to safely return to the hospital in a timely fashion (she lives 30 minutes away without traffic). Recommend continued inpatient observation until tomorrow. Repeat BPP tomorrow prior to discharge if undelivered.    Marilu Cintron MD  Date of Service (when I saw the patient): 04/10/19

## 2019-04-10 NOTE — DISCHARGE SUMMARY
[facility] Discharge Summary    Jenny Santiago MRN# [id number]   Age: 28 year old YOB: 1990     Date of Admission:  2019  Date of Discharge:  19   Admitting Physician:  Marilu Cintron MD  Discharge Physician:  Same      Admission Diagnosis:  - IUP at 34w5d  -  contractions  - Hx  delivery at 36w  - Suspected fetal Kenyatta-Wiedemann syndrome, fetal macroglossia  - Poly hydramnios, ANKUR 24  - Maternal history omphalocele, repaired    Discharge Diagnosis:  Same     Procedures:  None     Consultations:  none    Medications prior to admission:  Medications Prior to Admission   Medication Sig Dispense Refill Last Dose     Cholecalciferol (VITAMIN D PO) 5000 units daily.   2019 at Unknown time     Prenatal Vit-Fe Fumarate-FA (PRENATAL MULTIVITAMIN W/IRON) 27-0.8 MG tablet Take 1 tablet by mouth daily   2019 at Unknown time         Brief History of Presentation:    Ms. Jenny Santiago is a 28 year old  at 34w5d by LMP c/w 8w4d US, who presents with contractions since 11:30 this morning at work. She states that her contractions have been occurring consistently every 3-4 minutes since they started this morning. She rates her contraction pain as 5/10, but at least one has taken her breath away. She does not report leaking fluid, vaginal bleeding. She reports normal, regular fetal movement.  She denies discharge or dysuria. She reports bilateral edema that is consistent with the past few weeks of her pregnancy. She denies pain except low pelvic pain associated with pregnancy. Her prior  delivery was at 36 weeks via .      Hospital Course:    Patient was admitted for observation for evaluation of  labor. On admission, her cervix was 1cm/40/-3 and on recheck 1 hour later had made change to 1-2cm/40/-3. She received IV fluid bolus. She received course of BMZ on -4/10. Work-up for  labor was negative. GBS  collected. She was monitored overnight after her second dose of betamethasone given the importance of her baby being delivered in a hospital in the event of delivery. Her cervix remained unchanged. Overnight, her contractions spaced out and she was deemed stable for discharge on HD#3.       Discharge Instructions:  Call or present to labor and delivery if you experience:   -Regular painful contractions concerning for labor   -Leakage of fluid concerning for ruptured membranes   -Decreased fetal movement   -Bright red vaginal bleeding    -Headache, vision changes, upper abdominal pain, significant increase in swelling,   generalized unwell feeling    Follow up:  Follow up in MFM clinic on 4/22/19. Follow up GC/Chlam results.      Discharge Medications:     Review of your medicines      CONTINUE these medicines which have NOT CHANGED      Dose / Directions   prenatal multivitamin w/iron 27-0.8 MG tablet      Dose:  1 tablet  Take 1 tablet by mouth daily  Refills:  0     VITAMIN D PO      5000 units daily.  Refills:  0          Saloni Osborn MD   PGY-3 Ob/Gyn    Marilu Cintron MD  Specialist in Maternal-Fetal Medicine

## 2019-04-10 NOTE — PROGRESS NOTES
[facility]  MATERNAL CHILD HEALTH   SOCIAL WORK PROGRESS NOTE      DATA:   Met with patient to assess needs and to offer support.      Mom is Jenny Santiago.  She is known to me from NICU consultation in the [facility].  Marylin is 28 years-old.  She and her  Justice are  and live in [place], [place].  They know they are expecting a baby girl.  They have chosen a name but await to share this until after baby's birth.  This baby girl is their 2nd child.  Their son,  Damián is 6 years-old.  Family and friends are supportive but many are busy with work and caring for their families.  Marylin is hoping her sister in law will be able to care for Damián when she is here for labor and delivery.      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  She hopes to take a 12 week maternity leave.  Justice works as Behavioral Specialist for [employer].  During the school year, he also works weekends in the deli at [employer].  During the summer, he works full-time at [employer].    Marylin has [employer] health insurance through her employer.  Baby will be added to this policy upon birth.  Marylin will bring baby to see Dr. Richey at the [facility] for primary care.  She has all essential baby supplies to bring her  home.     Marylin denies any mental health history or concerns.      INTERVENTION:   Brief psychosocial assessment completed.   Provided supportive counseling related to Marylin's pregnancy complications and the baby's potential NICU admission.   Provided education about pregnancy and postpartum mood and anxiety disorders.  Shared information about hospital parking and encouraged Marylin to let me know if cost of parking is a hardship.  If so, will access patient aid fund to assist with a parking pass.     ASSESSMENT:   Marylin verbalizes she is holding up ok.  She shares information openly but is guarded in sharing her  emotional experience.  She smiles and laughs but holds back her tears that are close to the surface.  No specific unmet needs or concerns are identified.     PLAN:   ADITYA will continue to follow along throughout Marylin's pregnancy journey for needs and for support.

## 2019-04-11 ENCOUNTER — RECORDS - HEALTHEAST (OUTPATIENT)
Dept: ADMINISTRATIVE | Facility: OTHER | Age: 29
End: 2019-04-11

## 2019-04-11 ENCOUNTER — HOSPITAL ENCOUNTER (OUTPATIENT)
Dept: ULTRASOUND IMAGING | Facility: CLINIC | Age: 29
Setting detail: OBSERVATION
End: 2019-04-11
Attending: OBSTETRICS & GYNECOLOGY
Payer: COMMERCIAL

## 2019-04-11 VITALS
RESPIRATION RATE: 18 BRPM | HEART RATE: 100 BPM | DIASTOLIC BLOOD PRESSURE: 67 MMHG | SYSTOLIC BLOOD PRESSURE: 131 MMHG | TEMPERATURE: 97.8 F

## 2019-04-11 DIAGNOSIS — O60.03 PRETERM LABOR IN THIRD TRIMESTER WITHOUT DELIVERY: Primary | ICD-10-CM

## 2019-04-11 LAB
C TRACH DNA SPEC QL NAA+PROBE: POSITIVE
SPECIMEN SOURCE: ABNORMAL

## 2019-04-11 PROCEDURE — G0378 HOSPITAL OBSERVATION PER HR: HCPCS

## 2019-04-11 PROCEDURE — 59025 FETAL NON-STRESS TEST: CPT | Mod: 76,59

## 2019-04-11 PROCEDURE — 76819 FETAL BIOPHYS PROFIL W/O NST: CPT

## 2019-04-11 PROCEDURE — 25000132 ZZH RX MED GY IP 250 OP 250 PS 637: Performed by: OBSTETRICS & GYNECOLOGY

## 2019-04-11 RX ORDER — AZITHROMYCIN 250 MG/1
1000 TABLET, FILM COATED ORAL DAILY
Qty: 4 TABLET | Refills: 0 | Status: ON HOLD | OUTPATIENT
Start: 2019-04-11 | End: 2019-04-14

## 2019-04-11 RX ADMIN — PRENATAL VIT W/ FE FUMARATE-FA TAB 27-0.8 MG 1 TABLET: 27-0.8 TAB at 08:01

## 2019-04-11 NOTE — PLAN OF CARE
"Maternal VSS, FHR 140s, +accels. Contractions q 1.5-4; pt states ctxns feel \"about the same.\" Rec'd beta #1 at 1755. Saline locked following fluid bolus. Pt eating and drinking normally. Complains of cramping, denies pelvic pressure. Unsure of desire for pain medication should labor progress. States she did not have a good experience with epidural at previous hospital. Pain options discussed. Plan for continuous monitoring, may come off monitor if <8 contractions in an hour. Report given to Erin MCCLENDON RN and care transferred.  "
Data: Patient presented to [facility] at 1406.   Reason for maternal/fetal assessment per patient is  Labor  .  Patient is a . Prenatal record reviewed.      OB History    Para Term  AB Living   2 1 0 1 0 1   SAB TAB Ectopic Multiple Live Births   0 0 0 0 1      # Outcome Date GA Lbr Jonah/2nd Weight Sex Delivery Anes PTL Lv   2 Current            1   36w0d       ELI   . Medical history: No past medical history on file.. Gestational Age 34w5d. VSS. Fetal movement present. Patient denies , backache, vaginal discharge, pelvic pressure, UTI symptoms, GI problems, bloody show, edema, headache, visual disturbances, epigastric or URQ pain, abdominal pain, rupture of membranes. Complains of contractions q 2-3 minutes. Support persons not present.  Action: Verbal consent for EFM. Triage assessment completed. EFM applied. Uterine assessment: contractions q 2-3 minutes. Fetal assessment: Presumed adequate fetal oxygenation documented (see flow record).   Response: Bill Osborn  informed of patient arrival. Plan per provider is labs and monitoring. Patient verbalized agreement with plan.  
Marylin continues to contract frequently, every 2-6 minutes. Palpate mild. She is able to talk through them and is relaxed, states they are the same as before. Baby with moderate variability and accels, difficult to monitor at times.  and son are with her for the night.  
Pt discharged at 1123 after being evaluated for pre-term labor. Precautions reviewed. Per Pt and toco uterine contractions have overall stopped- only occasional contraction present. Fetus active. Pt is in agreement with plan.  
Pt reports back ache, hot packs given. Also reports some vaginal pressure. Feels contractions tho not any stronger than NOC. No LOF or bleeding. Active baby. Eating breakfast, awaiting plan of MD to determine if she'll stay or be discharged. Cont cares.  
Transferred patient to [facility]  
VSS, afebrile.  Patient here to rule out  labor. C/O low back pain and contractions.  Patient is using hot packs for pain. Have encouraged ambulation to minimize lying in bed. Patient declines to walk outside of room and prefers to ambulate in room. Patient yessy ~5 x per hour with irritability, contractions not increasing in intensity over time. Denies loss of fluid, denies vaginal bleeding. FHT normal baseline, moderate variability, accelerations present, no decelerations.  Second betamethasone administered at 1800. Transferred to antepartum unit for overnight stay.  BPP in the morning and discharge to home expected. Will continue to monitor.   
VSS. Patient denies any bleeding, leaking of fluid. Patient states one short period of feeling contractions when waking up in the morning, has resolved. EFM charted. Patient slept okay overnight.  
VSS. Patient resting most of the night. Patient reports intermittent increases in intensity of contractions, reports not as strong as when she arrived at the hospital. Feeling most of the contractions in her lower back. Sometimes decreases after voiding. Reports a little of dark red blood on toilet paper after urinating. Denies leaking of fluid. FHR with moderate variability and accels.    
Simple: Patient demonstrates quick and easy understanding/Verbalized Understanding

## 2019-04-11 NOTE — PROVIDER NOTIFICATION
04/11/19 0755   Provider Notification   Provider Name/Title Dr Cintron   Method of Notification In Department   BPP 6/8- plan to do NST.

## 2019-04-11 NOTE — PROGRESS NOTES
[facility]  OB Antepartum Progress Note        S:  Only a few contractions this morning, stopped when put on the monitor. No spotting. No leaking. Baby moving well. No other complaints.      O:  Patient Vitals for the past 24 hrs:   BP Temp Temp src Pulse Resp   19 0008 102/54 98.1  F (36.7  C) Oral -- 16   04/10/19 2012 125/74 98.6  F (37  C) Oral -- 16   04/10/19 1534 116/67 98.3  F (36.8  C) Oral -- 16   04/10/19 1321 117/74 98.4  F (36.9  C) Oral 100 --   04/10/19 0724 114/66 98.1  F (36.7  C) Oral 80 18     Gen: Well-appearing, NAD   Abd: Soft, gravid, nontender  Ext: 1+ LE edema b/l    Cervix: 1.5/40/-3, posterior     FHT: , mod grupo, + accels, no decels  [place]:  quiet    Labs:  No new    A/P:  Ms. Jenny Santiago is a 28 year old  at 35w0d by  LMP c/w 8w4d US admitted to observation given concern for  labor.  Pregnancy complicated by suspected maternal and fetal Kenyatta-Wiedemann syndrome, polyhydramnios, fetal macroglossia and history of  delivery.      #  contractions:  No cervical change and contractions improved. Wet prep neg, UA with trace LE, few bacteria and squamous cells. GC/CT pending    # Fetal wellbeing  - reactive FHT, reassuring  - US : single IUP, BPP 8/10, macroglossia but no other fetal anomaly, ANKUR 20, cephalic presentation, placenta anterior    # PNC:   - rubella immune   - Rh pos   - GBS pending     Dispo: home today. Reviewed return precautions. Now that poly is resolved, okay to space BPPs. Will get growth/BPP on .     Saloni Osborn, PGY-3  4/10/2019, 8:52 AM    Physician Attestation   I, Marilu Cintron, saw and evaluated this patient prior to discharge.  I discussed the patient with the resident/fellow and agree with plan of care as documented in the note.      I personally reviewed vital signs, medications, labs and imaging.    I personally spent 20 minutes on discharge activities.    Marilu Guardado  MD Abdulaziz  Date of Service (when I saw the patient): 04/11/19

## 2019-04-11 NOTE — DISCHARGE INSTRUCTIONS
Discharge Instruction for Undelivered Patients      You were seen for: Labor Assessment  We Consulted: Dr Cintron  You had (Test or Medicine):infection screening, fetal and uterine monitoring,  labor assessments     Diet:   Drink 8 to 12 glasses of liquids (milk, juice, water) every day.  You may eat meals and snacks.     Activity:  Rest the pelvic area. No sex. Do not stimulate breasts or nipples.  Count fetal kicks everyday (see handout)  Call your doctor or nurse midwife if your baby is moving less than usual.     Call your provider if you notice:  Swelling in your face or increased swelling in your hands or legs.  Headaches that are not relieved by Tylenol (acetaminophen).  Changes in your vision (blurring: seeing spots or stars.)  Nausea (sick to your stomach) and vomiting (throwing up).   Weight gain of 5 pounds or more per week.  Heartburn that doesn't go away.  Signs of bladder infection: pain when you urinate (use the toilet), need to go more often and more urgently.  The bag of medina (rupture of membranes) breaks, or you notice leaking in your underwear.  Bright red blood in your underwear.  Abdominal (lower belly) or stomach pain.  Second (plus) baby: Contractions (tightening) less than 10 minutes apart and getting stronger.  Increase or change in vaginal discharge (note the color and amount)      Follow-up:  As scheduled in the clinic

## 2019-04-11 NOTE — PROVIDER NOTIFICATION
04/11/19 0840   Provider Notification   Provider Name/Title Dr Cintron, Dr Chandler   Method of Notification At Bedside   Notification Reason Other (Comment)   Providers at bedside for morning rounds. BPP 6/8- 2 points off for breathing however, reactive NST. Pt only having occasional contractions- 1 contraction/hour. Plan to discharge home. Precautions of when to seek medical attention reviewed. Pt in agreement with plan.

## 2019-04-13 ENCOUNTER — HOSPITAL ENCOUNTER (OUTPATIENT)
Facility: CLINIC | Age: 29
Discharge: HOME | End: 2019-04-14
Attending: OBSTETRICS & GYNECOLOGY | Admitting: OBSTETRICS & GYNECOLOGY
Payer: COMMERCIAL

## 2019-04-13 DIAGNOSIS — B96.89 BV (BACTERIAL VAGINOSIS): Primary | ICD-10-CM

## 2019-04-13 DIAGNOSIS — N76.0 BV (BACTERIAL VAGINOSIS): Primary | ICD-10-CM

## 2019-04-13 PROBLEM — Z36.89 ENCOUNTER FOR TRIAGE IN PREGNANT PATIENT: Status: ACTIVE | Noted: 2019-04-13

## 2019-04-13 RX ORDER — ONDANSETRON 2 MG/ML
4 INJECTION INTRAMUSCULAR; INTRAVENOUS EVERY 6 HOURS PRN
Status: DISCONTINUED | OUTPATIENT
Start: 2019-04-13 | End: 2019-04-14 | Stop reason: HOSPADM

## 2019-04-13 RX ORDER — ACETAMINOPHEN 325 MG/1
650 TABLET ORAL EVERY 4 HOURS PRN
Status: DISCONTINUED | OUTPATIENT
Start: 2019-04-13 | End: 2019-04-14 | Stop reason: HOSPADM

## 2019-04-14 VITALS — RESPIRATION RATE: 18 BRPM | DIASTOLIC BLOOD PRESSURE: 73 MMHG | TEMPERATURE: 98.2 F | SYSTOLIC BLOOD PRESSURE: 123 MMHG

## 2019-04-14 LAB
ALBUMIN UR-MCNC: NEGATIVE MG/DL
APPEARANCE UR: CLEAR
BILIRUB UR QL STRIP: NEGATIVE
COLOR UR AUTO: NORMAL
GLUCOSE UR STRIP-MCNC: NEGATIVE MG/DL
HGB UR QL STRIP: NEGATIVE
KETONES UR STRIP-MCNC: NEGATIVE MG/DL
LEUKOCYTE ESTERASE UR QL STRIP: NEGATIVE
NITRATE UR QL: NEGATIVE
PH UR STRIP: 5.5 PH (ref 5–7)
RUPTURE OF FETAL MEMBRANES BY ROM PLUS: NEGATIVE
SOURCE: NORMAL
SP GR UR STRIP: 1.01 (ref 1–1.03)
SPECIMEN SOURCE: ABNORMAL
UROBILINOGEN UR STRIP-MCNC: NORMAL MG/DL (ref 0–2)
WET PREP SPEC: ABNORMAL

## 2019-04-14 PROCEDURE — 59025 FETAL NON-STRESS TEST: CPT

## 2019-04-14 PROCEDURE — G0463 HOSPITAL OUTPT CLINIC VISIT: HCPCS | Mod: 25

## 2019-04-14 PROCEDURE — 84112 EVAL AMNIOTIC FLUID PROTEIN: CPT | Performed by: STUDENT IN AN ORGANIZED HEALTH CARE EDUCATION/TRAINING PROGRAM

## 2019-04-14 PROCEDURE — 81003 URINALYSIS AUTO W/O SCOPE: CPT | Performed by: STUDENT IN AN ORGANIZED HEALTH CARE EDUCATION/TRAINING PROGRAM

## 2019-04-14 PROCEDURE — 87210 SMEAR WET MOUNT SALINE/INK: CPT | Performed by: STUDENT IN AN ORGANIZED HEALTH CARE EDUCATION/TRAINING PROGRAM

## 2019-04-14 PROCEDURE — 87086 URINE CULTURE/COLONY COUNT: CPT | Performed by: STUDENT IN AN ORGANIZED HEALTH CARE EDUCATION/TRAINING PROGRAM

## 2019-04-14 RX ORDER — METRONIDAZOLE 500 MG/1
500 TABLET ORAL 2 TIMES DAILY
Qty: 14 TABLET | Refills: 0 | Status: ON HOLD | OUTPATIENT
Start: 2019-04-14 | End: 2019-04-29

## 2019-04-14 NOTE — PROGRESS NOTES
Obstetrics Triage Note    HPI:  Jenny Santiago is a 28 year old  at 35w3d, pregnancy complicated by suspected maternal and fetal Kenyatta-Wiedemann syndrome, polyhydramnios, fetal macroglossia and history of  birth, presenting to triage with a chief complaint of leaking fluid since .      Patient states that she noticed leaking of watery fluid.  She has contractions that are unchanged.  Normal fetal movement.  She states that she has otherwise been feeling well. She denies fever, N/V, abdominal pain, vaginal bleeding, vaginal discharge, dysuria.  Notably she was recently admitted and discharged () for threatened  labor.  At that time she was treated for chlamydia.    ROS:  Negative except as mentioned in HPI.    PMH:  Diagnosis Date     Likely Kenyatta-Wiedemann syndrome      PSH:  Procedure Laterality Date     AS REMOVAL OF TONSILS,<11 Y/O       AS REPAIR LARGE OMPHALOCELE       Medications:  Medication     acetaminophen (TYLENOL) tablet 650 mg     ondansetron (ZOFRAN) injection 4 mg     Allergies:  Allergen Reactions     Penicillins Rash     Family history-Son and sister.       Physical Exam:   Vitals:    19 2345   BP: 123/73   Resp: 18   Temp: 98.2  F (36.8  C)   TempSrc: Oral      Gen:  resting comfortably, in NAD  Abd:  soft, gravid, non-tender, non-distended  SSE: copious thick yellow/green discharge, no leakage of watery fluid per os  SVE: 1.5/40/-3 to -4    ANKUR by bedside US 19.3    NST:  FHT: , moderate grupo, + accels, no decels  [place]: Q 3-12 min    Labs:  Results for orders placed or performed during the hospital encounter of 19 (from the past 24 hour(s))   Rupture of Fetal Membranes by ROM Plus   Result Value Ref Range    Rupture of Fetal Membranes by ROM Plus Negative NEG^Negative     Assessment/Plan: Jenny Santiago is a 28 year old female  at 35w3d, presenting with complaints of leakage of fluids, no suspicion for ruptured membranes.  - leakage  of fluids: ROMPlus negative and exam showed thick discharge, potentially related to recent chlamydia infection.  ANKUR stable at 19.3.  Wet prep shows clue cells, will treat for BV with 7 day course of Flagyl 500mg PO BID.  -  contractions: stable per patient, cervix unchanged  - Fetus: reactive NST  - Dispo: to home with follow up next week as planed. Discussed that she is high risk for  labor and PPROM. Patient is aware of this due to previous conversations as well.  Pt counseled to call or present to L&D with concerns for labor, bleeding, ruptured membranes.    Discussed with Dr. Mar.    Anila Alexander MD  OBGYN PGY-3  1:05 AM 2019    MFM Attending Addendum    I discussued the care of Ms. Santiago with the resident providing the services during the visit.      Patient followed by MFM for:  Patient Active Problem List   Diagnosis     Macrosomia of fetus affecting management of mother in third trimester, single or unspecified fetus     Polyhydramnios in third trimester complication, single or unspecified fetus     History of omphalocele     Kenyatta-Wiedemann syndrome     /73   Temp 98.2  F (36.8  C) (Oral)   Resp 18   LMP 2018     I was directly responsible for the patient's management.  The services provided were appropriate.  I was available to the patient had the need arisen.  Please see note for further details.        Erin Mar MD  , OB/GYN  Maternal-Fetal Medicine  [email or web address]  [phone] (Academic office)  [phone] (Pager)

## 2019-04-14 NOTE — DISCHARGE INSTRUCTIONS
Discharge Instruction for Undelivered Patients      You were seen for: Membrane Assessment  We Consulted: Dr Alexander, Dr Mar  You had (Test or Medicine):UA/ Wet Prep/ ROM +    Diet:   Drink 8 to 12 glasses of liquids (milk, juice, water) every day.  You may eat meals and snacks.     Activity:  Call your doctor or nurse midwife if your baby is moving less than usual.     Call your provider if you notice:  Swelling in your face or increased swelling in your hands or legs.  Headaches that are not relieved by Tylenol (acetaminophen).  Changes in your vision (blurring: seeing spots or stars.)  Nausea (sick to your stomach) and vomiting (throwing up).   Weight gain of 5 pounds or more per week.  Heartburn that doesn't go away.  Signs of bladder infection: pain when you urinate (use the toilet), need to go more often and more urgently.  The bag of medina (rupture of membranes) breaks, or you notice leaking in your underwear.  Bright red blood in your underwear.  Abdominal (lower belly) or stomach pain.  For first baby: Contractions (tightening) less than 5 minutes apart for one hour or more.  Second (plus) baby: Contractions (tightening) less than 10 minutes apart and getting stronger.  *If less than 34 weeks: Contractions (tightenings) more than 6 times in one hour.  Increase or change in vaginal discharge (note the color and amount)      Follow-up:  As scheduled in the clinic

## 2019-04-14 NOTE — PLAN OF CARE
Data: [facility] at 2329.   Reason for maternal/fetal assessment per patient is Spontaneous Rupture of Membrane (leaking since  )  . Patient is a . Prenatal record reviewed.      OB History    Para Term  AB Living   2 1 0 1 0 1   SAB TAB Ectopic Multiple Live Births   0 0 0 0 1      # Outcome Date GA Lbr Jonah/2nd Weight Sex Delivery Anes PTL Lv   2 Current            1  2012 36w0d       ELI      Medical History: History reviewed. No pertinent past medical history.. Gestational Age 35w3d. VSS. Cervix: dilated to 1.5/30/-3.  Fetal movement present. Patient denies backache, UTI symptoms, GI problems, bloody show, vaginal bleeding, edema, headache, visual disturbances, epigastric or URQ pain, abdominal pain. Support persons  present.  Action: Verbal consent for EFM. Triage assessment completed.  Fetal assessment: Presumed adequate fetal oxygenation documented (see flow record). Patient education pamphlets given on fetal movement counts. Patient instructed to report change in fetal movement, vaginal leaking of fluid or bleeding, abdominal pain, or any concerns related to the pregnancy to her nurse/physician.   Response: Dr. Alexander informed of arrival. Plan per provider is discharge with flagyl. Patient verbalized understanding of education and verbalized agreement with plan. Discharged ambulatory at 0225.

## 2019-04-15 LAB
BACTERIA SPEC CULT: NORMAL
Lab: NORMAL
SPECIMEN SOURCE: NORMAL

## 2019-04-17 ENCOUNTER — TELEPHONE (OUTPATIENT)
Dept: CONSULT | Facility: CLINIC | Age: 29
End: 2019-04-17

## 2019-04-17 NOTE — TELEPHONE ENCOUNTER
Notified Jenny that no prior authorization is required for genetic testing. Explained there's no option to guarantee coverage of testing upfront by insurance.    Provided eJnny with estimated out of pocket cost if testing were to be covered. However, Jenny was aware that insurance may not cover the cost of testing and would be responsible for the billed amount.     Jenny expressed understanding and stated that she wants to discuss with her spouse before proceeding with testing. Jenny planned to call me back with her decision.     Wanda Serna    Division of Genetics  [facility]  P: [phone]

## 2019-04-19 ENCOUNTER — PRENATAL OFFICE VISIT - HEALTHEAST (OUTPATIENT)
Dept: FAMILY MEDICINE | Facility: CLINIC | Age: 29
End: 2019-04-19

## 2019-04-19 DIAGNOSIS — O35.9XX0 FETAL ABNORMALITY AFFECTING MANAGEMENT OF MOTHER, SINGLE OR UNSPECIFIED FETUS: ICD-10-CM

## 2019-04-19 DIAGNOSIS — O40.3XX0 POLYHYDRAMNIOS IN THIRD TRIMESTER COMPLICATION, SINGLE OR UNSPECIFIED FETUS: ICD-10-CM

## 2019-04-19 DIAGNOSIS — O09.93 SUPERVISION OF HIGH RISK PREGNANCY IN THIRD TRIMESTER: ICD-10-CM

## 2019-04-19 LAB — HGB BLD-MCNC: 11 G/DL (ref 12–16)

## 2019-04-20 LAB
ALLERGIC TO PENICILLIN: YES
GP B STREP DNA SPEC QL NAA+PROBE: NEGATIVE

## 2019-04-22 ENCOUNTER — RECORDS - HEALTHEAST (OUTPATIENT)
Dept: ULTRASOUND IMAGING | Facility: HOSPITAL | Age: 29
End: 2019-04-22

## 2019-04-22 ENCOUNTER — TELEPHONE (OUTPATIENT)
Dept: MATERNAL FETAL MEDICINE | Facility: CLINIC | Age: 29
End: 2019-04-22

## 2019-04-22 ENCOUNTER — RECORDS - HEALTHEAST (OUTPATIENT)
Dept: ADMINISTRATIVE | Facility: OTHER | Age: 29
End: 2019-04-22

## 2019-04-22 ENCOUNTER — OFFICE VISIT - HEALTHEAST (OUTPATIENT)
Dept: MATERNAL FETAL MEDICINE | Facility: HOSPITAL | Age: 29
End: 2019-04-22

## 2019-04-22 DIAGNOSIS — O35.10X0 SUSPECTED CHROMOSOME ANOMALY OF FETUS AFFECTING MANAGEMENT OF MOTHER, ANTEPARTUM, SINGLE OR UNSPECIFIED FETUS: ICD-10-CM

## 2019-04-22 DIAGNOSIS — O26.90 PREGNANCY RELATED CONDITIONS, UNSPECIFIED, UNSPECIFIED TRIMESTER: ICD-10-CM

## 2019-04-22 NOTE — TELEPHONE ENCOUNTER
Writer called Jenny to verify date requested for IOL. Jenny requested 5/10/19. Writer called L&D, IOL scheduled for 5/10/19 at 0830. Jenny notified and email sent to Jenny with L&D phone number to call 1 hour before scheduled. Jenny verbalized understanding.  Janett Christiansen RN

## 2019-04-22 NOTE — TELEPHONE ENCOUNTER
Jenny called writer back requesting to change date of IOL to Saturday 5/11. Writer called L&D to change to 5/11 at 0830. Jenny aware of change.  Janett Christiansen RN

## 2019-04-23 ENCOUNTER — TELEPHONE (OUTPATIENT)
Dept: MATERNAL FETAL MEDICINE | Facility: CLINIC | Age: 29
End: 2019-04-23

## 2019-04-23 ENCOUNTER — HOSPITAL ENCOUNTER (OUTPATIENT)
Facility: CLINIC | Age: 29
Discharge: HOME | End: 2019-04-23
Attending: OBSTETRICS & GYNECOLOGY | Admitting: OBSTETRICS & GYNECOLOGY
Payer: COMMERCIAL

## 2019-04-23 VITALS
TEMPERATURE: 98.4 F | BODY MASS INDEX: 34.84 KG/M2 | HEART RATE: 96 BPM | HEIGHT: 63 IN | DIASTOLIC BLOOD PRESSURE: 82 MMHG | SYSTOLIC BLOOD PRESSURE: 131 MMHG | RESPIRATION RATE: 16 BRPM

## 2019-04-23 LAB
ALBUMIN UR-MCNC: NEGATIVE MG/DL
APPEARANCE UR: CLEAR
BILIRUB UR QL STRIP: NEGATIVE
COLOR UR AUTO: YELLOW
GLUCOSE UR STRIP-MCNC: NEGATIVE MG/DL
HGB UR QL STRIP: NEGATIVE
KETONES UR STRIP-MCNC: 40 MG/DL
LEUKOCYTE ESTERASE UR QL STRIP: ABNORMAL
MUCOUS THREADS #/AREA URNS LPF: PRESENT /LPF
NITRATE UR QL: NEGATIVE
PH UR STRIP: 6 PH (ref 5–7)
RBC #/AREA URNS AUTO: 2 /HPF (ref 0–2)
SOURCE: ABNORMAL
SP GR UR STRIP: 1.01 (ref 1–1.03)
SQUAMOUS #/AREA URNS AUTO: 2 /HPF (ref 0–1)
UROBILINOGEN UR STRIP-MCNC: NORMAL MG/DL (ref 0–2)
WBC #/AREA URNS AUTO: 4 /HPF (ref 0–5)

## 2019-04-23 PROCEDURE — 59025 FETAL NON-STRESS TEST: CPT

## 2019-04-23 PROCEDURE — 81001 URINALYSIS AUTO W/SCOPE: CPT | Performed by: STUDENT IN AN ORGANIZED HEALTH CARE EDUCATION/TRAINING PROGRAM

## 2019-04-23 PROCEDURE — G0463 HOSPITAL OUTPT CLINIC VISIT: HCPCS | Mod: 25

## 2019-04-23 ASSESSMENT — ACTIVITIES OF DAILY LIVING (ADL)
BATHING: 0-->INDEPENDENT
RETIRED_EATING: 0-->INDEPENDENT
AMBULATION: 0-->INDEPENDENT
SWALLOWING: 0-->SWALLOWS FOODS/LIQUIDS WITHOUT DIFFICULTY
FALL_HISTORY_WITHIN_LAST_SIX_MONTHS: NO
DRESS: 0-->INDEPENDENT
RETIRED_COMMUNICATION: 0-->UNDERSTANDS/COMMUNICATES WITHOUT DIFFICULTY
COGNITION: 0 - NO COGNITION ISSUES REPORTED
TRANSFERRING: 0-->INDEPENDENT
TOILETING: 0-->INDEPENDENT

## 2019-04-23 NOTE — TELEPHONE ENCOUNTER
Jenny called [facility] today and reports ctx every 2-3 min since 1 am. She states that her ctx are mild, she can walk and talk through them and are NOT increasing in intensity. Jenny declines LOF and vaginal bleeding. Pt reports +FM. Jenny is at work and is unsure if she should be evaluated in L&D. Writer encouraged pt to go to L&D at any time to be evaluated and reviewed danger signs. Discussed with Jenny to make sure she is drinking fluids, keep track of the baby movement and if ctx intensity increases or there are any other changes, she should go to L&D. Jenny verbalized understanding.  Janett Christiansen RN

## 2019-04-23 NOTE — PLAN OF CARE
Data: [facility].   Reason for maternal/fetal assessment per patient is Contractions  . Patient is a . Prenatal record reviewed.      OB History    Para Term  AB Living   2 1 0 1 0 1   SAB TAB Ectopic Multiple Live Births   0 0 0 0 1      # Outcome Date GA Lbr Jonah/2nd Weight Sex Delivery Anes PTL Lv   2 Current            1   36w0d       ELI      Medical History:   Past Medical History:   Diagnosis Date     Kenyatta-Wiedemann syndrome    . Gestational Age 36w5d. VSS. Cervix: dilated to 1.  Fetal movement present. Patient denies backache, vaginal discharge, pelvic pressure, UTI symptoms, GI problems, bloody show, vaginal bleeding, edema, headache, visual disturbances, epigastric or URQ pain, abdominal pain, rupture of membranes. Support persons not present.  Action: Verbal consent for EFM. Triage assessment completed. EFM applied for 75 minutes. Uterine assessment shows contractions every 2-5 minutes. Fetal assessment: Presumed adequate fetal oxygenation documented (see flow record). Patient education pamphlets given on fetal movement counts. Patient instructed to report change in fetal movement, vaginal leaking of fluid or bleeding, abdominal pain, or any concerns related to the pregnancy to her nurse/physician.   Response: Dr. Abbott informed of SVE by Dr. Joseph. Plan per provider is discharge to home due to no cervical change since last Friday. Patient verbalized understanding of education and verbalized agreement with plan.  Patient left prior to signing discharge instructions.  Received instructions from Dr Joseph and then left unit.  Discharged ambulatory at 1730.

## 2019-04-23 NOTE — PROGRESS NOTES
"[facility]  OB Triage Note    CC: Rule out  labor    HPI: Ms. Jenny Santiago is a 28 year old  at 36w5d by LMP c/w 8w4d US, who presents with contractions for a rule out of pre-term labor. She reports that her contractions started around 0100a last night. Since they started, she reports they have been 2-3 minutes apart. She went to work and was able to work through the contractions, only stopping briefly during contractions to wait 30 seconds for them to pass. They do not take her breath away. They have not changed in frequency, but within the past hour prior to admission, she started to notice they became slightly different. Though she could not characterize whether they were stronger. She came here for evaluation. She denies leaking fluid, vaginal bleeding. Normal, regular fetal movement.    She was evaluated on  for rule out  labor (contractions) and was treated for chlamydia at that time. She returned for rule out rupture on  and ROM+ was negative; she was treated for BV with a 7-day course of metronidazole at that time.    She has a scheduled IOL on 5/10/19 at 0830.    Obstetric Complications  1. Suspected fetal BWS  2. H/o polyhydramnios in this current pregnancy  3. H/o  delivery at 36w0d    O:  Patient Vitals for the past 24 hrs:   BP Temp Temp src Pulse Resp Height   19 1640 131/82 98.4  F (36.9  C) Oral 96 16 1.6 m (5' 3\")   19 1614 120/71 98.3  F (36.8  C) Oral -- 18 --     Gen: Well-appearing, NAD  CV: RRR, no murmurs  Pulm: CTAB, no wheezing  Abd: Soft, gravid, nontender to palpation  Ext: 1+ LE edema b/l, calves nontender    SVE: 1.5cm/40/-3 (unchanged from prior exams)    FHT: , moderate grupo, + accels, - decels  [place]: 2-3 ctx in 10 mins, irregular    Labs:  None indicated    A/P:  Ms. Jenny Santiago is a 28 year old  at LMP c/w 8w4d US here with rule out of  labor based on contractions since 0100 this " morning.    1. Rule out  labor  Patient is comfortable-appearing through contractions, does not report that they are increasing in frequency or intensity. Her cervix is unchanged since her last triage evaluation. She does not report dysuria, change in discharge, or other symptoms that would create suspicion for an infectious process, and she has been worked up for an infectious etiology the two prior times she was evaluated in triage.  - plan to discharge to home with instructions to follow up next week with MFM as planned  - discussed with her indications for re-evaluation  - UA sent. Will need chlamydia test of cure at next visit.     2. Fetal wellbeing  - Category 1 reactive FHT, reassuring    Helga Toussaint, MS4  2019, 5:07 PM    I was present with the medical student who participated in the service and in the documentation of the note. I have verified the history and personally performed the physical exam and medical decision making. I agree with the assessment and plan of care as documented in the note.    Jennifer Joseph MD, MHS  Ob/Gyn PGY3  19     I was immediately available and reviewed the patient course, fetal monitoring and evaluation and agree with plan to discharge home with follow up in clinic or back at the hospital if her clinical picture changes.

## 2019-04-23 NOTE — DISCHARGE INSTRUCTIONS
Discharge Instructions for  Undelivered Patients    You were seen for: Labor Assessment  We Consulted: Dr. Abbott  You had (Test or Medicine):NST     Diet:  * Drink 8 to 12 glasses of liquids (milk, juice, water) every day.  You may eat meals and snacks.    Activity:  * Call your doctor or nurse midwife if your baby is moving less than usual.    Call your provider if you notice:  * Swelling in your face or increased swelling in your hands or legs.  * Headaches that are not relieved by Tylenol (acetaminophen).  * Changes in your vision (blurring; seeing spots or stars).  * Nausea (sick to your stomach) and vomiting (throwing up).  * Weight gain of 5 pounds per week.  * Heartburn that doesn't go away.  * Signs of bladder infection: Pain when you urinate (use the toilet), needing to go more often or more urgently.  * The bag of medina (membrane) breaks, or you notice leaking in your underwear.  * Bright red blood in your underwear.  * Abdominal (lower belly) or stomach pain.  * Contractions (tightenings) more than 6 times in one hour.  * Increase or change in vaginal discharge (note the color and amount).  Keep your next scheduled appointment

## 2019-04-23 NOTE — PLAN OF CARE
Patient here for labor eval.  Patient c/o contractions worsening during work today.  Dr. Joseph in to see patient and do SVE.  Dr. Joseph will speak with Dr. Abbott to come up with patient plan of care.  Will await plan from MD.

## 2019-04-26 ENCOUNTER — COMMUNICATION - HEALTHEAST (OUTPATIENT)
Dept: FAMILY MEDICINE | Facility: CLINIC | Age: 29
End: 2019-04-26

## 2019-04-26 ENCOUNTER — HOSPITAL ENCOUNTER (OUTPATIENT)
Facility: CLINIC | Age: 29
Discharge: HOME | End: 2019-04-26
Attending: OBSTETRICS & GYNECOLOGY | Admitting: OBSTETRICS & GYNECOLOGY
Payer: COMMERCIAL

## 2019-04-26 VITALS
TEMPERATURE: 98.3 F | SYSTOLIC BLOOD PRESSURE: 117 MMHG | DIASTOLIC BLOOD PRESSURE: 68 MMHG | RESPIRATION RATE: 18 BRPM | HEART RATE: 89 BPM

## 2019-04-26 PROBLEM — O09.629: Status: ACTIVE | Noted: 2019-04-26

## 2019-04-26 LAB — RUPTURE OF FETAL MEMBRANES BY ROM PLUS: NEGATIVE

## 2019-04-26 PROCEDURE — 84112 EVAL AMNIOTIC FLUID PROTEIN: CPT | Performed by: OBSTETRICS & GYNECOLOGY

## 2019-04-26 PROCEDURE — G0463 HOSPITAL OUTPT CLINIC VISIT: HCPCS | Mod: 25

## 2019-04-26 PROCEDURE — 59025 FETAL NON-STRESS TEST: CPT

## 2019-04-26 RX ORDER — ONDANSETRON 2 MG/ML
4 INJECTION INTRAMUSCULAR; INTRAVENOUS EVERY 6 HOURS PRN
Status: DISCONTINUED | OUTPATIENT
Start: 2019-04-26 | End: 2019-04-26 | Stop reason: HOSPADM

## 2019-04-26 ASSESSMENT — ACTIVITIES OF DAILY LIVING (ADL)
BATHING: 0-->INDEPENDENT
FALL_HISTORY_WITHIN_LAST_SIX_MONTHS: NO
DRESS: 0-->INDEPENDENT
AMBULATION: 0-->INDEPENDENT
TRANSFERRING: 0-->INDEPENDENT
TOILETING: 0-->INDEPENDENT
RETIRED_COMMUNICATION: 0-->UNDERSTANDS/COMMUNICATES WITHOUT DIFFICULTY
SWALLOWING: 0-->SWALLOWS FOODS/LIQUIDS WITHOUT DIFFICULTY
RETIRED_EATING: 0-->INDEPENDENT
COGNITION: 0 - NO COGNITION ISSUES REPORTED

## 2019-04-26 NOTE — PROVIDER NOTIFICATION
04/26/19 1503   Provider Notification   Provider Name/Title Dr. Amor   Method of Notification At Bedside   Request Evaluate in Person   Notification Reason Labor Status   Provider at bedside to evaluate in person. Patient ROM plus negative (-) SVE 2cm. Plan per provider to offer pt option of staying for re-evaluation after 1-2hrs or discharge home at this time. Patient to discuss options with  and notify RN of decision. Will proceed with ongoing assessment.

## 2019-04-26 NOTE — DISCHARGE INSTRUCTIONS
Discharge Instruction for Undelivered Patients      You were seen for: Labor Assessment and Membrane Assessment  We Consulted: Dr. Amor  You had (Test or Medicine):NST, SVE     Diet:   Drink 8 to 12 glasses of liquids (milk, juice, water) every day.  You may eat meals and snacks.     Activity:  Count fetal kicks everyday (see handout)  Call your doctor or nurse midwife if your baby is moving less than usual.     Call your provider if you notice:  Swelling in your face or increased swelling in your hands or legs.  Headaches that are not relieved by Tylenol (acetaminophen).  Changes in your vision (blurring: seeing spots or stars.)  Nausea (sick to your stomach) and vomiting (throwing up).   Weight gain of 5 pounds or more per week.  Heartburn that doesn't go away.  Signs of bladder infection: pain when you urinate (use the toilet), need to go more often and more urgently.  The bag of medina (rupture of membranes) breaks, or you notice leaking in your underwear.  Bright red blood in your underwear.  Abdominal (lower belly) or stomach pain.  Second (plus) baby: Contractions (tightening) less than 10 minutes apart and getting stronger.  Increase or change in vaginal discharge (note the color and amount)    Follow-up:  As scheduled in the clinic

## 2019-04-29 ENCOUNTER — HOSPITAL ENCOUNTER (OUTPATIENT)
Facility: CLINIC | Age: 29
Discharge: HOME | End: 2019-04-29
Attending: OBSTETRICS & GYNECOLOGY | Admitting: OBSTETRICS & GYNECOLOGY
Payer: COMMERCIAL

## 2019-04-29 VITALS
TEMPERATURE: 98.4 F | HEART RATE: 87 BPM | RESPIRATION RATE: 18 BRPM | WEIGHT: 200 LBS | BODY MASS INDEX: 35.44 KG/M2 | SYSTOLIC BLOOD PRESSURE: 129 MMHG | DIASTOLIC BLOOD PRESSURE: 66 MMHG | HEIGHT: 63 IN

## 2019-04-29 LAB
RUPTURE OF FETAL MEMBRANES BY ROM PLUS: NEGATIVE
SPECIMEN SOURCE: NORMAL
WET PREP SPEC: NORMAL

## 2019-04-29 PROCEDURE — 87591 N.GONORRHOEAE DNA AMP PROB: CPT | Performed by: STUDENT IN AN ORGANIZED HEALTH CARE EDUCATION/TRAINING PROGRAM

## 2019-04-29 PROCEDURE — 84112 EVAL AMNIOTIC FLUID PROTEIN: CPT | Performed by: OBSTETRICS & GYNECOLOGY

## 2019-04-29 PROCEDURE — G0463 HOSPITAL OUTPT CLINIC VISIT: HCPCS | Mod: 25

## 2019-04-29 PROCEDURE — 87210 SMEAR WET MOUNT SALINE/INK: CPT | Performed by: STUDENT IN AN ORGANIZED HEALTH CARE EDUCATION/TRAINING PROGRAM

## 2019-04-29 PROCEDURE — 87491 CHLMYD TRACH DNA AMP PROBE: CPT | Performed by: STUDENT IN AN ORGANIZED HEALTH CARE EDUCATION/TRAINING PROGRAM

## 2019-04-29 PROCEDURE — 59025 FETAL NON-STRESS TEST: CPT

## 2019-04-29 ASSESSMENT — MIFFLIN-ST. JEOR: SCORE: 1601.32

## 2019-04-30 LAB
C TRACH DNA SPEC QL NAA+PROBE: NEGATIVE
N GONORRHOEA DNA SPEC QL NAA+PROBE: NEGATIVE
SPECIMEN SOURCE: NORMAL
SPECIMEN SOURCE: NORMAL

## 2019-04-30 NOTE — PROVIDER NOTIFICATION
04/29/19 2029   Provider Notification   Provider Name/Title Dr Alexander   Method of Notification At Bedside     ROM plus negative. Provider at bedside to perform spec exam. Labwork/swabs collected. SVE 1.5/70/-3. Plan per provider is to discharge pt to home. Can come off monitor. Pt would like to wait for results of wet prep prior to discharge.

## 2019-04-30 NOTE — PROGRESS NOTES
"Obstetrics Triage Note    HPI:  Jenny Santiago is a 28 year old  at 37w4d by LMP c/w 8w4d US, pregnant affected by suspected fetal Kenyatta-Wiedemann syndrome, presenting to triage with a chief complaint of leakage of fluids.      Patient states that she noted intermittent leakage of fluids yesterday and today.  Not any large amounts.  She does not think she is yessy.  She is having frequent fetal movement.  No vaginal bleeding or vaginal itching.  No abdominal pain.    Pregnancy is notable for:  Suspected fetal Kenyatta-Wiedemann syndrome  Resolved polyhydramnios   contractions  Chlamydia infection in this pregnancy  Recent bacterial vaginosis treatment    ROS:  Negative except as mentioned in HPI.    PMH:  Past Medical History:   Diagnosis Date     Kenyatta-Wiedemann syndrome      PSH:  Past Surgical History:   Procedure Laterality Date     AS REMOVAL OF TONSILS,<13 Y/O       AS REPAIR LARGE OMPHALOCELE         Medications:  No current facility-administered medications for this encounter.        Allergies:     Allergies   Allergen Reactions     Penicillins Rash     Family history-Son and sister.       Physical Exam:   Vitals:    19   BP: 129/66   Pulse: 87   Resp: 18   Temp: 98.4  F (36.9  C)   TempSrc: Oral   Weight: 90.7 kg (200 lb)   Height: 1.6 m (5' 3\")      Gen:  resting comfortably, in NAD  Abd:  soft, gravid, non-tender, non-distended  :   - SSE: copious thick white vaginal discharge, no pooling, no bleeding  - SVE: 1.5/70/-3  Ext: trace pedal edema    NST:  FHT: , moderate grupo, + accels, no decels  [place]: Q 8-10 min    Labs:   Results for orders placed or performed during the hospital encounter of 19 (from the past 24 hour(s))   Rupture of Fetal Membranes by ROM Plus   Result Value Ref Range    Rupture of Fetal Membranes by ROM Plus Negative NEG^Negative   Wet prep   Result Value Ref Range    Specimen Description Vagina     Wet Prep No motile Trichomonas seen "     Wet Prep No yeast seen     Wet Prep Moderate  PMNs seen       Wet Prep No clue cells seen      Assessment/Plan: Jenny Santiago is a 28 year old female  at 37w4d, presenting with complaints of leakage of fluids, negative ROM Plus, no pooling, cervical exam stable over several weeks.  - leakage of fluids: ROMPlus negative and exam showed thick discharge, potentially related to recent chlamydia, BV, and associated antibiotic use, may also be simply physiologic.  Wet prep negative.  Cervical exam stable. GC/CT neg.  - Fetus: reactive NST  - Dispo: to home with follow up as scheduled. Discussed tylenol for pain as needed. Discussed labor warning signs and indication to return to care.    Discussed with Dr. Garcia.    Anila Alexander MD  OBGYN PGY-3  8:46 PM 2019    Above patient discussed with me and I agree with the above documentation.    Shyann Garcia, DO  Maternal Fetal Medicine

## 2019-04-30 NOTE — PROGRESS NOTES
Data: [facility] 1944.   Reason for maternal/fetal assessment per patient is Rule out rupture of membranes  . Patient is a . Prenatal record reviewed.      OB History    Para Term  AB Living   2 1 0 1 0 1   SAB TAB Ectopic Multiple Live Births   0 0 0 0 1      # Outcome Date GA Lbr Jonah/2nd Weight Sex Delivery Anes PTL Lv   2 Current            1   36w0d       ELI      Medical History:   Past Medical History:   Diagnosis Date     Kenyatta-Wiedemann syndrome    . Gestational Age 37w4d. VSS. Cervix: dilated to 1.5/70/-3.  Fetal movement present. Patient denies cramping, backache, pelvic pressure, UTI symptoms, GI problems, bloody show, vaginal bleeding, edema, headache, visual disturbances, epigastric or URQ pain, abdominal pain. Pt unsure if ROM, c/o clear/white fluid over last two days.   Action: Verbal consent for EFM. Triage assessment completed. EFM applied for fetal well-being. Uterine assessment done, irregular contractions noted on monitor. Fetal assessment: Presumed adequate fetal oxygenation documented (see flow record). ROM plus negative. Patient education pamphlets given on fetal movement counts and when to call provider. Patient instructed to report change in fetal movement, vaginal leaking of fluid or bleeding, abdominal pain, or any concerns related to the pregnancy to her nurse/physician.   Response: Dr. Alexander informed of pt arrival and negative ROM plus. Plan per provider is await lab results of wet prep and discharge pt to home. Wet prep results WNL. Patient verbalized understanding of education and verbalized agreement with plan. Discharged ambulatory at 2105.

## 2019-04-30 NOTE — DISCHARGE INSTRUCTIONS
Discharge Instruction for Undelivered Patients      You were seen for: Membrane Assessment  We Consulted: Dr Garcia and Dr Alexander  You had (Test or Medicine): Fetal Monitoring and Labwork     Diet:   Drink 8 to 12 glasses of liquids (milk, juice, water) every day.     Activity:  Count fetal kicks everyday (see handout)  Call your doctor or nurse midwife if your baby is moving less than usual.     Call your provider if you notice:  Swelling in your face or increased swelling in your hands or legs.  Headaches that are not relieved by Tylenol (acetaminophen).  Changes in your vision (blurring: seeing spots or stars.)  Nausea (sick to your stomach) and vomiting (throwing up).   Weight gain of 5 pounds or more per week.  Heartburn that doesn't go away.  Signs of bladder infection: pain when you urinate (use the toilet), need to go more often and more urgently.  The bag of medina (rupture of membranes) breaks, or you notice leaking in your underwear.  Bright red blood in your underwear.  Abdominal (lower belly) or stomach pain.  Second (plus) baby: Contractions (tightening) less than 10 minutes apart and getting stronger.  Increase or change in vaginal discharge (note the color and amount)    Follow-up:  As scheduled in the clinic

## 2019-05-01 ENCOUNTER — RECORDS - HEALTHEAST (OUTPATIENT)
Dept: ADMINISTRATIVE | Facility: OTHER | Age: 29
End: 2019-05-01

## 2019-05-01 ENCOUNTER — RECORDS - HEALTHEAST (OUTPATIENT)
Dept: ULTRASOUND IMAGING | Facility: HOSPITAL | Age: 29
End: 2019-05-01

## 2019-05-01 ENCOUNTER — OFFICE VISIT - HEALTHEAST (OUTPATIENT)
Dept: MATERNAL FETAL MEDICINE | Facility: HOSPITAL | Age: 29
End: 2019-05-01

## 2019-05-01 DIAGNOSIS — O35.10X0 MATERNAL CARE FOR (SUSPECTED) CHROMOSOMAL ABNORMALITY IN FETUS, NOT APPLICABLE OR UNSPECIFIED: ICD-10-CM

## 2019-05-01 DIAGNOSIS — O35.10X0 SUSPECTED CHROMOSOME ANOMALY OF FETUS AFFECTING MANAGEMENT OF MOTHER, ANTEPARTUM, SINGLE OR UNSPECIFIED FETUS: ICD-10-CM

## 2019-05-02 ENCOUNTER — TELEPHONE (OUTPATIENT)
Dept: MATERNAL FETAL MEDICINE | Facility: CLINIC | Age: 29
End: 2019-05-02

## 2019-05-02 ENCOUNTER — HOSPITAL ENCOUNTER (OUTPATIENT)
Facility: CLINIC | Age: 29
Discharge: HOME | End: 2019-05-02
Attending: OBSTETRICS & GYNECOLOGY | Admitting: OBSTETRICS & GYNECOLOGY
Payer: COMMERCIAL

## 2019-05-02 VITALS
TEMPERATURE: 98 F | DIASTOLIC BLOOD PRESSURE: 70 MMHG | WEIGHT: 200 LBS | BODY MASS INDEX: 35.44 KG/M2 | HEIGHT: 63 IN | SYSTOLIC BLOOD PRESSURE: 129 MMHG

## 2019-05-02 LAB — RUPTURE OF FETAL MEMBRANES BY ROM PLUS: NEGATIVE

## 2019-05-02 PROCEDURE — 84112 EVAL AMNIOTIC FLUID PROTEIN: CPT | Performed by: OBSTETRICS & GYNECOLOGY

## 2019-05-02 PROCEDURE — G0463 HOSPITAL OUTPT CLINIC VISIT: HCPCS

## 2019-05-02 ASSESSMENT — MIFFLIN-ST. JEOR: SCORE: 1601.32

## 2019-05-02 NOTE — PLAN OF CARE
Cervix is unchanged at recheck. Plan is to discharge home and return if contractions get stronger and regular.

## 2019-05-02 NOTE — PROVIDER NOTIFICATION
19 1440   Provider Notification   [facility]   Method of Notification Electronic Page   Request Evaluate in Person   Notification Reason Patient Arrived      38.0 presented for rule out rupture. Rom+ is negative. Patient yessy q2-5 mins. Palpate moderately, patient states increased discomfort but still tolerable. FHR reactive.

## 2019-05-02 NOTE — PROGRESS NOTES
"Obstetrics Triage Note    HPI:  Jenny Santiago is a 28 year old  at 38w0d by LMP c/w 8w4d US, here with complaints of leaking of fluid.      Patient states that she was at work earlier today and sat down on the toilet and had leaking of a large amount of clear fluid. Reports it was not urine and was concerned her water had broken. Denies any fluid gushes since then.  Denies any popping sensation.  + FM.  She states that she has otherwise been feeling well. Reports contractions have been getting more uncomfortable and increasing in frequency maybe every 3 minutes.  She denies other symptoms.    Pregnancy is complicated by:  - history of omphalecele, possibe Kenyatta Wiedemann syndrome  - history of polyhydramnios, now resolved  -  contractions  - Chlamydia infection  - Bacterial vaginosis    ROS:  Negative except as mentioned in HPI.    PMH:  Past Medical History:   Diagnosis Date     Kenyatta-Wiedemann syndrome        PSHx:  Past Surgical History:   Procedure Laterality Date     AS REMOVAL OF TONSILS,<13 Y/O       AS REPAIR LARGE OMPHALOCELE         Medications:  PNV    Allergies:   Allergies   Allergen Reactions     Penicillins Rash     Family history-Son and sister.       Physical Exam:   Vitals:    19 1347   BP: 129/70   Temp: 98  F (36.7  C)   TempSrc: Oral   Weight: 90.7 kg (200 lb)   Height: 1.6 m (5' 3\")    HR 94    Gen: resting comfortably, in NAD  CV: Regular rate   Pulm: Unlabored breathing  Abd: soft, gravid, non-tender, non-distended    SVE: 2.5/70/-2 (1500, 1630)  Membranes: intact, amnisure negative    NST:  FHT: [place] 145, moderate variability, present accelerations, no decelerations  [place]: 3 contraction in ten minutes    Labs:  Results for orders placed or performed during the hospital encounter of 19   Rupture of Fetal Membranes by ROM Plus   Result Value Ref Range    Rupture of Fetal Membranes by ROM Plus Negative NEG^Negative     Assessment/Plan: Jenny Santiago is " a 28 year old  at 38w0d by LMP c/w 8w4d US, here for rule out rupture and rule out labor.  Stable, doing well.    Rule out rupture  -Amnisure negative    Rule out labor  - SVE 2.5/70/-2 over two checks about 1.5 hours apart  - discussed reasons to return to triage including increasingly painful contractions, vaginal bleeding, leaking fluid or decreased fetal movement.  - Dispo: to home with follow up in the next week. Discussed tylenol for pain as needed. Discussed labor warning signs and indication to return to care.    Amy Schumer, MD  Obstetrics and Gyncology, PGY-2  May 2, 2019 , 5:15 PM    The above note describes the conversation and management decisions I made with the resident.    Shyann Garcia, DO  Maternal Fetal Medicine

## 2019-05-02 NOTE — PROVIDER NOTIFICATION
05/02/19 1450   Provider Notification   Provider Name/Title Schumer   Method of Notification At Bedside   SVE 2-3/60/-2.  Plan to recheck in 1 hour.

## 2019-05-02 NOTE — DISCHARGE INSTRUCTIONS
Discharge Instruction for Undelivered Patients      You were seen for: Labor Assessment and Membrane Assessment  We Consulted: Dr Jose Medel  You had (Test or Medicine):ROM plus , Fetal monitoring    Diet:   Drink 8 to 12 glasses of liquids (milk, juice, water) every day.  You may eat meals and snacks.     Activity:  Call your doctor or nurse midwife if your baby is moving less than usual.     Call your provider if you notice:  Swelling in your face or increased swelling in your hands or legs.  Headaches that are not relieved by Tylenol (acetaminophen).  Changes in your vision (blurring: seeing spots or stars.)  Nausea (sick to your stomach) and vomiting (throwing up).   Weight gain of 5 pounds or more per week.  Heartburn that doesn't go away.  Signs of bladder infection: pain when you urinate (use the toilet), need to go more often and more urgently.  The bag of medina (rupture of membranes) breaks, or you notice leaking in your underwear.  Bright red blood in your underwear.  Abdominal (lower belly) or stomach pain.  For first baby: Contractions (tightening) less than 5 minutes apart for one hour or more.  Second (plus) baby: Contractions (tightening) less than 10 minutes apart and getting stronger.  *If less than 34 weeks: Contractions (tightenings) more than 6 times in one hour.  Increase or change in vaginal discharge (note the color and amount)      Follow-up:  As scheduled in the clinic

## 2019-05-02 NOTE — TELEPHONE ENCOUNTER
"Marylin called in and said \"I am leaking amniotic fluid\"  \"I am leaking after going to the bathroom and this isn't normal\". Patient states she has been in numerous times for leaking, but is coming in again to L&D to be assessed. Writer called L&D to give report of patient coming in.  Pregnancy is affected by suspected fetal BeckWiith Wiedemann syndrome  Erin Bell RN  "

## 2019-05-03 ENCOUNTER — HOSPITAL ENCOUNTER (INPATIENT)
Facility: CLINIC | Age: 29
LOS: 2 days | Discharge: HOME HEALTH | End: 2019-05-05
Attending: OBSTETRICS & GYNECOLOGY | Admitting: OBSTETRICS & GYNECOLOGY
Payer: COMMERCIAL

## 2019-05-03 ENCOUNTER — RECORDS - HEALTHEAST (OUTPATIENT)
Dept: ADMINISTRATIVE | Facility: OTHER | Age: 29
End: 2019-05-03

## 2019-05-03 ENCOUNTER — PRENATAL OFFICE VISIT - HEALTHEAST (OUTPATIENT)
Dept: FAMILY MEDICINE | Facility: CLINIC | Age: 29
End: 2019-05-03

## 2019-05-03 DIAGNOSIS — O36.63X0 MACROSOMIA OF FETUS AFFECTING MANAGEMENT OF MOTHER IN THIRD TRIMESTER, SINGLE OR UNSPECIFIED FETUS: ICD-10-CM

## 2019-05-03 DIAGNOSIS — O09.93 SUPERVISION OF HIGH RISK PREGNANCY IN THIRD TRIMESTER: ICD-10-CM

## 2019-05-03 DIAGNOSIS — O35.9XX0 FETAL ABNORMALITY AFFECTING MANAGEMENT OF MOTHER, SINGLE OR UNSPECIFIED FETUS: ICD-10-CM

## 2019-05-03 DIAGNOSIS — O40.3XX0 POLYHYDRAMNIOS IN THIRD TRIMESTER COMPLICATION, SINGLE OR UNSPECIFIED FETUS: ICD-10-CM

## 2019-05-03 LAB
ERYTHROCYTE [DISTWIDTH] IN BLOOD BY AUTOMATED COUNT: 14.7 % (ref 10–15)
HCT VFR BLD AUTO: 39.3 % (ref 35–47)
HGB BLD-MCNC: 12.4 G/DL (ref 11.7–15.7)
MCH RBC QN AUTO: 27.4 PG (ref 26.5–33)
MCHC RBC AUTO-ENTMCNC: 31.6 G/DL (ref 31.5–36.5)
MCV RBC AUTO: 87 FL (ref 78–100)
PLATELET # BLD AUTO: 249 10E9/L (ref 150–450)
RBC # BLD AUTO: 4.53 10E12/L (ref 3.8–5.2)
WBC # BLD AUTO: 10.3 10E9/L (ref 4–11)

## 2019-05-03 PROCEDURE — 86901 BLOOD TYPING SEROLOGIC RH(D): CPT | Performed by: OBSTETRICS & GYNECOLOGY

## 2019-05-03 PROCEDURE — 85027 COMPLETE CBC AUTOMATED: CPT | Performed by: OBSTETRICS & GYNECOLOGY

## 2019-05-03 PROCEDURE — 25800030 ZZH RX IP 258 OP 636

## 2019-05-03 PROCEDURE — 86850 RBC ANTIBODY SCREEN: CPT | Performed by: OBSTETRICS & GYNECOLOGY

## 2019-05-03 PROCEDURE — G0463 HOSPITAL OUTPT CLINIC VISIT: HCPCS

## 2019-05-03 PROCEDURE — 86900 BLOOD TYPING SEROLOGIC ABO: CPT | Performed by: OBSTETRICS & GYNECOLOGY

## 2019-05-03 PROCEDURE — 25000125 ZZHC RX 250

## 2019-05-03 PROCEDURE — 25000125 ZZHC RX 250: Performed by: OBSTETRICS & GYNECOLOGY

## 2019-05-03 PROCEDURE — 86780 TREPONEMA PALLIDUM: CPT | Performed by: OBSTETRICS & GYNECOLOGY

## 2019-05-03 PROCEDURE — 12000001 ZZH R&B MED SURG/OB UMMC

## 2019-05-03 RX ORDER — SODIUM CHLORIDE, SODIUM LACTATE, POTASSIUM CHLORIDE, CALCIUM CHLORIDE 600; 310; 30; 20 MG/100ML; MG/100ML; MG/100ML; MG/100ML
INJECTION, SOLUTION INTRAVENOUS
Status: COMPLETED
Start: 2019-05-03 | End: 2019-05-03

## 2019-05-03 RX ORDER — MISOPROSTOL 200 UG/1
TABLET ORAL
Status: COMPLETED
Start: 2019-05-03 | End: 2019-05-04

## 2019-05-03 RX ORDER — OXYTOCIN 10 [USP'U]/ML
INJECTION, SOLUTION INTRAMUSCULAR; INTRAVENOUS
Status: DISCONTINUED
Start: 2019-05-03 | End: 2019-05-04 | Stop reason: HOSPADM

## 2019-05-03 RX ORDER — OXYTOCIN/0.9 % SODIUM CHLORIDE 30/500 ML
PLASTIC BAG, INJECTION (ML) INTRAVENOUS
Status: COMPLETED
Start: 2019-05-03 | End: 2019-05-03

## 2019-05-03 RX ORDER — LIDOCAINE HYDROCHLORIDE 10 MG/ML
INJECTION, SOLUTION EPIDURAL; INFILTRATION; INTRACAUDAL; PERINEURAL
Status: COMPLETED
Start: 2019-05-03 | End: 2019-05-04

## 2019-05-03 RX ADMIN — Medication 30 UNITS: at 23:58

## 2019-05-03 RX ADMIN — OXYTOCIN-SODIUM CHLORIDE 0.9% IV SOLN 30 UNIT/500ML 30 UNITS: 30-0.9/5 SOLUTION at 23:58

## 2019-05-03 RX ADMIN — OXYTOCIN-SODIUM CHLORIDE 0.9% IV SOLN 30 UNIT/500ML 1 MILLI-UNITS/MIN: 30-0.9/5 SOLUTION at 23:40

## 2019-05-03 RX ADMIN — SODIUM CHLORIDE, POTASSIUM CHLORIDE, SODIUM LACTATE AND CALCIUM CHLORIDE 1000 ML: 600; 310; 30; 20 INJECTION, SOLUTION INTRAVENOUS at 23:00

## 2019-05-03 RX ADMIN — OXYTOCIN-SODIUM CHLORIDE 0.9% IV SOLN 30 UNIT/500ML 340 ML/HR: 30-0.9/5 SOLUTION at 23:55

## 2019-05-04 PROBLEM — R10.9 ABDOMINAL PAIN AFFECTING PREGNANCY: Status: ACTIVE | Noted: 2019-05-04

## 2019-05-04 PROBLEM — O26.899 ABDOMINAL PAIN AFFECTING PREGNANCY: Status: ACTIVE | Noted: 2019-05-04

## 2019-05-04 LAB
ABO + RH BLD: NORMAL
ABO + RH BLD: NORMAL
BLD GP AB SCN SERPL QL: NORMAL
BLOOD BANK CMNT PATIENT-IMP: NORMAL
SPECIMEN EXP DATE BLD: NORMAL
T PALLIDUM AB SER QL: NONREACTIVE

## 2019-05-04 PROCEDURE — 88307 TISSUE EXAM BY PATHOLOGIST: CPT | Mod: 26 | Performed by: OBSTETRICS & GYNECOLOGY

## 2019-05-04 PROCEDURE — G0463 HOSPITAL OUTPT CLINIC VISIT: HCPCS

## 2019-05-04 PROCEDURE — 25000132 ZZH RX MED GY IP 250 OP 250 PS 637

## 2019-05-04 PROCEDURE — 25000132 ZZH RX MED GY IP 250 OP 250 PS 637: Performed by: OBSTETRICS & GYNECOLOGY

## 2019-05-04 PROCEDURE — 25000125 ZZHC RX 250

## 2019-05-04 PROCEDURE — 0UQMXZZ REPAIR VULVA, EXTERNAL APPROACH: ICD-10-PCS | Performed by: OBSTETRICS & GYNECOLOGY

## 2019-05-04 PROCEDURE — 88307 TISSUE EXAM BY PATHOLOGIST: CPT | Performed by: OBSTETRICS & GYNECOLOGY

## 2019-05-04 PROCEDURE — 0HQ9XZZ REPAIR PERINEUM SKIN, EXTERNAL APPROACH: ICD-10-PCS | Performed by: OBSTETRICS & GYNECOLOGY

## 2019-05-04 PROCEDURE — 72200001 ZZH LABOR CARE VAGINAL DELIVERY SINGLE

## 2019-05-04 PROCEDURE — 12000001 ZZH R&B MED SURG/OB UMMC

## 2019-05-04 PROCEDURE — 25000132 ZZH RX MED GY IP 250 OP 250 PS 637: Performed by: INTERNAL MEDICINE

## 2019-05-04 PROCEDURE — 25000125 ZZHC RX 250: Performed by: OBSTETRICS & GYNECOLOGY

## 2019-05-04 RX ORDER — LANOLIN 100 %
OINTMENT (GRAM) TOPICAL
Status: DISCONTINUED | OUTPATIENT
Start: 2019-05-04 | End: 2019-05-05 | Stop reason: HOSPADM

## 2019-05-04 RX ORDER — ACETAMINOPHEN 325 MG/1
650 TABLET ORAL EVERY 4 HOURS PRN
Status: DISCONTINUED | OUTPATIENT
Start: 2019-05-04 | End: 2019-05-04

## 2019-05-04 RX ORDER — OXYTOCIN 10 [USP'U]/ML
10 INJECTION, SOLUTION INTRAMUSCULAR; INTRAVENOUS
Status: DISCONTINUED | OUTPATIENT
Start: 2019-05-04 | End: 2019-05-05 | Stop reason: HOSPADM

## 2019-05-04 RX ORDER — MISOPROSTOL 200 UG/1
400 TABLET ORAL
Status: DISCONTINUED | OUTPATIENT
Start: 2019-05-04 | End: 2019-05-05 | Stop reason: HOSPADM

## 2019-05-04 RX ORDER — OXYTOCIN/0.9 % SODIUM CHLORIDE 30/500 ML
100-340 PLASTIC BAG, INJECTION (ML) INTRAVENOUS CONTINUOUS PRN
Status: COMPLETED | OUTPATIENT
Start: 2019-05-04 | End: 2019-05-03

## 2019-05-04 RX ORDER — METHYLERGONOVINE MALEATE 0.2 MG/ML
200 INJECTION INTRAVENOUS
Status: DISCONTINUED | OUTPATIENT
Start: 2019-05-04 | End: 2019-05-05 | Stop reason: HOSPADM

## 2019-05-04 RX ORDER — HYDROCORTISONE 2.5 %
CREAM (GRAM) TOPICAL 3 TIMES DAILY PRN
Status: DISCONTINUED | OUTPATIENT
Start: 2019-05-04 | End: 2019-05-05 | Stop reason: HOSPADM

## 2019-05-04 RX ORDER — OXYTOCIN/0.9 % SODIUM CHLORIDE 30/500 ML
1-24 PLASTIC BAG, INJECTION (ML) INTRAVENOUS CONTINUOUS
Status: DISCONTINUED | OUTPATIENT
Start: 2019-05-04 | End: 2019-05-05 | Stop reason: HOSPADM

## 2019-05-04 RX ORDER — OXYCODONE HYDROCHLORIDE 5 MG/1
5 TABLET ORAL EVERY 4 HOURS PRN
Status: DISCONTINUED | OUTPATIENT
Start: 2019-05-04 | End: 2019-05-05 | Stop reason: HOSPADM

## 2019-05-04 RX ORDER — OXYTOCIN/0.9 % SODIUM CHLORIDE 30/500 ML
100 PLASTIC BAG, INJECTION (ML) INTRAVENOUS CONTINUOUS
Status: DISCONTINUED | OUTPATIENT
Start: 2019-05-04 | End: 2019-05-05 | Stop reason: HOSPADM

## 2019-05-04 RX ORDER — NALOXONE HYDROCHLORIDE 0.4 MG/ML
.1-.4 INJECTION, SOLUTION INTRAMUSCULAR; INTRAVENOUS; SUBCUTANEOUS
Status: DISCONTINUED | OUTPATIENT
Start: 2019-05-04 | End: 2019-05-05 | Stop reason: HOSPADM

## 2019-05-04 RX ORDER — ACETAMINOPHEN 325 MG/1
650 TABLET ORAL EVERY 6 HOURS PRN
Qty: 60 TABLET | Refills: 0 | Status: SHIPPED | OUTPATIENT
Start: 2019-05-04 | End: 2023-04-10

## 2019-05-04 RX ORDER — BISACODYL 10 MG
10 SUPPOSITORY, RECTAL RECTAL DAILY PRN
Status: DISCONTINUED | OUTPATIENT
Start: 2019-05-06 | End: 2019-05-05 | Stop reason: HOSPADM

## 2019-05-04 RX ORDER — ONDANSETRON 2 MG/ML
4 INJECTION INTRAMUSCULAR; INTRAVENOUS EVERY 6 HOURS PRN
Status: DISCONTINUED | OUTPATIENT
Start: 2019-05-04 | End: 2019-05-05 | Stop reason: HOSPADM

## 2019-05-04 RX ORDER — ACETAMINOPHEN 325 MG/1
650 TABLET ORAL EVERY 4 HOURS PRN
Status: DISCONTINUED | OUTPATIENT
Start: 2019-05-04 | End: 2019-05-05 | Stop reason: HOSPADM

## 2019-05-04 RX ORDER — AMOXICILLIN 250 MG
2 CAPSULE ORAL 2 TIMES DAILY
Status: DISCONTINUED | OUTPATIENT
Start: 2019-05-04 | End: 2019-05-05 | Stop reason: HOSPADM

## 2019-05-04 RX ORDER — IBUPROFEN 800 MG/1
800 TABLET, FILM COATED ORAL EVERY 6 HOURS PRN
Qty: 60 TABLET | Refills: 0 | Status: SHIPPED | OUTPATIENT
Start: 2019-05-04 | End: 2023-04-10

## 2019-05-04 RX ORDER — IBUPROFEN 800 MG/1
800 TABLET, FILM COATED ORAL
Status: COMPLETED | OUTPATIENT
Start: 2019-05-04 | End: 2019-05-04

## 2019-05-04 RX ORDER — IBUPROFEN 800 MG/1
800 TABLET, FILM COATED ORAL EVERY 6 HOURS PRN
Status: DISCONTINUED | OUTPATIENT
Start: 2019-05-04 | End: 2019-05-05 | Stop reason: HOSPADM

## 2019-05-04 RX ORDER — CARBOPROST TROMETHAMINE 250 UG/ML
250 INJECTION, SOLUTION INTRAMUSCULAR
Status: DISCONTINUED | OUTPATIENT
Start: 2019-05-04 | End: 2019-05-05 | Stop reason: HOSPADM

## 2019-05-04 RX ORDER — OXYCODONE AND ACETAMINOPHEN 5; 325 MG/1; MG/1
1 TABLET ORAL
Status: DISCONTINUED | OUTPATIENT
Start: 2019-05-04 | End: 2019-05-05 | Stop reason: HOSPADM

## 2019-05-04 RX ORDER — AMOXICILLIN 250 MG
1 CAPSULE ORAL 2 TIMES DAILY
Status: DISCONTINUED | OUTPATIENT
Start: 2019-05-04 | End: 2019-05-05 | Stop reason: HOSPADM

## 2019-05-04 RX ORDER — OXYTOCIN/0.9 % SODIUM CHLORIDE 30/500 ML
340 PLASTIC BAG, INJECTION (ML) INTRAVENOUS CONTINUOUS PRN
Status: DISCONTINUED | OUTPATIENT
Start: 2019-05-04 | End: 2019-05-05 | Stop reason: HOSPADM

## 2019-05-04 RX ORDER — LIDOCAINE 40 MG/G
CREAM TOPICAL
Status: DISCONTINUED | OUTPATIENT
Start: 2019-05-04 | End: 2019-05-05 | Stop reason: HOSPADM

## 2019-05-04 RX ADMIN — MISOPROSTOL 400 MCG: 200 TABLET ORAL at 00:03

## 2019-05-04 RX ADMIN — IBUPROFEN 800 MG: 800 TABLET ORAL at 06:07

## 2019-05-04 RX ADMIN — LIDOCAINE HYDROCHLORIDE 300 MG: 10 INJECTION, SOLUTION EPIDURAL; INFILTRATION; INTRACAUDAL; PERINEURAL at 00:06

## 2019-05-04 RX ADMIN — IBUPROFEN 800 MG: 800 TABLET ORAL at 18:03

## 2019-05-04 RX ADMIN — OXYTOCIN-SODIUM CHLORIDE 0.9% IV SOLN 30 UNIT/500ML 100 ML/HR: 30-0.9/5 SOLUTION at 00:30

## 2019-05-04 RX ADMIN — IBUPROFEN 800 MG: 800 TABLET ORAL at 00:23

## 2019-05-04 RX ADMIN — IBUPROFEN 800 MG: 800 TABLET ORAL at 12:11

## 2019-05-04 ASSESSMENT — ACTIVITIES OF DAILY LIVING (ADL)
TOILETING: 0-->INDEPENDENT
SWALLOWING: 0-->SWALLOWS FOODS/LIQUIDS WITHOUT DIFFICULTY
TRANSFERRING: 0-->INDEPENDENT
COGNITION: 0 - NO COGNITION ISSUES REPORTED
BATHING: 0-->INDEPENDENT
RETIRED_EATING: 0-->INDEPENDENT
DRESS: 0-->INDEPENDENT
FALL_HISTORY_WITHIN_LAST_SIX_MONTHS: NO
AMBULATION: 0-->INDEPENDENT
RETIRED_COMMUNICATION: 0-->UNDERSTANDS/COMMUNICATES WITHOUT DIFFICULTY

## 2019-05-04 NOTE — H&P
Labor and Delivery History and Physical    This is a late entry as I was in continuous attendance of the patient from the time of admission until delivery.      Jenny Santiago MRN# [id number]   Age: 28 year old YOB: 1990     Date of Admission: 5/3/2019    Primary care provider: Jessica Amin           CC:      labor         HPI:   I was called to the room to evaluate Jenny Santiago, a 28 year old  at 38w2d who presented in active labor.  Her fetus has Kenyatta Lowell syndrome.  She receives prenatal care with Dr. Jessica Amin at [facility] but she planned to diver at [facility] due to the need NICU evaluation of the baby.  Dr. Amin was present with the patient and had checked her cervix and found her to be 9+/high station, cephalic.  Pt had SROM with clear fluid en route to the hospital.      OB Problem List:   1. IUP at 38w2d   2. Fetus with Kenyatta Lowell syndrome, previously measuring LGA with poly, per Dr. Amin an ultrasound with week showed EFW 7 lbs 11 oz with ANKUR upper limit of normal  3. H/o 36 week VAVD  4. Maternal history of repaired omphalocele, prematurity           Pregnancy history:     OBSTETRIC HISTORY:    OB History    Para Term  AB Living   2 1 0 1 0 1   SAB TAB Ectopic Multiple Live Births   0 0 0 0 1      # Outcome Date GA Lbr Jonah/2nd Weight Sex Delivery Anes PTL Lv   2 Current            1   36w0d       ELI       Prenatal Labs:   Lab Results   Component Value Date    ABO O 2019    RH Pos 2019    AS Neg 2019    CHPCRT Negative 2019    GCPCRT Negative 2019    HGB 12.4 2019       GBS Status:   Lab Results   Component Value Date    GBS Negative 2019       Active Problem List  Patient Active Problem List   Diagnosis     Macrosomia of fetus affecting management of mother in third trimester, single or unspecified fetus     Polyhydramnios in third trimester complication, single or  unspecified fetus     History of omphalocele     Kenyatta-Wiedemann syndrome      contractions      labor in third trimester     Encounter for triage in pregnant patient     Encounter for supervision of high-risk pregnancy of young multigravida     Labor and delivery, indication for care     Abdominal pain affecting pregnancy      (normal spontaneous vaginal delivery)       Medication Prior to Admission  Medications Prior to Admission   Medication Sig Dispense Refill Last Dose     Cholecalciferol (VITAMIN D PO) 5000 units daily.   2019 at Unknown time     Prenatal Vit-Fe Fumarate-FA (PRENATAL MULTIVITAMIN W/IRON) 27-0.8 MG tablet Take 1 tablet by mouth daily   2019 at Unknown time           Maternal Past Medical History:     Past Medical History:   Diagnosis Date     Kenyatta-Wiedemann syndrome                        Family History:   No family history on file.         Social History:     Social History     Socioeconomic History     Marital status:      Spouse name: Not on file     Number of children: Not on file     Years of education: Not on file     Highest education level: Not on file   Occupational History     Not on file   Social Needs     Financial resource strain: Not on file     Food insecurity:     Worry: Not on file     Inability: Not on file     Transportation needs:     Medical: Not on file     Non-medical: Not on file   Tobacco Use     Smoking status: Never Smoker     Smokeless tobacco: Never Used   Substance and Sexual Activity     Alcohol use: Not Currently     Drug use: Never     Sexual activity: Not on file   Lifestyle     Physical activity:     Days per week: Not on file     Minutes per session: Not on file     Stress: Not on file   Relationships     Social connections:     Talks on phone: Not on file     Gets together: Not on file     Attends Sikhism service: Not on file     Active member of club or organization: Not on file     Attends meetings of clubs or  organizations: Not on file     Relationship status: Not on file     Intimate partner violence:     Fear of current or ex partner: Not on file     Emotionally abused: Not on file     Physically abused: Not on file     Forced sexual activity: Not on file   Other Topics Concern     Parent/sibling w/ CABG, MI or angioplasty before 65F 55M? Not Asked   Social History Narrative     Not on file            Review of Systems:   Negative except as noted above in the HPI         Physical Exam:     Vitals:    19 0000 19 0015   BP: 134/70 114/60   Resp: 18 18   Temp: 98.6  F (37  C)      Gen: Alert and oriented in NAD  Cardio: RRR  Resp: CTAB   Abdomen: gravid, soft, nontender. EFW 8 lbs  Cervix: lip/100/0  Presentation:Cephalic by SVE  Fetal Heart Rate Tracing: initially difficult to trace as pt was on hands and knees, having pain and not able to hold still.  What initially traced appeared to be at least variable decels to the 80-90s.  Tocometer: q3 min          Assessment:   Jenny Santiago is a 28 year old  at 38w2d admitted to active labor.  Fetus with Kenyatta Rosalba syndrome, NICU aware         Plan:     - Labor   - Admit to L&D    - PNC:     - Rh pos. Rubella immune. GBS negative.    - Membranes: ruptured   - Pain management: declines at this time    -Fetal Well Being   - Category II, CBC with plts, type and screen ordered.  IV placed, bolus started and O2 applied.   -expectant management.    Beckie Stanley MD, FACOG

## 2019-05-04 NOTE — PLAN OF CARE
Patient arrived to [facility] unit via wheelchair at 0220 ,with belongings, accompanied by spouse/ significant other, with infant in NICU . Received report from AVANI Landaverde  Unit and room orientation completd. Call light given; no concerns present at this time. Continue with plan of care.

## 2019-05-04 NOTE — DISCHARGE SUMMARY
[facility] Discharge Summary    Jenny Santiago MRN# [id number]   Age: 28 year old YOB: 1990     Date of Admission:  5/3/2019  Date of Discharge:  2019  Admitting Physician:  Beckie Stanley MD  Discharge Physician:  Beckie Stanley MD    Admit Dx:   - Intrauterine pregnancy at 38w1d   - Fetus with Kenyatta Rosalba syndrome, previously measuring LGA with poly, per Dr. Amin an ultrasound with week showed EFW 7 lbs 11 oz with ANKUR upper limit of normal  - H/o 36 week VAVD  - Maternal history of repaired omphalocele, prematurity    Discharge Dx:  - Same as above, s/p     Procedures:  - Spontaneous vaginal delivery    Admit HPI/Labor Course:  Jenny Santiago is a 28 year old  that presented in active labor. She receives prenatal care with Dr. Jsesica Amin at [facility] but she planned to diver at [facility] due to the need NICU evaluation of the baby.  Dr. Amin was present with the patient and had checked her cervix and found her to be 9+/high station, cephalic.  Pt had SROM with clear fluid en route to the hospital. Please see her Admission H&P and Delivery Summary for further details.    Jenny Santiago delivered a liveborn infant with apgars of 9 and 9 on 5/3/19 at 1153. Patient was fully dilated and pushing after 2  hours 15  minutes in active labor. Delivery was via vaginal, spontaneous  under none  anesthesia. Infant delivered in vertex  left  occiput  anterior  position. The shoulders were in direct transverse presentation, unable to restitute to AP, so delivered transverse. The cord was clamped, cut twice and 3 vessels  were noted. Cord blood was obtained in routine fashion. Placenta delivered at 2019 12:00 AM . Placental disposition was Pathology . Fundal massage performed and fundus found to be firm. Perineum, vagina, cervix were inspected, and she sustained a first degree perineal laceration and left vaginal laceration.  These lacerations were repaired in the usual fashion using 3-0 Vicryl, one figure of X in each small laceration under 1% lidocaine anesthesia. Excellent hemostasis was noted. Needle count correct. Infant and patient in delivery room in good and stable condition.  A NICU NNP remained in the delivery room for continuous monitoring of the infant for the first 30 minutes of life.    Postpartum Course:  Her postpartum course was uncomplicated. On PPD#2, she was meeting all of her postpartum goals and deemed stable for discharge. She was voiding without difficulty, tolerating a regular diet without nausea and vomiting, her pain was well controlled on oral pain medicines and her lochia was appropriate. Her Rh status was positive, and Rhogam was not indicated.     Discharge Medications:     Review of your medicines      START taking      Dose / Directions   acetaminophen 325 MG tablet  Commonly known as:  TYLENOL      Dose:  650 mg  Take 2 tablets (650 mg) by mouth every 6 hours as needed for mild pain  Quantity:  60 tablet  Refills:  0     ibuprofen 800 MG tablet  Commonly known as:  ADVIL/MOTRIN      Dose:  800 mg  Take 1 tablet (800 mg) by mouth every 6 hours as needed for other (cramping)  Quantity:  60 tablet  Refills:  0        CONTINUE these medicines which have NOT CHANGED      Dose / Directions   prenatal multivitamin w/iron 27-0.8 MG tablet      Dose:  1 tablet  Take 1 tablet by mouth daily  Refills:  0     VITAMIN D PO      5000 units daily.  Refills:  0           Where to get your medicines      These medications were sent to [facility] [address]  [address] [facility], [facility] [address]    Phone:  [phone]     acetaminophen 325 MG tablet    ibuprofen 800 MG tablet       Discharge/Disposition:  Jenny Santiago was discharged to home in stable condition with the following instructions/medications:  1) Call for temperature > 100.4, bright red vaginal bleeding >1  pad an hour x 2 hours, foul smelling vaginal discharge, pain not controlled by usual oral pain meds, persistent nausea and vomiting not controlled on medications  2) She was undecided for contraception.  3) For feeding she decided to breast pump.  4) She was instructed to follow-up with her primary OB in 6 weeks for a routine postpartum visit.    Sergio Najera MD  OB/GYN Resident, PGY-3  5/5/2019     The patient was seen and examined by me on the day of discharge.  I have reviewed and agree with the above note.    Beckie Stanley MD, FACOG

## 2019-05-04 NOTE — PLAN OF CARE
Patient has perineal discomfort from ambulation and  has been using ice and tucks which are helping. Takes motrin as well. Walks down to NICU to feed baby. Will continue with plan of care.

## 2019-05-04 NOTE — PLAN OF CARE
Pt assisted to bathroom with standby of 1. Unable to void at this time, pt had been straight cath'd immediately after delivery. Sondra cares provided. Sondra pad and underwear given, new gown given. Pt assisted to wc. Tolerating ambulation well. To NICU to visit . Pt moved to room 7136. Report given to Nadine VARMA. Pt oriented to room. Encouraged to call with questions or concerns.

## 2019-05-04 NOTE — PROGRESS NOTES
Post Partum Progress Note    Subjective:  No concerns, no pain, heavy bleeding.Tolerating regular diet. Voiding without difficulty. Baby in NICU.    Objective:  Patient Vitals for the past 24 hrs:   BP Temp Temp src Pulse Resp   19 0015 118/72 98  F (36.7  C) Oral 77 18   19 1800 122/71 97.6  F (36.4  C) Oral 66 16   19 0750 113/69 97.8  F (36.6  C) Oral 63 16     General: AAOx3, NAD, appears generally well  Resp:  Breathing comfortably on room air  Abd:  Soft, nontender, nondistended, fundus firm at approximately 3 cm below umbilicus  Ext:  no edema in bilateral LE    Assessment and Plan:  28 year old old  now PPD#2 s/p  following spontaneous labor. She is doing well in the postpartum period.     - Pain: Tylenol, ibuprofen PRN  - Heme: Hgb 12.4 >  > AM pending  - GI: Regular diet. Bowel regimen. Antiemetics PRN.  - : Voiding spontaneously  - Rh positive, Rubella immune  - Baby with Kenyatta Rosalba syndrome, in NICU  - Contraception: undecided, will discuss w/ primary OB at appointment this coming Friday.    Anticipate discharge to home today    Sergio Najera MD  OB/GYN Resident, PGY-3  2019     The patient was seen and examined by me separately from the team.  I have reviewed and agree with the above note.  She is doing well today, baby is in the room, ready for discharge.  Plans to see Dr. Amin at the end of the week, will discuss contraception with her.  Discharge to home today as above.    Hemoglobin   Date Value Ref Range Status   2019 11.0 (L) 11.7 - 15.7 g/dL Final       Beckie Stanley MD, FACOG

## 2019-05-04 NOTE — PROGRESS NOTES
Late Entry    Upon my arrival to the room at the time of the patient's admission, she had been checked by her primary provider, Dr. Amin, and found to be 9//high.  The patient was on hands and needs, grossly ruptured with clear fluid and saying she felt a lot of pressure with contractions.  The fetus was not able to be monitored well at that point, due to maternal position and movement, but at least variable decelerations to the 80-90s were noted.  At that point she was turned back to a supine position and on my exam was 9+/100/0, EFW 8#.  An IV was placed, stat labs were collected and sent, a fluid bolus was stated and O2 applied.  The external fetal monitor was applied and a FHR baseline of 120 was noted with variable decels to the 80s with contractions.  She declined pain medication and was not able to stop pushing with contractions at that point.  On my next exam she was found to be complete/0.  She continues pushing with good effort, the FHR tracing remained reassuring and the variables because less frequent and less deep as she continued to push.  Pitocin augmentation was started as the fetus remained at a 0 station with little descent over 30 minutes of pushing with contractions that palpated moderate.  The maximum rate was 1 mU/min.  She went on to have a  of a liveborn female infant over an intact perineum.  The infant delivered from the RON presentation but with shoulders directly transverse.  I was unable to turn the shoulders AP, so delivered transverse.  The infant was vigorous at delivery and delayed cord clamping was done with the NICU team in attendance.  Apgars were 9 and 9, birth wieght 7 lbs 6 oz.  The placenta was noted to be intact with a 3VC.  She had small left labial and vaginal lacerations that were repaired with figure of X sutures of 3-0 vicryl under 1% lidocaine.  The QBL was 194.  She received 400 mcg of oral Cytotec for prophylaxis given a rapid 1st stage.  She remained in the  delivery room in stable condition.  The infant was admitted to the NICU as planned for further evaluation.    Beckie Stanley MD, FACOG

## 2019-05-04 NOTE — L&D DELIVERY NOTE
OB Vaginal Delivery Note    Jenny Santiago MRN# [id number]   Age: 28 year old YOB: 1990       GA: 38w2d  GP:   Labor Complications: None   EBL:    mL  QBL: 194 mL  Delivery Type: Vaginal, Spontaneous   ROM to Delivery Time: 1h 08m  [place] Weight:      1 Minute 5 Minute 10 Minute   Apgar Totals: 9    9                Delivery Details:  Jenny Santiago, a 28 year old  female delivered a liveborn infant with apgars of 9   and 9  . Patient was fully dilated and pushing after 2  hours 15  minutes in active labor. Delivery was via vaginal, spontaneous  under none  anesthesia. Infant delivered in vertex  left  occiput  anterior  position. The shoulders were in direct transverse presentation, unable to restitute to AP, so delivered transverse. The cord was clamped, cut twice and 3 vessels  were noted. Cord blood was obtained in routine fashion.    Cord complications:     Placenta delivered at 2019 12:00 AM . Placental disposition was Pathology . Fundal massage performed and fundus found to be firm.     Episiotomy: none    Perineum, vagina, cervix were inspected, and the following lacerations were noted:   Perineal lacerations: 1st;none      labial laceration: left     vaginal laceration noted         Any lacerations were repaired in the usual fashion using 3-0 Vicryl, one figure of X in each small laceration under 1% lidocaine anesthesia.    Excellent hemostasis was noted. Needle count correct. Infant and patient in delivery room in good and stable condition.  A NICU NNP remained in the delivery room for continuous monitoring of the infant for the first 30 minutes of life.         Labor Event Times    Labor onset date:  5/3/19 Onset time:   9:00 PM   Dilation complete date:  5/3/19 Complete time:  11:15 PM   Start pushing date/time:  5/3/2019 2315      Labor Length    1st Stage (hrs):  2 (min):  15   2nd Stage (hrs):  0 (min):  38   3rd Stage (hrs):  0 (min):  7      Labor Events      labor?:  No   steroids:  None  Labor Type:  Spontaneous  Predominate monitoring during 1st stage:  continuous electronic fetal monitoring     Antibiotics received during labor?:  No     Rupture date/time: 5/3/19 2245   Rupture type:  Spontaneous rupture of membranes occuring during spontaneous labor or augmentation  Fluid color:  Clear  Fluid odor:  Normal     1:1 continuous labor support provided by?:  RN Labor partogram used?:  no      Delivery/Placenta Date and Time    Delivery Date:  5/3/19 Delivery Time:  11:53 PM   Placenta Date/Time:  2019 12:00 AM  Oxytocin given at the time of delivery:  after delivery of baby     Vaginal Counts     Initial count performed by 2 team members:   Two Team Members   Dr Jaden Solomon RN       [employer]   Initial counts 2  5    Added to count       Final counts 3  5    Placed during labor Accounted for at the end of labor   No    No    No     Final count performed by 2 team members:   Two Team Members   Dr Jaden Solomon RN      Final count correct?:  Yes     Apgars    Living status:  Living   1 Minute 5 Minute 10 Minute 15 Minute 20 Minute   Skin color: 1  1       Heart rate: 2  2       Reflex irritability: 2  2       Muscle tone: 2  2       Respiratory effort: 2  2       Total: 9  9       Apgars assigned by:  ARA GARCIA,CNP     Cord    Vessels:  3 Vessels       [place] Resuscitation    Methods:  Temp Skin Control, Temp Skin Probe, Oximetry   Care at Delivery:  NICU team called to attend delivery on behalf of Dr Stanley for the precipitous delivery of a term infant with possible Kenyatta Wiedemann syndrome. Infant cried immediately after her birth and was placed upon her mother abdomen. She turned pink and remained vigorous. She received 1 minute of delayed cord clamping and was brought to the radiant warmer for work of breathing assessment. On exam, infant noted to have mild macroglossia.  Umbilical cord and abdomen examined. No omphalocele or umbilical hernia noted. Umbilical cord was cut long. Infant saturating in the high 90s on room air. Infant attempted to breast feed for the next 30 minutes. NNP present and infant monitored. Parents were updated. She was then transferred to the NICU for further management.    Lawanda GARCIA CNP 5/4/2019 12:54 AM     Labor Events and Shoulder Dystocia    Fetal Tracing Prior to Delivery:  Category 2  Fetal Tracing Comments:  variables with pushing  Shoulder dystocia present?:  Neg     Delivery (Maternal) (Provider to Complete) (189808)    Episiotomy:  None  Perineal lacerations:  1st, None    Labial laceration:  left Repaired?:  Yes   Vaginal laceration?:  Yes Repaired?:  Yes   Number of repair packets:  1     Blood Loss  Mother: Marylin Santiago #[id number]   Start of Mother's Information    IO Blood Loss  05/03/19 2100 - 05/04/19 0656    Total QBL Blood Loss (mL) Hospital Encounter 442 mL    Total  442 mL         End of Mother's Information  Mother: Marylin Santiago #[id number]         Delivery - Provider to Complete ([id number])    Delivering clinician:  Beckie Stanley MD  Attempted Delivery Types (Choose all that apply):  Spontaneous Vaginal Delivery  Delivery Type (Choose the 1 that will go to the Birth History):  Vaginal, Spontaneous          Placenta    Delayed Cord Clamping:  Done  Date/Time:  5/4/2019 12:00 AM  Removal:  Expressed  Disposition:  Pathology     Anesthesia    Method:  None          Presentation and Position    Presentation:  Vertex  Position:  Left Occiput Anterior           Beckie Stanley MD

## 2019-05-04 NOTE — PLAN OF CARE
Data: Vital signs and postpartum checks WDL  Patient eating and drinking normally. Patient able to empty bladder independently and is up ambulating.  Patient performing self cares and is able to care for infant in NICU. Breastfeeding on demand in NICU.  Action: Patient medicated during the shift for pain with Ibuprofen with relief after 1 hour. Patient education done see education record.  Response: Positive attachment behaviors observed with infant. Support persons  present.    Plan: Continue with the plan of care

## 2019-05-04 NOTE — PLAN OF CARE
Patient arrived at 2250 from home reporting increase in intensity of contractions since 2100. SROM of clear fluid ~ 2245 while en route to hospital. FOB present for support. Dr. Stanley called to bedside to assess variable decels and status of labor. IV started labs collected, IVF bolus and oxygen therapy initiated, while repositioning patient L/R. FSE placement attempted. Patient complete and pushing at 2315. Report given to Saima LE RN at 2330.

## 2019-05-04 NOTE — PLAN OF CARE
Vaginal Delivery Note   of viable Female with Dr Stanley in attendance.  NICU/Nursery RN  present.  Infant with spontaneous cry, to mother's abdomen, dried and stimulated.  APGAR at 1 minute:  9 and APGAR at 5 minutes:  9.  Placenta delivered with out complication, 1st degree laceration, with repair, neeraj cares provided.  Mother and baby in stable condition.

## 2019-05-04 NOTE — PROGRESS NOTES
Post Partum Progress Note    Subjective:  No concerns, no pain, heavy bleeding.Tolerating regular diet. Voiding without difficulty. Baby in NICU.    Objective:  Patient Vitals for the past 24 hrs:   BP Temp Temp src Pulse Resp   19 0230 129/69 98.4  F (36.9  C) Oral 65 17   19 0137 127/78 -- -- -- 18   19 0120 116/59 -- -- -- 18   19 0105 124/70 -- -- -- 18   19 0050 127/71 -- -- -- 18   19 0035 140/74 -- -- -- 18   19 0015 114/60 -- -- -- 18   19 0000 134/70 98.6  F (37  C) -- -- 18       General: AAOx3, NAD, appears generally well  Resp:  Breathing comfortably on room air  Abd:  Soft, nontender, nondistended, fundus firm at approximately 3 cm below umbilicus  Ext:  no edema in bilateral LE      Assessment and Plan:  28 year old old  now PPD#1 s/p  following spontaneous labor. She is doing well in the postpartum period.     - Pain: Tylenol, ibuprofen PRN  - Heme: Hgb 12.4 >  > AM pending  - GI: Regular diet. Bowel regimen. Antiemetics PRN.  - : Voiding spontaneously  - Rh positive, Rubella immune  - Baby with Kenyatta Rosalba syndrome, in NICU    Anticipate discharge to home PPD#2    Avani Hicks, PGY3  OB/GYN Resident  4:50 AM, 2019    I have seen and examined the patient without the resident. I have reviewed, edited, and agree with the note.   My findings are: Appears well.   Abdomen: soft, NT, ND.     Hemoglobin   Date Value Ref Range Status   2019 12.4 11.7 - 15.7 g/dL Final   2019 10.8 (L) 11.7 - 15.7 g/dL Final       Misa Beltre MD

## 2019-05-05 ENCOUNTER — RECORDS - HEALTHEAST (OUTPATIENT)
Dept: ADMINISTRATIVE | Facility: OTHER | Age: 29
End: 2019-05-05

## 2019-05-05 VITALS
SYSTOLIC BLOOD PRESSURE: 114 MMHG | RESPIRATION RATE: 18 BRPM | TEMPERATURE: 97.3 F | HEART RATE: 59 BPM | DIASTOLIC BLOOD PRESSURE: 68 MMHG

## 2019-05-05 LAB — HGB BLD-MCNC: 11 G/DL (ref 11.7–15.7)

## 2019-05-05 PROCEDURE — 36415 COLL VENOUS BLD VENIPUNCTURE: CPT | Performed by: OBSTETRICS & GYNECOLOGY

## 2019-05-05 PROCEDURE — 25000132 ZZH RX MED GY IP 250 OP 250 PS 637: Performed by: OBSTETRICS & GYNECOLOGY

## 2019-05-05 PROCEDURE — 85018 HEMOGLOBIN: CPT | Performed by: OBSTETRICS & GYNECOLOGY

## 2019-05-05 RX ADMIN — IBUPROFEN 800 MG: 800 TABLET ORAL at 00:38

## 2019-05-05 RX ADMIN — IBUPROFEN 800 MG: 800 TABLET ORAL at 07:40

## 2019-05-05 RX ADMIN — SENNOSIDES AND DOCUSATE SODIUM 1 TABLET: 8.6; 5 TABLET ORAL at 08:36

## 2019-05-05 NOTE — PLAN OF CARE
VSS. Postpartum assessment WNL. Reports minimal pain - taking ibuprofen approximately every 6 hours. She is breastfeeding baby with minimal assistance. Demonstration for hand expression done. She is easily able to express colostrum with hand expression. Partner at bedside overnight. No concerns at this time. Continue plan of care.

## 2019-05-05 NOTE — PLAN OF CARE
Discharge and home med instructions discussed with patient, all questions answered. Patient knows to make a 6 week appt with her provider. Patient is discharged to [facility] status and staying in room with her infant who will remain a patient.

## 2019-05-10 NOTE — TELEPHONE ENCOUNTER
I called 5/10/19 to follow-up regarding  insurance coverage for genetic testing. However, I was unable to reach Marylin.  I left a non-detailed voicemail with my name and phone number.  Wanda Serna    Division of Genetics  [facility]  P: [phone]

## 2019-05-21 NOTE — TELEPHONE ENCOUNTER
I called 5/21/19 to follow-up regarding insurance coverage for genetic testing. However, I was unable to reach Marylin.  I left a non-detailed voicemail with my name and phone number.    Wanda Serna    Division of Genetics  [facility]  P: [phone]

## 2019-05-27 LAB — COPATH REPORT: NORMAL

## 2019-05-30 ENCOUNTER — COMMUNICATION - HEALTHEAST (OUTPATIENT)
Dept: SCHEDULING | Facility: CLINIC | Age: 29
End: 2019-05-30

## 2019-05-31 ENCOUNTER — OFFICE VISIT - HEALTHEAST (OUTPATIENT)
Dept: FAMILY MEDICINE | Facility: CLINIC | Age: 29
End: 2019-05-31

## 2019-05-31 DIAGNOSIS — N93.9 VAGINAL BLEEDING: ICD-10-CM

## 2019-05-31 ASSESSMENT — MIFFLIN-ST. JEOR: SCORE: 1477.92

## 2019-07-02 ENCOUNTER — OFFICE VISIT - HEALTHEAST (OUTPATIENT)
Dept: FAMILY MEDICINE | Facility: CLINIC | Age: 29
End: 2019-07-02

## 2019-07-02 LAB
ALBUMIN UR-MCNC: NEGATIVE MG/DL
APPEARANCE UR: CLEAR
BACTERIA #/AREA URNS HPF: ABNORMAL HPF
BILIRUB UR QL STRIP: NEGATIVE
COLOR UR AUTO: YELLOW
GLUCOSE UR STRIP-MCNC: NEGATIVE MG/DL
HGB BLD-MCNC: 14 G/DL (ref 12–16)
HGB UR QL STRIP: NEGATIVE
KETONES UR STRIP-MCNC: NEGATIVE MG/DL
LEUKOCYTE ESTERASE UR QL STRIP: ABNORMAL
MUCOUS THREADS #/AREA URNS LPF: ABNORMAL LPF
NITRATE UR QL: NEGATIVE
PH UR STRIP: 5 [PH] (ref 5–8)
RBC #/AREA URNS AUTO: ABNORMAL HPF
SP GR UR STRIP: 1.01 (ref 1–1.03)
SQUAMOUS #/AREA URNS AUTO: ABNORMAL LPF
UROBILINOGEN UR STRIP-ACNC: ABNORMAL
WBC #/AREA URNS AUTO: ABNORMAL HPF

## 2019-07-03 ENCOUNTER — COMMUNICATION - HEALTHEAST (OUTPATIENT)
Dept: FAMILY MEDICINE | Facility: CLINIC | Age: 29
End: 2019-07-03

## 2019-07-03 LAB
BACTERIA SPEC CULT: NO GROWTH
BKR LAB AP ABNORMAL BLEEDING: NO
BKR LAB AP BIRTH CONTROL/HORMONES: NORMAL
BKR LAB AP CERVICAL APPEARANCE: NORMAL
BKR LAB AP GYN ADEQUACY: NORMAL
BKR LAB AP GYN INTERPRETATION: NORMAL
BKR LAB AP HPV REFLEX: NORMAL
BKR LAB AP LMP: NORMAL
BKR LAB AP PATIENT STATUS: NORMAL
BKR LAB AP PREVIOUS ABNORMAL: NORMAL
BKR LAB AP PREVIOUS NORMAL: NORMAL
C TRACH DNA SPEC QL PROBE+SIG AMP: NEGATIVE
HIGH RISK?: NO
N GONORRHOEA DNA SPEC QL NAA+PROBE: NEGATIVE
PATH REPORT.COMMENTS IMP SPEC: NORMAL
RESULT FLAG (HE HISTORICAL CONVERSION): NORMAL

## 2020-02-17 ENCOUNTER — TELEPHONE (OUTPATIENT)
Dept: CONSULT | Facility: CLINIC | Age: 30
End: 2020-02-17

## 2020-02-17 ENCOUNTER — DOCUMENTATION ONLY (OUTPATIENT)
Dept: CONSULT | Facility: CLINIC | Age: 30
End: 2020-02-17

## 2020-02-17 NOTE — TELEPHONE ENCOUNTER
Called Marylin to verify insurance coverage. She was unavailable and I left a non detailed VM.    Vinita Jack  Department   Department of Genetics  P:[phone]

## 2020-02-18 ENCOUNTER — TELEPHONE (OUTPATIENT)
Dept: CONSULT | Facility: CLINIC | Age: 30
End: 2020-02-18

## 2020-02-18 NOTE — TELEPHONE ENCOUNTER
Called Marylin to discuss insurance coverage and to verify insurance coverage. She was unavailable and I left a non-detailed voicemail.    Vinita Jack  Department   Department of Genetics  P:[phone]

## 2020-02-21 ENCOUNTER — TELEPHONE (OUTPATIENT)
Dept: CONSULT | Facility: CLINIC | Age: 30
End: 2020-02-21

## 2020-02-21 NOTE — TELEPHONE ENCOUNTER
Called Jenny to discuss insurance coverage.  She was unavailable and I left a non-detailed voicemail.     Vinita Jack  Department   Department of Genetics  P:[phone]

## 2020-02-25 ENCOUNTER — TELEPHONE (OUTPATIENT)
Dept: CONSULT | Facility: CLINIC | Age: 30
End: 2020-02-25

## 2020-02-25 NOTE — TELEPHONE ENCOUNTER
Called Marylin to verify insurance coverage. She was unavailable and I left a non detailed VM.     Vinita Jack  Department   Department of Genetics  P:[phone]

## 2020-02-28 ENCOUNTER — TELEPHONE (OUTPATIENT)
Dept: CONSULT | Facility: CLINIC | Age: 30
End: 2020-02-28

## 2020-02-28 NOTE — TELEPHONE ENCOUNTER
Spoke with Marylin after contacting insurance. She wants to sit on the decision and get back to us when she has made a decision.    Vinita Jack  Department   Department of Genetics  P:[phone]

## 2020-02-28 NOTE — TELEPHONE ENCOUNTER
Marylin called to discuss insurance coverage. She told me her plan changed but it is still [employer] choice. Her group number is now #[id number]. I told her I would look into insurance and will contact her when I have information,    Vinita Jack  Department   Department of Genetics  P:[phone]

## 2020-03-13 ENCOUNTER — TELEPHONE (OUTPATIENT)
Dept: CONSULT | Facility: CLINIC | Age: 30
End: 2020-03-13

## 2020-03-13 NOTE — TELEPHONE ENCOUNTER
Called Marylin to check and see if she had made a decision. She said she was still thinking about her decision. She said she would call back when she made a decision.    Vinita Jack  Department   Department of Genetics  P:[phone]

## 2020-07-07 ENCOUNTER — VIRTUAL VISIT (OUTPATIENT)
Dept: FAMILY MEDICINE | Facility: OTHER | Age: 30
End: 2020-07-07

## 2020-07-07 ENCOUNTER — AMBULATORY - HEALTHEAST (OUTPATIENT)
Dept: FAMILY MEDICINE | Facility: CLINIC | Age: 30
End: 2020-07-07

## 2020-07-07 DIAGNOSIS — Z20.822 SUSPECTED COVID-19 VIRUS INFECTION: ICD-10-CM

## 2020-07-07 NOTE — PROGRESS NOTES
"Date: 2020 08:46:08  Clinician: Brennan Shane  Clinician NPI: [id number]  Patient: Jenny Santiago  Patient : 1990  Patient Address: [address], [place] [address]  Patient Phone: [phone]  Visit Protocol: URI  Patient Summary:  Jenny is a 29 year old ( : 1990 ) female who initiated a Visit for COVID-19 (Coronavirus) evaluation and screening. When asked the question \"Please sign me up to receive news, health information and promotions from [employer].\", Jenny responded \"No\".    Jenny states her symptoms started 1-2 days ago.   Her symptoms consist of rhinitis, malaise, a headache, a sore throat, myalgia, a cough, and nasal congestion. She is experiencing difficulty breathing due to nasal congestion but she is not short of breath.   Symptom details     Nasal secretions: The color of her mucus is yellow.    Cough: Jenny coughs a few times an hour and her cough is not more bothersome at night. Phlegm does not come into her throat when she coughs. She does not believe her cough is caused by post-nasal drip.     Sore throat: Jenny reports having moderate throat pain (4-6 on a 10 point pain scale), does not have exudate on her tonsils, and can swallow liquids. She is not sure if the lymph nodes in her neck are enlarged. A rash has not appeared on the skin since the sore throat started.     Headache: She states the headache is moderate (4-6 on a 10 point pain scale).      Jenny denies having wheezing, nausea, teeth pain, ageusia, diarrhea, vomiting, ear pain, chills, anosmia, facial pain or pressure, and fever. She also denies having recent facial or sinus surgery in the past 60 days, taking antibiotic medication in the past month, and having a sinus infection within the past year.   Precipitating events  Within the past week, Jenny has not been exposed to someone with strep throat. She has not recently been exposed to someone with influenza. Jenny has been in " close contact with the following high risk individuals: pregnant women, children under the age of 5, people with asthma, heart disease or diabetes, immunocompromised people, and adults 65 or older.   Pertinent COVID-19 (Coronavirus) information  In the past 14 days, Jenny has not worked in a congregate living setting.   She either works or volunteers as a healthcare worker or a , or works or volunteers in a healthcare facility. She provides direct patient care. 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 She lives with a healthcare worker.   Jenny has not had a close contact with a laboratory-confirmed COVID-19 patient within 14 days of symptom onset.   Pertinent medical history  Jenny does not get yeast infections when she takes antibiotics.   Jenny needs a return to work/school note.   Weight: 155 lbs   Jenny does not smoke or use smokeless tobacco.   She denies pregnancy and is breastfeeding. She has menstruated in the past month.   Weight: 155 lbs    MEDICATIONS: Vitamin D3 oral, prenatal vitamins with calcium-iron fumarate-folic acid oral, ALLERGIES: Penicillins  Clinician Response:  Dear Jenny,   Your symptoms show that you may have coronavirus (COVID-19). This illness can cause fever, cough and trouble breathing. Many people get a mild case and get better on their own. Some people can get very sick.  What should I do?  We would like to test you for this virus.   1. Please call [phone] to schedule your visit. Explain that you were referred by [facility] to have a COVID-19 test. Be ready to share your [facility] visit ID number.  The following will serve as your written order for this COVID Test, ordered by me, for the indication of suspected COVID [Z20.828]: The test will be ordered in [employer], our electronic health record,  "after you are scheduled. It will show as ordered and authorized by Gato Naranjo MD.  Order: COVID-19 (Coronavirus) PCR for SYMPTOMATIC testing from [facility].      2. When it's time for your COVID test:  Stay at least 6 feet away from others. (If someone will drive you to your test, stay in the backseat, as far away from the  as you can.)   Cover your mouth and nose with a mask, tissue or washcloth.  Go straight to the testing site. Don't make any stops on the way there or back.      3.Starting now: Stay home and away from others (self-isolate) until:   You've had no fever---and no medicine that reduces fever---for 3 full days (72 hours). And...   Your other symptoms have gotten better. For example, your cough or breathing has improved. And...   At least 10 days have passed since your symptoms started.       During this time, don't leave the house except for testing or medical care.   Stay in your own room, even for meals. Use your own bathroom if you can.   Stay away from others in your home. No hugging, kissing or shaking hands. No visitors.  Don't go to work, school or anywhere else.    Clean \"high touch\" surfaces often (doorknobs, counters, handles, etc.). Use a household cleaning spray or wipes. You'll find a full list of  on the EPA website: www.epa.gov/pesticide-registration/list-n-disinfectants-use-against-sars-cov-2.   Cover your mouth and nose with a mask, tissue or washcloth to avoid spreading germs.  Wash your hands and face often. Use soap and water.  Caregivers in these groups are at risk for severe illness due to COVID-19:  o People 65 years and older  o People who live in a nursing home or long-term care facility  o People with chronic disease (lung, heart, cancer, diabetes, kidney, liver, immunologic)  o People who have a weakened immune system, including those who:   Are in cancer treatment  Take medicine that weakens the immune system, such as corticosteroids  Had a bone marrow or organ " transplant  Have an immune deficiency  Have poorly controlled HIV or AIDS  Are obese (body mass index of 40 or higher)  Smoke regularly   o Caregivers should wear gloves while washing dishes, handling laundry and cleaning bedrooms and bathrooms.  o Use caution when washing and drying laundry: Don't shake dirty laundry, and use the warmest water setting that you can.  o For more tips, go to www.cdc.gov/coronavirus/2019-ncov/downloads/10Things.pdf.    4.Sign up for [employer]. We know it's scary to hear that you might have COVID-19. We want to track your symptoms to make sure you're okay over the next 2 weeks. Please look for an email from [employer]---this is a free, online program that we'll use to keep in touch. To sign up, follow the link in the email. Learn more at [email or web address]  How can I take care of myself?   Get lots of rest. Drink extra fluids (unless a doctor has told you not to).   Take Tylenol (acetaminophen) for fever or pain. If you have liver or kidney problems, ask your family doctor if it's okay to take Tylenol.   Adults can take either:    650 mg (two 325 mg pills) every 4 to 6 hours, or...   1,000 mg (two 500 mg pills) every 8 hours as needed.    Note: Don't take more than 3,000 mg in one day. Acetaminophen is found in many medicines (both prescribed and over-the-counter medicines). Read all labels to be sure you don't take too much.   For children, check the Tylenol bottle for the right dose. The dose is based on the child's age or weight.    If you have other health problems (like cancer, heart failure, an organ transplant or severe kidney disease): Call your specialty clinic if you don't feel better in the next 2 days.       Know when to call 911. Emergency warning signs include:    Trouble breathing or shortness of breath Pain or pressure in the chest that doesn't go away Feeling confused like you haven't felt before, or not being able to wake up Bluish-colored lips or  face.  Where can I get more information?   [facility] -- About COVID-19: [email or web address]   CDC -- What to Do If You're Sick: www.cdc.gov/coronavirus/2019-ncov/about/steps-when-sick.html   CDC -- Ending Home Isolation: www.cdc.gov/coronavirus/2019-ncov/hcp/disposition-in-home-patients.html   [facility] -- Caring for Someone: www.cdc.gov/coronavirus/2019-ncov/if-you-are-sick/care-for-someone.html   [facility] -- Interim Guidance for Hospital Discharge to Home: www.[facility].[place]/diseases/coronavirus/hcp/hospdischarge.pdf   [facility] clinical trials (COVID-19 research studies): clinicalaffairs.[facility]-clinical-trials    Below are the COVID-19 hotlines at the [place] Department of [facility] ([facility]). Interpreters are available.    For health questions: Call 943-228-1438 or 1-435.159.9579 (7 a.m. to 7 p.m.) For questions about schools and childcare: Call 590-926-4733 or 1-328.696.4288 (7 a.m. to 7 p.m.)    Diagnosis: Other malaise  Diagnosis ICD: R53.81

## 2020-07-08 ENCOUNTER — AMBULATORY - HEALTHEAST (OUTPATIENT)
Dept: FAMILY MEDICINE | Facility: CLINIC | Age: 30
End: 2020-07-08

## 2020-07-08 ENCOUNTER — COMMUNICATION - HEALTHEAST (OUTPATIENT)
Dept: FAMILY MEDICINE | Facility: CLINIC | Age: 30
End: 2020-07-08

## 2020-07-08 DIAGNOSIS — Z20.822 SUSPECTED COVID-19 VIRUS INFECTION: ICD-10-CM

## 2020-07-11 ENCOUNTER — COMMUNICATION - HEALTHEAST (OUTPATIENT)
Dept: FAMILY MEDICINE | Facility: CLINIC | Age: 30
End: 2020-07-11

## 2020-07-12 ENCOUNTER — COMMUNICATION - HEALTHEAST (OUTPATIENT)
Dept: FAMILY MEDICINE | Facility: CLINIC | Age: 30
End: 2020-07-12

## 2020-07-16 ENCOUNTER — COMMUNICATION - HEALTHEAST (OUTPATIENT)
Dept: HEALTH INFORMATION MANAGEMENT | Facility: CLINIC | Age: 30
End: 2020-07-16

## 2020-07-17 ENCOUNTER — TELEPHONE (OUTPATIENT)
Dept: CONSULT | Facility: CLINIC | Age: 30
End: 2020-07-17

## 2020-07-17 NOTE — TELEPHONE ENCOUNTER
Follow up call to see if they had any more thoughts on genetic testing. Left a VM.    Vinita Jack  Department   Department of Genetics  P:[phone]

## 2020-07-23 ENCOUNTER — TELEPHONE (OUTPATIENT)
Dept: CONSULT | Facility: CLINIC | Age: 30
End: 2020-07-23

## 2020-07-23 NOTE — TELEPHONE ENCOUNTER
Follow up call to see if they had any more thoughts on genetic testing. Left a VM.     Vinita Jack  Department   Department of Genetics  P:[phone]

## 2020-07-24 ENCOUNTER — COMMUNICATION - HEALTHEAST (OUTPATIENT)
Dept: FAMILY MEDICINE | Facility: CLINIC | Age: 30
End: 2020-07-24

## 2020-07-24 DIAGNOSIS — Z20.822 EXPOSURE TO 2019 NOVEL CORONAVIRUS: ICD-10-CM

## 2020-07-27 ENCOUNTER — AMBULATORY - HEALTHEAST (OUTPATIENT)
Dept: LAB | Facility: CLINIC | Age: 30
End: 2020-07-27

## 2020-07-27 DIAGNOSIS — Z20.822 EXPOSURE TO 2019 NOVEL CORONAVIRUS: ICD-10-CM

## 2020-07-28 ENCOUNTER — TELEPHONE (OUTPATIENT)
Dept: CONSULT | Facility: CLINIC | Age: 30
End: 2020-07-28

## 2020-07-28 NOTE — TELEPHONE ENCOUNTER
Follow up call to see if they had any more thoughts on genetic testing. Left a VM stating this was our last attempt and to please reach out at the number provided.      Vinita Jack  Department   Department of Genetics  P:[phone]

## 2020-07-29 ENCOUNTER — COMMUNICATION - HEALTHEAST (OUTPATIENT)
Dept: FAMILY MEDICINE | Facility: CLINIC | Age: 30
End: 2020-07-29

## 2020-07-31 ENCOUNTER — COMMUNICATION - HEALTHEAST (OUTPATIENT)
Dept: SCHEDULING | Facility: CLINIC | Age: 30
End: 2020-07-31

## 2020-09-01 ENCOUNTER — COMMUNICATION - HEALTHEAST (OUTPATIENT)
Dept: FAMILY MEDICINE | Facility: CLINIC | Age: 30
End: 2020-09-01

## 2020-09-15 NOTE — PROGRESS NOTES
Late entry:  2/17/2020    After our last appointment Marylin ultimately decided not to proceed with genetic testing for herself before her daughter was born due to high out of pocket costs and elected to have her daughter seen for genetics evaluation and testing after birth (details outlined in daughter's chart). After this evaluation was completed, Dr. Rios again recommended that Marylin consider testing in herself (outlined in phone note 9/18/19 in daughter's chart). Marylin and I spoke about these options again during a phone call 11/15/19 (outlined in daughter's chart). I contacted Marylin on 2/17/2020 and we reviewed these options again and a follow-up email was sent to her outlining options (counseled email is an insecure form of communication, Marylin agreed to receive via email). At that time we submitted a new prior authorization to insurance. Marylin was encouraged to contact us with any questions or concerns.     Marilu Olivares  Licensed Genetic Counselor  [phone]

## 2020-09-16 ENCOUNTER — TELEPHONE (OUTPATIENT)
Dept: CONSULT | Facility: CLINIC | Age: 30
End: 2020-09-16

## 2020-09-16 NOTE — TELEPHONE ENCOUNTER
I attempted to contact Marylin to let her know that I will be leaving [facility] at the end of the week. If she would like to proceed with testing after that time she is encouraged to call our , Vinita, at [phone] to be connected to another counselor who can assist her.     Marilu Olivares  Licensed Genetic Counselor  [phone]

## 2021-03-04 ENCOUNTER — APPOINTMENT (OUTPATIENT)
Dept: URBAN - METROPOLITAN AREA CLINIC 260 | Age: 31
Setting detail: DERMATOLOGY
End: 2021-03-04

## 2021-03-04 VITALS — WEIGHT: 150 LBS | HEIGHT: 63.5 IN | RESPIRATION RATE: 16 BRPM

## 2021-03-04 DIAGNOSIS — D485 NEOPLASM OF UNCERTAIN BEHAVIOR OF SKIN: ICD-10-CM

## 2021-03-04 DIAGNOSIS — Z71.89 OTHER SPECIFIED COUNSELING: ICD-10-CM

## 2021-03-04 DIAGNOSIS — Q819 OTHER SPECIFIED ANOMALIES OF SKIN: ICD-10-CM

## 2021-03-04 DIAGNOSIS — L81.4 OTHER MELANIN HYPERPIGMENTATION: ICD-10-CM

## 2021-03-04 DIAGNOSIS — D18.0 HEMANGIOMA: ICD-10-CM

## 2021-03-04 DIAGNOSIS — L82.1 OTHER SEBORRHEIC KERATOSIS: ICD-10-CM

## 2021-03-04 DIAGNOSIS — L81.3 CAFÉ AU LAIT SPOTS: ICD-10-CM

## 2021-03-04 DIAGNOSIS — Q826 OTHER SPECIFIED ANOMALIES OF SKIN: ICD-10-CM

## 2021-03-04 DIAGNOSIS — D22 MELANOCYTIC NEVI: ICD-10-CM

## 2021-03-04 DIAGNOSIS — Q828 OTHER SPECIFIED ANOMALIES OF SKIN: ICD-10-CM

## 2021-03-04 PROBLEM — D22.5 MELANOCYTIC NEVI OF TRUNK: Status: ACTIVE | Noted: 2021-03-04

## 2021-03-04 PROBLEM — D48.5 NEOPLASM OF UNCERTAIN BEHAVIOR OF SKIN: Status: ACTIVE | Noted: 2021-03-04

## 2021-03-04 PROBLEM — L85.8 OTHER SPECIFIED EPIDERMAL THICKENING: Status: ACTIVE | Noted: 2021-03-04

## 2021-03-04 PROBLEM — D22.4 MELANOCYTIC NEVI OF SCALP AND NECK: Status: ACTIVE | Noted: 2021-03-04

## 2021-03-04 PROBLEM — D18.01 HEMANGIOMA OF SKIN AND SUBCUTANEOUS TISSUE: Status: ACTIVE | Noted: 2021-03-04

## 2021-03-04 PROCEDURE — OTHER COUNSELING: OTHER

## 2021-03-04 PROCEDURE — 99203 OFFICE O/P NEW LOW 30 MIN: CPT | Mod: 25

## 2021-03-04 PROCEDURE — 11102 TANGNTL BX SKIN SINGLE LES: CPT

## 2021-03-04 PROCEDURE — OTHER BIOPSY BY SHAVE METHOD: OTHER

## 2021-03-04 PROCEDURE — 88305 TISSUE EXAM BY PATHOLOGIST: CPT

## 2021-03-04 PROCEDURE — OTHER PATHOLOGY BILLING: OTHER

## 2021-03-04 PROCEDURE — 11103 TANGNTL BX SKIN EA SEP/ADDL: CPT

## 2021-03-04 ASSESSMENT — LOCATION SIMPLE DESCRIPTION DERM
LOCATION SIMPLE: RIGHT SHOULDER
LOCATION SIMPLE: LEFT THIGH
LOCATION SIMPLE: ABDOMEN
LOCATION SIMPLE: RIGHT ANTERIOR NECK
LOCATION SIMPLE: LEFT BUTTOCK
LOCATION SIMPLE: LEFT SHOULDER
LOCATION SIMPLE: UPPER BACK

## 2021-03-04 ASSESSMENT — LOCATION DETAILED DESCRIPTION DERM
LOCATION DETAILED: LEFT LATERAL BUTTOCK
LOCATION DETAILED: INFERIOR THORACIC SPINE
LOCATION DETAILED: RIGHT SUPERIOR ANTERIOR NECK
LOCATION DETAILED: LEFT ANTERIOR DISTAL THIGH
LOCATION DETAILED: RIGHT POSTERIOR SHOULDER
LOCATION DETAILED: LEFT POSTERIOR SHOULDER
LOCATION DETAILED: RIGHT RIB CAGE

## 2021-03-04 ASSESSMENT — LOCATION ZONE DERM
LOCATION ZONE: NECK
LOCATION ZONE: LEG
LOCATION ZONE: TRUNK
LOCATION ZONE: ARM

## 2021-03-04 NOTE — PROCEDURE: PATHOLOGY BILLING
Immunohistochemistry ([id number] and [id number]) billing is not performed here. Please use the Immunohistochemistry Stain Billing plan to accomplish this.

## 2021-03-04 NOTE — PROCEDURE: BIOPSY BY SHAVE METHOD
Was A Bandage Applied: Yes
Post-Care Instructions: I reviewed with the patient in detail post-care instructions. Patient is to keep the biopsy site dry overnight, and then apply bacitracin twice daily until healed. Patient may apply hydrogen peroxide soaks to remove any crusting.
Detail Level: Detailed
Hide Additional Anticipated Plan?: No
Dressing: bandage
Size Of Lesion In Cm: 0
Anesthesia Volume In Cc (Will Not Render If 0): 0.3
Curettage Text: The wound bed was treated with curettage after the biopsy was performed.
Electrodesiccation And Curettage Text: The wound bed was treated with electrodesiccation and curettage after the biopsy was performed.
Billing Type: Client Bill
Biopsy Method: Dermablade
Wound Care: Petrolatum
Anesthesia Type: 1% lidocaine with epinephrine
Type Of Destruction Used: Curettage
Notification Instructions: Patient will be notified of biopsy results. However, patient instructed to call the office if not contacted within 2 weeks.
Electrodesiccation Text: The wound bed was treated with electrodesiccation after the biopsy was performed.
Depth Of Biopsy: dermis
Biopsy Type: H and E
Hemostasis: Drysol
Cryotherapy Text: The wound bed was treated with cryotherapy after the biopsy was performed.
Information: Selecting Yes will display possible errors in your note based on the variables you have selected. This validation is only offered as a suggestion for you. PLEASE NOTE THAT THE VALIDATION TEXT WILL BE REMOVED WHEN YOU FINALIZE YOUR NOTE. IF YOU WANT TO FAX A PRELIMINARY NOTE YOU WILL NEED TO TOGGLE THIS TO 'NO' IF YOU DO NOT WANT IT IN YOUR FAXED NOTE.
Silver Nitrate Text: The wound bed was treated with silver nitrate after the biopsy was performed.
Anesthesia Volume In Cc (Will Not Render If 0): 0.5
Consent: Written consent was obtained and risks were reviewed including but not limited to scarring, infection, bleeding, scabbing, incomplete removal, nerve damage and allergy to anesthesia.

## 2021-03-04 NOTE — PROCEDURE: PATHOLOGY BILLING
Immunohistochemistry (56446 and 10590) billing is not performed here. Please use the Immunohistochemistry Stain Billing plan to accomplish this. Immunohistochemistry (71729 and 57795) billing is not performed here. Please use the Immunohistochemistry Stain Billing plan to accomplish this.

## 2021-05-04 ENCOUNTER — APPOINTMENT (OUTPATIENT)
Dept: URBAN - METROPOLITAN AREA CLINIC 260 | Age: 31
Setting detail: DERMATOLOGY
End: 2021-05-05

## 2021-05-04 VITALS — RESPIRATION RATE: 15 BRPM | HEIGHT: 63 IN | WEIGHT: 150 LBS

## 2021-05-04 DIAGNOSIS — D22 MELANOCYTIC NEVI: ICD-10-CM

## 2021-05-04 DIAGNOSIS — L90.5 SCAR CONDITIONS AND FIBROSIS OF SKIN: ICD-10-CM

## 2021-05-04 PROBLEM — D22.72 MELANOCYTIC NEVI OF LEFT LOWER LIMB, INCLUDING HIP: Status: ACTIVE | Noted: 2021-05-04

## 2021-05-04 PROBLEM — D22.71 MELANOCYTIC NEVI OF RIGHT LOWER LIMB, INCLUDING HIP: Status: ACTIVE | Noted: 2021-05-04

## 2021-05-04 PROCEDURE — 99212 OFFICE O/P EST SF 10 MIN: CPT

## 2021-05-04 PROCEDURE — OTHER COUNSELING: OTHER

## 2021-05-04 ASSESSMENT — LOCATION SIMPLE DESCRIPTION DERM
LOCATION SIMPLE: RIGHT PRETIBIAL REGION
LOCATION SIMPLE: LEFT PRETIBIAL REGION
LOCATION SIMPLE: LEFT THIGH

## 2021-05-04 ASSESSMENT — LOCATION DETAILED DESCRIPTION DERM
LOCATION DETAILED: LEFT PROXIMAL PRETIBIAL REGION
LOCATION DETAILED: RIGHT PROXIMAL PRETIBIAL REGION
LOCATION DETAILED: LEFT ANTERIOR DISTAL THIGH

## 2021-05-04 ASSESSMENT — LOCATION ZONE DERM: LOCATION ZONE: LEG

## 2021-05-26 NOTE — PROGRESS NOTES
Patient overall seems to be doing okay.  She has had a lot going on since last time I saw her.  She had a repeat ultrasound which showed polyhydramnios and ended up having a ultrasound at [facility] to further evaluate the polyhydramnios that had persisted since 25 weeks gestation.  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  Baby definitely has a large tongue which is felt will interfere with delivery and could compromise airway at delivery and so it is recommended that patient deliver at [facility].  I did speak with [facility] last week, Dr. Garcia, and [facility] will plan for delivery with their service at [facility].  In the meantime patient can continue to see me for her OB visits.  We discussed all of this today.  She seems to be doing okay with all of it.  She may have this syndrome as well because she was born with an omphalocele.  She was going to talk with her mother further about any discussions that took place when she was born.  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  They offered patient an amniocentesis to evaluate for Kenyatta Weidmann syndrome and patient is still considering it.  She did see Dr. Avila today, 1 of the other [facility] doctors at [facility], and discussed the amniocentesis with her as well.  There was discussion regarding airway issues at the time of birth as well as severe hypoglycemia at the time of birth and that is why they have recommended delivery at [facility].  Patient has been set up to see ENT specialist regarding potential complications at birth as well as to meet with the NICU staff as they be may potentially need to be admitted to the NICU at [facility] depending on how much airway obstruction there  is from the macroglossia.  She also has been set up for a fetal echo given the potential cardiomegaly that was noted.  She definitely needs weekly biophysical profiles as well as a growth ultrasound in about 2 weeks, which will be 3 weeks from her last ultrasound, and this has been set up as well.  She is at increased risk for preeclampsia due to the polyhydramnios but blood pressure today looks excellent and she is showing no other signs of preeclampsia today.  She did meet with a different genetic counselor today at her appointment and they were much more informative about this condition and she is quite pleased that she was able to meet with them.  She is feeling lots of baby movement.  She denies that she is having any bleeding.  She continues to have on and off contractions that are regular.  She will have several contractions in a row and then they do not seem to occur for a long time afterward.  From patient's report it sounds like the polyhydramnios today on ultrasound was stable compared to the last ultrasound.  This is good news.  Overall patient feels like she is doing okay.  She is gotten adequate information.  She is still debating whether or not to have an amniocentesis and will discuss this further with Dr. Avila and Ashish of the genetic counselor.  All of her questions were answered today.  I am hoping that I can continue to be involved with her prenatal care and delivery in the future.  I will talk with [facility] regarding coordination of care.  All of patient's questions were answered today.  I should see her back in 2 weeks for her next OB visit however she will see MFM and several specialist within the next week.  Total time spent with patient discussing the above as well as coordinating care for now this complicated pregnancy was 40 minutes.

## 2021-05-27 ENCOUNTER — COMMUNICATION - HEALTHEAST (OUTPATIENT)
Dept: FAMILY MEDICINE | Facility: CLINIC | Age: 31
End: 2021-05-27

## 2021-05-27 NOTE — PROGRESS NOTES
"Please see \"Imaging\" tab under Chart Review for full report.  This ultrasound was performed in the [facility], and may be located under Care Everywhere.    Isabela Rodriguez MD  Maternal Fetal Medicine    "

## 2021-05-27 NOTE — TELEPHONE ENCOUNTER
Patient had fetal echo done on 3/26/2019.  It was entirely normal.  They have recommended that baby have  EKG as well as echocardiogram within the first month of life.

## 2021-05-27 NOTE — PROGRESS NOTES
Patient comes in today for routine OB visit.  She overall is doing well.  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  ANKUR is actually lower than it had been.  It was up to 31 and now is down to 24.  Plan is to try to get her as far out gestationally as we can.  There was lots of discussion as to whether or not she would have to go to [facility] to deliver and decision was made to deliver at [facility].  [facility] perinatologist will follow patient while she is there although I am hopeful that I will be able to be involved as well.  We did discuss this at length today.  She has seen ENT as well as  in regards to baby's condition.  Baby definitely has an enlarged tongue and there has been polyhydramnios but it seems like things are relatively stable currently.  Her blood pressure today looks fine.  She is feeling lots of baby movement.  She denies that she is having any bleeding.  She may be having occasional contraction but nothing on any regular basis.  All of her questions were answered today.  We will plan to see her again in 2 weeks for her next OB visit.  We did talk about the fact that that is 36 weeks gestation so she will need to get undressed for an exam as well as group B strep testing at that time.  We will see her back in 2 weeks for her next OB visit.

## 2021-05-27 NOTE — TELEPHONE ENCOUNTER
I did speak with Dr. Angel, perinatology at [facility], regarding patient.  Patient herself had a urine calcium excretion test done which was normal.  She needs to have this once a year.  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  She did not have testing for Kenyatta Weidemann herself because they are working with insurance for prior authorization of this testing.  Patient did meet with Dr. Rios,  at the [facility], and that is who has recommended the calcium/creatinine ratio (calcium random urine) testing on a once year basis as noted above.  Patient herself also needs a renal ultrasound sometime within the next couple of months.  I discussed with patient that we will probably get this renal ultrasound after she is no longer pregnant.  She then needs a renal ultrasound and a yearly basis as well.  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    In terms of her pregnancy and the baby having probable Kenyatta Weidmann syndrome as well, per Dr. Rios, the , the biggest concern is in terms of the chance for hyperinsulinemia hypoglycemia in the baby. After baby is born, she will definitely need to have frequent blood sugar checks and should definitely be followed on a diabetic protocol.  It needs to be checked consistently for the first few hours of life.  There is concern that if baby does develop hypoglycemia it can be very difficult to treat and to keep blood sugars normal even with the use of IV glucose.  If baby develops hypoglycemia definitely an insulin level should be drawn as well.  If it is normal that is actually abnormal because it should be high and therefore baby needs to be treated with medication.  If the insulin level is low she also needs to be treated with medication.  The medication to be started is diazoxide which is a medication that can only be given by pediatric  endocrinology.  She may need to be on this medication for weeks to months.  Her blood sugars should normalize and then she would not have any further problems after that.  The other issues that they are concerned about is the enlarged tongue.  Normally babies outgrow this and rarely needs surgery.  There is a chance of breathing issues because of the large tongue but most of the time these breathing issues come more because baby gets a cold and cannot breathe through its nose and has problems breathing through his mouth because of the large tongue.  The issues at  would be more feeding issues and baby may need to have a special nipple or special bottle to feed.  Mom does have an appointment with ENT this coming Monday to discuss the enlarged tongue and the treatment options.  I did talk with mom about whether or not she wanted to see ENT or if she just wanted to wait until after the baby was born to determine if they need to see ENT and she has decided to see ENT this coming Monday for more information regarding all of this.   Mom and I did discuss the need to deliver baby at [facility] given the potential for significant hypoglycemia.  I do not think that it would be appropriate to deliver at either [facility] or [facility] because of the potential for baby to need this medication that can only be given by the pediatric endocrinologist.  Patient will think about this and we can discuss this further at her appointment in a week.  All of her questions were answered today.

## 2021-05-28 ENCOUNTER — OFFICE VISIT - HEALTHEAST (OUTPATIENT)
Dept: FAMILY MEDICINE | Facility: CLINIC | Age: 31
End: 2021-05-28

## 2021-05-28 DIAGNOSIS — T23.261A PARTIAL THICKNESS BURN OF BACK OF RIGHT HAND, INITIAL ENCOUNTER: ICD-10-CM

## 2021-05-28 NOTE — PROGRESS NOTES
Patient overall seems to be doing okay.  She is uncomfortable.  She is continued to have contractions.  She was in labor and delivery a week ago for what she thought was ruptured membranes and tested negative for that.  She was having pretty regular contractions at that point which things slowed down.  Throughout the week she has not had a whole lot of contractions but then yesterday she had another gush of fluid and thought maybe her water broke again.  She was evaluated at [facility] labor and delivery again and testing was negative and she was sent home.  She feels like her contractions are a little bit less frequent now.  She is 2-1/2 cm dilated yesterday.  She is feeling lots of baby movement.  She denies that she is having any bleeding.  She does note that she has a induction planned for 5/11/2019 if she does not go into labor before then.  Cervix today is 3 cm dilated about 80% effaced.  I can  feel a very small bulging bag of water as well.  Discussed plan of care for delivery.  Patient knows who to call and has phone numbers available.  All of her questions were answered.  We will see her back in 1 week for her next OB visit.

## 2021-05-28 NOTE — PROGRESS NOTES
"Please see \"Imaging\" tab under Chart Review for full report.  This ultrasound was performed in the [facility], and may be located under Care Everywhere.    Isabela Rodriguez MD  Maternal Fetal Medicine    "

## 2021-05-28 NOTE — TELEPHONE ENCOUNTER
"Called patient to reschedule her appointment this afternoon due to Dr. Amin being called out on a delivery. Patient then relayed to me that she was debating going to the hospital because she has been \"leaking\" since last night and her contractions currently are 2-3mins apart. I advised her to either call [facility] or go to [facility] ASAP. Which she confirmed she would do.     Denise Sanchez, [facility]    "

## 2021-05-28 NOTE — PROGRESS NOTES
Patient overall seems to be doing okay.  She was hospitalized twice last week at [place] for evaluation for  labor and then for question spontaneous rupture membranes.  From 2019 through 2019 she was hospitalized secondary to presenting with contractions every 2 minutes.  They did several tests and monitored her.  Eventually her contractions spaced out and then she was discharged home on 2019.  She was then reevaluated on 2019 for what she thought was may be ruptured membranes.  She notes that she went to the bathroom and then got up and had a gush of fluid.  All of the testing for rupture membranes was negative.  She has not had any further leaking.  She was diagnosed with bacterial vaginosis and is now on Flagyl.  She also was found to have chlamydia as well as her first  was found to have chlamydia as well.  She is very worried about where the chlamydia could have come from.  Both her and her  tested positive for this.  She tested negative at the beginning of her pregnancy and both her and her  are monogamous with each other and do not have any other sexual partners.  Apparently they told her that this could have been a false positive or could have been around for a long time however with her being negative at the beginning of pregnancy she is quite disturbed by this.  She did take Zithromax last week as did her .  We spent a long time today discussing the fact that we really do not know where this came from but as long as they were both treated at this point now we know that they are both negative and need to monitor carefully.  She notes that since this past weekend her contractions have spaced out completely and she is only having very occasional contractions.  She is having several throughout the day but nothing on any regular basis.  She is feeling lots of baby movement.  She denies that she is having any bleeding.  Plan is still for delivery at the  [facility] due to baby having possible Kenyatta Gaines syndrome.  I did give her my phone number today to get hold of me when and if she needs me for labor.  Labor and delivery at [facility] also has my phone number.  Group B strep testing was done today.  Hemoglobin was done today.  Cervix today is closed thick and posterior.  She is having regular biophysical profiles which have confirmed vertex presentation.  ANKUR this week was 20.  Overall patient feels like things are doing okay.  All of her questions were answered today.  We will see her back in 1 week for her next OB visit.

## 2021-05-28 NOTE — PROGRESS NOTES
"Please see \"Imaging\" tab under Chart Review for full report.  This ultrasound was performed in the [facility], and may be located under Care Everywhere.    Isabela Rodriguez MD  Maternal Fetal Medicine    "

## 2021-05-29 NOTE — TELEPHONE ENCOUNTER
"Patient calling stating \"Tomorrow will be four weeks since I had my baby, yesterday it seemed like the baby stopped, it was almost brown in color, and today bright red.\"    Patient describes bleeding \"it's like my period, i'm not soaking more than one pad in an hour, its hard to gauge though because I never used pads before\"    Denies dizziness/lightheadedness, denies fever or constant abdominal pain, denies any other symptoms    See assessment below    Per protocol due to vaginal bleeding or spotting lasting >4weeks and red blood patient to be seen in the office within 3 days. Patient is agreeable with the plan and has an appointment with her OB tomorrow. Care advice given including reasons to call us back.    Reason for Disposition    [1] Vaginal bleeding or spotting lasts > 4 weeks AND [2] red or red-brown    Answer Assessment - Initial Assessment Questions  1. ONSET: \"Describe your bleeding: is it getting worse, staying the same, improving, or stopping and starting?\"      \"Worse than it has been\"  2. AMOUNT: \"How much bleeding are you having today?\"       - Slight: spotting, or pinkish / brownish mucous discharge; used less than one pad total     - MILD - less than one pad per hour; similar to menstrual bleeding     - MODERATE - blood clots; 1-2 pads/hour     - SEVERE: continuous red blood from vagina; large blood clots (e.g., golf ball size); > 2 pads/hour or not contained by pads      \"I was say its pretty similar to when I have my period\"  3. ABDOMINAL PAIN: \"Do you have any pain?\" \"How bad is the pain?\" \"What does it keep you from doing?\"      - MILD -  doesn't interfere with normal activities, abdomen soft and not tender to touch      - MODERATE - interferes with normal activities or awakens from sleep, tender to touch      - SEVERE - excruciating pain, doubled over, unable to do any normal activities        \"No, I mean I have some cramps but not bad at all\"  4. HORMONES: \"Are you taking any birth control " "medications?\" (e.g., birth control pills, Depo-Provera)      \"No, just the prenatal vitamin and vitamin D\"  5. BLOOD THINNERS: \"Do you take any blood thinners?\" (e.g., coumadin, aspirin)      NO  6. OTHER SYMPTOMS: \"Do you have any other symptoms?\" (e.g., fever, chills, dizziness)      No  7. DELIVERY DATE: \"When was your delivery date?\" \"Vaginal delivery or ?\"      \"She was born on the 3rd of May, vaginal\"  8. BREASTFEEDING: \"Are you breastfeeding?\"      Yes    Protocols used: POSTPARTUM - VAGINAL BLEEDING AND LOCHIA-A-AH      "

## 2021-05-29 NOTE — PROGRESS NOTES
PROGRESS NOTE   5/31/2019    SUBJECTIVE:  Jenny Santiago is a 28 y.o. female  who presents for   Chief Complaint   Patient presents with     Vaginal Bleeding     Thinks that her postpartum bleeding stopped on Wednesday but then seemed to start again yesterday and this time was bright red and heavy. Still going on today.      Patient comes in today because of increased vaginal bleeding.  She gave birth on 5/3/2019 at [facility].  Her vaginal birth was uncomplicated.  She had normal bleeding that was heavy-cory for about 1 to 2 weeks and then she had brown spotting for maybe a week or so.  In the last week she has not had a whole lot of bleeding at all and then all of a sudden on Thursday started having much more bleeding.  She notes that on Wednesday night she did go for a walk which she had not done in the past since giving birth and she is wondering if perhaps that caused her to have more bleeding.  She comes in today because of the fact that the bleeding is there and it had been gone in the past.  She did have a little bit of cramping on Thursday night with the bleeding but today the cramping is much improved.  She denies that she is passed any clots at all is been primarily red blood that she is seen.  She has had to change her pad may be once every 3-4 hours.  She certainly has not had excessive bleeding.  Is not any fevers or any other signs of infection.  She does note that she has been less active yesterday and today due to the bleeding and has noticed a significant decrease in the amount of bleeding since it started.  Patient continues to breast-feed her infant and this is going well.  Baby is breast-feeding exclusively at this point.    Patient Active Problem List   Diagnosis     Supervision of normal pregnancy     Supervision of high risk pregnancy in third trimester     Polyhydramnios in third trimester complication, single or unspecified fetus     Macrosomia of fetus affecting  "management of mother in third trimester, single or unspecified fetus     Fetal abnormality affecting management of mother, single or unspecified fetus       Current Outpatient Medications   Medication Sig Dispense Refill     cholecalciferol, vitamin D3, (VITAMIN D3) 5,000 unit Tab Take 1 tablet by mouth Daily at 8:00 am..       prenatal no115-iron-folic acid 29 mg iron- 1 mg Chew Chew daily.       No current facility-administered medications for this visit.        Allergies   Allergen Reactions     Penicillins      Twin sister is allergic to it, son is allergic to it, she has never had it.        Past Medical History:   Diagnosis Date     Kenyatta syndrome     possibly, working on getting testing, needs urine calcium excretion monitoring (calcium/creatinine ratio) and renal ultrasound on yearly basis     Blood type O+      Cystic fibrosis screening     Patient tested negative for CF with first pregnancy     Gastroschisis, congenital     Repaired at birth, omphalocele at birth     Normal delivery     x1     Normal delivery 05/03/2019     Prematurity, 1,250-1,499 grams, 27-28 completed weeks     Born 3 months early and weighed approximately 3 pounds       Past Surgical History:   Procedure Laterality Date     OTHER SURGICAL HISTORY      Repair of ?gastroschisis vs omphalocele at birth     ParaGard      12/26/2012     TONSILLECTOMY  in high school       Social History     Tobacco Use   Smoking Status Never Smoker   Smokeless Tobacco Never Used       OBJECTIVE:     /72   Pulse 80   Temp 98.5  F (36.9  C) (Oral)   Ht 5' 3\" (1.6 m)   Wt 175 lb (79.4 kg)   LMP 08/09/2018   SpO2 97%   BMI 31.00 kg/m      Physical Exam:  GENERAL APPEARANCE: A&A, NAD, well hydrated, well nourished  SKIN:  Normal skin turgor, no lesions/rashes   CV: RRR, no M/G/R   LUNGS: CTAB  ABDOMEN: S&NT, no masses or organomegaly, positive bowel sounds.  No CVA tenderness is appreciated.  No tenderness is appreciated over the bladder or " uterine pelvic area.  Pelvic exam was done.  External genitalia appears normal and well-healed.  Bimanual exam revealed the uterus to be 12 weeks size.  Very minimal vaginal discharge and bleeding was noted with the bimanual exam.  No tenderness was appreciated with the bimanual exam.  No masses were appreciated.  EXTREMITY: no swelling noted.  Full range of motion of all 4 extremities.   NEURO: no gross deficits   PSYCHIATRIC;  Mood appropriate, memory intact        ASSESSMENT/PLAN:     Vaginal bleeding [N93.9]      1. Vaginal bleeding    Patient overall seems to be doing okay.  I suspect that the increased bleeding that she had yesterday and today is due to the fact that she went for a long walk on Wednesday night and probably did more than she had been doing.  I explained this to the patient.  At this point she is comfortable that things are doing okay.  I am less concerned because she is only having to change her pad every 3-4 hours and she is not being cramping and no clotting or excessive bleeding is associated with the symptoms.  We discussed the fact that it is normal for her to bleed for between 4 and 6 weeks after having a baby and I think that the increased activity on Wednesday night probably triggered some additional bleeding.  Her bleeding is decreasing from yesterday significantly and I suspect that we will continue to do that.  She will try to be a little bit more careful about what she is doing but was encouraged to be active nonetheless.  There certainly is no sign of infection and we discussed the fact that infection is the other thing that can cause this but there is no sign that there is endometritis going on or other infection as she does not have a fever and has no pain anywhere.  Patient was quite reassured by these findings.  She will continue to monitor her symptoms carefully and if she has additional questions or concerns will certainly let me know.  All of her questions were answered  today.  We will see her back in about a month for her postpartum check.  Jessica Amin MD

## 2021-05-30 NOTE — PROGRESS NOTES
Postpartum Visit  Date of Visit: 7/2/2019    Jenny Santiago is a 28 y.o. year old female here for a postpartum check. She is doing well and seems to be back to her normal self. She is 8 weeks postpartum following a spontaneous vaginal delivery. I have fully reviewed the prenatal and intrapartum course. The delivery was at 38w1d gestation.  Delivery was done at [facility] due to concern about baby well-being at birth.  Outcome: spontaneous vaginal delivery. Anesthesia: none.  Postpartum course has been good. Baby's course has been good. Baby is feeding by breast.  She bled for 7 weeks after delivery and is not currently bleeding.  Patient notes that she bled for 2 weeks like a normal menstrual cycle and then had spotting but then passed a large tissue on 6/13/2019.  Since she is past the tissue which she did take a picture of and bring it in and appears to be placental tissue her bleeding has significantly decreased and she is not currently bleeding.  Bowel function is normal. Bladder function is normal. She and her partner plan to use condoms for contraception. She has not gotten a period since birth of baby. They have not resumed intercourse since birth of baby. Postpartum depression screening: negative.    She notes that she had heavier bleeding than she remembered after her other baby.  She did come in for bleeding in the first couple of weeks after baby was born but then the bleeding seemed to decrease.  She notes that the bleeding was always a little bit heavier than it had been with her first baby but she really think that much of it.  That on 6/13/2019 she passed what appears to be a clump of placental/membrane tissue and since she passed that her bleeding has decreased significantly.  She now has not been bleeding probably for about the last 2 weeks or so.  She does not think that she had any kind of menstrual cycle.  She does feel like she is back to herself both physically and  emotionally.  We did discuss birth control.  She did have an IUD before but she had it removed secondary to very heavy bleeding.  She had 2 weeks straight of very heavy bleeding.  She had a ParaGard and she is not sure if using an IUD that had hormones and it would be more effective for her.  She then switched to the NuvaRing and her legs got tremendously swollen and her toes went numb whenever she would walk and so she really does not want the IUD or NuvaRing again.  When she was on birth control pills she spotted at different times and so she is not certain she wants to start birth control pills again or not.  We discussed at length today that she is breast-feeding and so her choices for contraception are fairly limited.  After fairly lengthy discussion of the different options that are available including birth control pills Nexplanon and an IUD patient has elected to use condoms until after she is done breast-feeding.  About every 2 months and so she stops breast-feeding and desires a different method of birth control we certainly can talk about that at that time.  Patient was tested and tested positive for chlamydia in April 2019.  We will recheck chlamydia test today.      Current Outpatient Medications:      cholecalciferol, vitamin D3, (VITAMIN D3) 5,000 unit Tab, Take 1 tablet by mouth Daily at 8:00 am.., Disp: , Rfl:      prenatal no115-iron-folic acid 29 mg iron- 1 mg Chew, Chew daily., Disp: , Rfl:     Allergies   Allergen Reactions     Penicillins      Twin sister is allergic to it, son is allergic to it, she has never had it.        Antepartum Complications: There was concern about baby having Kenyatta Rosalba syndrome as baby was macrosomic and had a large tongue.  There was concern about complications once baby was here and therefore baby was delivered at [facility].  Patient did have polyhydramnios throughout the pregnancy as well.  SHAVONNE: Estimated Date of Delivery: 5/16/19  Actual Date of  Delivery: 5/3/2019  Type of delivery: Spontaneous vaginal  Episiotomy/laceration: Yes - a couple stitches  Complications: None    Baby Tara is doing well. She is breast feeding and this is going well. She eats 10 mins per side  ounces every 2 hours. She seems to be thriving and is gaining weight well.     PATIENT  Mood: well  Appetite: ok  Energy: within normal limits   Calcium intake: adequate  Bowel movements: 2  bowel movement(s) per day      Past Medical History:   Diagnosis Date     Kenyatta syndrome     possibly, working on getting testing, needs urine calcium excretion monitoring (calcium/creatinine ratio) and renal ultrasound on yearly basis     Blood type O+      Cystic fibrosis screening     Patient tested negative for CF with first pregnancy     Gastroschisis, congenital     Repaired at birth, omphalocele at birth     Normal delivery     x1     Normal delivery 05/03/2019     Prematurity, 1,250-1,499 grams, 27-28 completed weeks     Born 3 months early and weighed approximately 3 pounds       Past Surgical History:   Procedure Laterality Date     OTHER SURGICAL HISTORY      Repair of ?gastroschisis vs omphalocele at birth     ParaGard      12/26/2012     TONSILLECTOMY  in high school       Social History     Socioeconomic History     Marital status:      Spouse name: Omi Santiago     Number of children: 2     Years of education: Not on file     Highest education level: Not on file   Occupational History     Occupation: dental assistant   Social Needs     Financial resource strain: Not on file     Food insecurity:     Worry: Not on file     Inability: Not on file     Transportation needs:     Medical: Not on file     Non-medical: Not on file   Tobacco Use     Smoking status: Never Smoker     Smokeless tobacco: Never Used   Substance and Sexual Activity     Alcohol use: No     Comment: occ prior to pregnancy     Drug use: No     Sexual activity: Yes     Partners: Male     Comment: pregnancy    Lifestyle     Physical activity:     Days per week: Not on file     Minutes per session: Not on file     Stress: Not on file   Relationships     Social connections:     Talks on phone: Not on file     Gets together: Not on file     Attends Christian service: Not on file     Active member of club or organization: Not on file     Attends meetings of clubs or organizations: Not on file     Relationship status: Not on file     Intimate partner violence:     Fear of current or ex partner: Not on file     Emotionally abused: Not on file     Physically abused: Not on file     Forced sexual activity: Not on file   Other Topics Concern     Not on file   Social History Narrative     Not on file       Immunization History   Administered Date(s) Administered     DT (pediatric) 07/31/2002     DTaP, historic 02/12/1990, 04/02/1990, 05/14/1990, 06/07/1991, 08/30/1995     HPV Quadrivalent 03/30/2007, 10/18/2007, 12/18/2007     Hep A, historic 03/29/2010, 09/28/2010     Hep B, historic 07/31/2002, 11/13/2002, 11/12/2003     Influenza, Seasonal, Inj PF IIV3 10/04/2016, 10/04/2017     Influenza, inj, historic,unspecified 10/18/2007, 10/02/2018, 10/02/2018, 10/02/2018     Influenza,live, Nasal Laiv4 09/24/2013, 10/22/2014     MMR 06/07/1991, 07/31/2002, 10/12/2012     OPV,Trivalent,Historic(0041-0729 only) 04/02/1990, 05/14/1990, 06/07/1991, 08/30/1995     Td,adult,historic,unspecified 07/31/2002     Tdap 05/25/2012, 02/20/2019       Family History   Problem Relation Age of Onset     Thyroid disease Mother      Bipolar disorder Mother      Other Father         cystic fibrosis carrier     Other Sister         fraternal twin     Bipolar disorder Brother      Heart disease Paternal Grandmother         valve replaced     Lung cancer Paternal Grandfather         smoker     Colon cancer Paternal Grandfather      No Medical Problems Brother      No Medical Problems Sister      No Medical Problems Sister      No Medical Problems Sister   "      OB History    Para Term  AB Living   2 2 1 1 0 2   SAB TAB Ectopic Multiple Live Births   0 0 0 0 2      # Outcome Date GA Lbr Jonah/2nd Weight Sex Delivery Anes PTL Lv   2 Term 19 38w1d  7 lb 6.2 oz (3.35 kg) F Vag-Spont None N ELI      Birth Comments: neg GBS, fast labor, arrived at 9 cm, pushed for 1 hour, no tears, baby to NICU due to concerns with Kenyatta Weidemann syndrome, tight fit but no vacuum,    1  10/10/12 36w4d  6 lb 10 oz (3.005 kg) M Vag-Vacuum EPI N ELI      Birth Comments: neg GBS, admit with SROM, spont contractions, ? if 36 vs 40 wks, pit x 4 hrs then spont labor, push 2 hrs, vacuum X 1 pull for mat exhaustion, fht's 90's, ?spinal anes due to dense block seen, 1 degree lac, 14\" head       ROS: Patient denies problems with nausea, vomiting, diarrhea, constipation, black tarry stools or blood in her stools. Denies chest pain, shortness of breath, problems with urination or problems with intercourse. Remainder of review of systems is negative.     Patient's last menstrual period was 2018.    OBJECTIVE:    /70   Pulse (!) 101   Wt 174 lb (78.9 kg)   LMP 2018   SpO2 99%   BMI 30.82 kg/m      PHYSICAL EXAM:  Well developed, well nourished, no acute distress.  HEENT: normocephalic/atraumatic, PERRLA/EOMI, TMs: Gray, normal light reflex, no nasal discharge.  Oral mucosa: no erythema/exudate  Neck: No LAD/masses/thyromegaly/bruits  Lungs: clear bilaterally  Heart: regular rate and rhythm, no murmurs/gallops/rubs  Breasts: symmetric, no masses/skin changes, nipple discharge, or axillary LAD.  BSE reviewed.  Abdomen: Normal bowel sounds, soft, non-tender, non-distended, no masses, neg Mohan's/McBurney's, no rebound/guarding. Abdominal muscles well opposed.  Genital: Normal external genitalia, no discharge, no lesions, cervix is non-friable, os is closed, no CMT, no adnexal tenderness or fullness.  Uterus is firm, nontender, minimally enlarged, perineum " intact.  Thin prep done.  GC chlamydia probe obtained.  Rectal: internal exam deferred.  External exam is normal.  Lymphatics: no supraclavicular/axillary/epitrochlear/inguinal LAD. No edema.  Neuro: A&O x 3, CN II-XII intact, strength 5/5, reflexes symmetric, sensory intact to light touch.  Psych: Behavior appropriate, engaging.  Thought processes congruent, non-tangential.  Musculoskeletal: no gross deformities.  Skin: no rashes or lesions.    Recent Results (from the past 240 hour(s))   Hemoglobin   Result Value Ref Range    Hemoglobin 14.0 12.0 - 16.0 g/dL   Urinalysis Macroscopic   Result Value Ref Range    Color, UA Yellow Colorless, Yellow, Straw, Light Yellow    Clarity, UA Clear Clear    Glucose, UA Negative Negative    Bilirubin, UA Negative Negative    Ketones, UA Negative Negative    Specific Gravity, UA 1.015 1.005 - 1.030    Blood, UA Negative Negative    pH, UA 5.0 5.0 - 8.0    Protein, UA Negative Negative mg/dL    Urobilinogen, UA 0.2 E.U./dL 0.2 E.U./dL, 1.0 E.U./dL    Nitrite, UA Negative Negative    Leukocytes, UA Moderate (!) Negative   Urine,Microscopic   Result Value Ref Range    Bacteria, UA Few (!) None Seen hpf    RBC, UA None Seen None Seen, 0-2 hpf    WBC, UA 5-10 (!) None Seen, 0-5 hpf    Squam Epithel, UA 10-25 (!) None Seen, 0-5 lpf    Mucus, UA Few (!) None Seen lpf   Culture, Urine   Result Value Ref Range    Culture No Growth    Chlamydia trachomatis & Neisseria gonorrhoeae, Amplified Detection   Result Value Ref Range    Chlamydia trachomatis, Amplified Detection Negative Negative    Neisseria gonorrhoeae, Amplified Detection Negative Negative   Gynecologic Cytology (PAP Smear)   Result Value Ref Range    Case Report       Gynecologic Cytology Report                       Case: [id number]                                   Authorizing Provider:  Jessica Amin MD    Collected:           07/02/2019 1539              Ordering Location:     [facility]       Received:             07/02/2019 1539                                     Family Medicine/OB                                                           First Screen:          Marilu Amaral S, CT                                                                            (ASCP)                                                                       Specimen:    SUREPATH PAP, SCREENING, Endocervical/cervical                                             Interpretation  Negative for squamous intraepithelial lesion or malignancy.      Negative for squamous intraepithelial lesion or malignancy    Result Flag Normal Normal    Specimen Adequacy       Satisfactory for evaluation, endocervical/transformation zone component present    HPV Reflex? Yes if ASCUS     HIGH RISK No     LMP/Menopause Date Unknown     Abnormal Bleeding No     Pt Status Postpartum     Birth Control/Hormones None     Previous Normal/Date 11/2/2018     Prev Abn Date/Dx None     Cervical Appearance Normal        ASSESSMENT/PLAN:      Healthy 28 y.o. old female here for 8 week postpartum exam, doing well.  Normal Post Post Partum Exam  No evidence of post partum depression  Contraception: Patient I discussed multiple methods of birth control today.  She has had an IUD in the past, ParaGard, and had significant heavy menstrual bleeding and had it removed secondary to that.  She also has used NuvaRing and had significant swelling of her legs and numbness of her toes whenever she would walk.  She also has been on birth control pills in the past and had spotting that was annoying to her.  At this point she is going to try condoms especially given the fact that she is still breast-feeding.  She plans to breast-feed for many months yet.  She will use condoms for now and if she changes her mind or if she stops breast-feeding she certainly should let me know.  I would be happy to give her a prescription for a different type of birth control as she is no longer  breast-feeding.  Patient did test positive for chlamydia in April 2019.  We will recheck GC chlamydia today.  Preventative services reviewed today.  Return for physical exam in 1 year.    She will call with increasing problems or concerns. Will contact her with results of labs when available.   Jessica Amin MD

## 2021-05-30 NOTE — TELEPHONE ENCOUNTER
----- Message from Jessica Amin MD sent at 7/3/2019  1:57 PM CDT -----  The results of the urine studies were entirely normal. No sign of any infection.

## 2021-05-30 NOTE — TELEPHONE ENCOUNTER
Spoke with patient, informed her of lab results. Confirmed understanding.     Denise Sanchez, CMA

## 2021-05-31 VITALS — BODY MASS INDEX: 28.26 KG/M2 | WEIGHT: 159.5 LBS | HEIGHT: 63 IN

## 2021-06-02 VITALS — WEIGHT: 199 LBS | BODY MASS INDEX: 35.25 KG/M2

## 2021-06-02 VITALS — HEIGHT: 63 IN | BODY MASS INDEX: 33.31 KG/M2 | WEIGHT: 188 LBS

## 2021-06-02 VITALS — BODY MASS INDEX: 35.07 KG/M2 | WEIGHT: 198 LBS

## 2021-06-02 VITALS — BODY MASS INDEX: 30.91 KG/M2 | WEIGHT: 174.5 LBS

## 2021-06-02 VITALS — WEIGHT: 190 LBS | BODY MASS INDEX: 33.66 KG/M2

## 2021-06-02 VITALS — BODY MASS INDEX: 28.17 KG/M2 | WEIGHT: 159 LBS

## 2021-06-02 VITALS — WEIGHT: 164 LBS | BODY MASS INDEX: 29.05 KG/M2

## 2021-06-02 VITALS — WEIGHT: 197 LBS | BODY MASS INDEX: 34.9 KG/M2

## 2021-06-02 VITALS — BODY MASS INDEX: 31.53 KG/M2 | WEIGHT: 178 LBS

## 2021-06-02 VITALS — BODY MASS INDEX: 34.74 KG/M2 | WEIGHT: 200 LBS

## 2021-06-02 VITALS — WEIGHT: 201 LBS | BODY MASS INDEX: 34.91 KG/M2

## 2021-06-03 VITALS — WEIGHT: 174 LBS | BODY MASS INDEX: 30.82 KG/M2

## 2021-06-03 VITALS — WEIGHT: 175 LBS | HEIGHT: 63 IN | BODY MASS INDEX: 31.01 KG/M2

## 2021-06-03 VITALS — WEIGHT: 203 LBS | BODY MASS INDEX: 35.96 KG/M2

## 2021-06-04 ENCOUNTER — OFFICE VISIT - HEALTHEAST (OUTPATIENT)
Dept: FAMILY MEDICINE | Facility: CLINIC | Age: 31
End: 2021-06-04

## 2021-06-04 DIAGNOSIS — T30.0 BURN: ICD-10-CM

## 2021-06-04 DIAGNOSIS — Z02.6 ENCOUNTER RELATED TO WORKER'S COMPENSATION CLAIM: ICD-10-CM

## 2021-06-04 ASSESSMENT — MIFFLIN-ST. JEOR: SCORE: 1398.09

## 2021-06-13 NOTE — PROGRESS NOTES
DATE OF SERVICE: 10/02/2017    SUBJECTIVE:  Very pleasant 26-year-old female who presents with left-sided chest  pain for approximately 1 week in duration.  The pain is located in the left chest  and is associated with taking deep breaths.  She has been doing workouts in the gym  including working with weights, although she actually does the workouts the pain is  not present.  She has never had this pain in the past.      REVIEW OF SYSTEMS:  Negative for palpitations, shortness of breath, syncope.    SOCIAL HISTORY:  She does not smoke.  She works as a dental assistant.    OBJECTIVE:  VITAL SIGNS:  Temperature is afebrile.  Pulse 72, respirations 12, blood pressure is  98/56.  HEENT:  Normal  NECK:  Supple.  LUNGS:  Clear to auscultation with no adventitial sounds.  HEART:  Regular rhythm, normal S1, normal, S2.      With Charlotte in the room, the left breast and chest was palpated showing no evidence  of pain to palpation.  No evidence of rib pain with palpation or compression.      Review of the chest x-ray shows bony architecture intact.  Costophrenic angles are  sharp.  There is no focal infiltrates.  The cardiac silhouette is within normal  limits.    ASSESSMENT:  Musculoskeletal chest pain.    PLAN:  1.  Reassurance.  2.  Return if not improving.      MD barrie KAUFFMAN 10/02/2017 09:48:18  T 10/02/2017 10:25:53  R 10/02/2017 10:25:53  [id number]        cc:MISA KENDALL MD

## 2021-06-20 NOTE — LETTER
Letter by Jessica Amin MD at      Author: Jessica Amin MD Service: -- Author Type: --    Filed:  Encounter Date: 7/8/2020 Status: (Other)         07/08/20      To Whom it May Concern:    Jenny Santiago was tested for COVID19 today and the results are still pending. She will need to self quarantine until after the test results return.    Please do not hesitate to contact me if you have any questions or concerns.      Sincerely,      Jessica Amin M.D.

## 2021-06-20 NOTE — LETTER
Letter by Keri Reddy RN at      Author: Keri Reddy RN Service: -- Author Type: --    Filed:  Encounter Date: 7/12/2020 Status: (Other)       7/12/2020        Jenny Santiago  [address] [place] [address]    This letter provides a written record that you were tested for COVID-19 on 7/8/20.     Your result was negative. This means that we didnt find the virus that causes COVID-19 in your sample. A test may show negative when you do actually have the virus. This can happen when the virus is in the early stages of infection, before you feel illness symptoms.    If you have symptoms   Stay home and away from others (self-isolate) until you meet ALL of the guidelines below:    Youve had no fever--and no medicine that reduces fever--for 3 full days (72 hours). And ?    Your other symptoms have gotten better. For example, your cough or breathing has improved. And?    At least 10 days have passed since your symptoms started.    During this time:    Stay home. Dont go to work, school or anywhere else.     Stay in your own room, including for meals. Use your own bathroom if you can.    Stay away from others in your home. No hugging, kissing or shaking hands. No visitors.    Clean high touch surfaces often (doorknobs, counters, handles, etc.). Use a household cleaning spray or wipes. You can find a full list on the EPA website at www.epa.gov/pesticide-registration/list-n-disinfectants-use-against-sars-cov-2.    Cover your mouth and nose with a mask, tissue or washcloth to avoid spreading germs.    Wash your hands and face often with soap and water.    Going back to work  Check with your employer for any guidelines to follow for going back to work.    Employers: This document serves as formal notice that your employee tested negative for COVID-19, as of the testing date shown above.

## 2021-06-20 NOTE — LETTER
Letter by Arely Haynes at      Author: Arely Haynes Service: -- Author Type: --    Filed:  Encounter Date: 7/16/2020 Status: (Other)         July 16, 2020       Jenny Santiago  [address]  [place] [address]    Dear Jenny Santiago:    We are pleased to provide you with secure, online access to medical information for you and your family within [facility]. Per your request, we have expanded your account to allow access to the records of the following family members:              Damián ALEX Santiago (privilege ends on 10/9/2024.)            Tara CISNEROS Pamela (privilege ends on 5/2/2031.)     How Do I Log In?  1. In your Internet browser, go to [email or web address]  2. Log into [employer] [facility] using your [employer] Username and Password.  3. Click Sign In.        How Do I Access a Family Member's Account?  4. Select the account you want to access by clicking the [place] with the appropriate patient's name at the top of your screen.   5. You will see a disclaimer page letting you know that you will be viewing a family member's record. Review the disclaimer and then click Accept Proxy Access Disclaimer to proceed.  6. Once you switch to viewing a family member's record, you can navigate to [employer] pages the same way you would for yourself. You can return to your own account by clicking the [place] at the top of the screen with your name on it.    7. To customize the colors and names of the linked accounts, you can select Personalize from the Profile dropdown menu at the top of the screen, then click the Edit button to make changes.     Additional Information  If you have questions, visit [email or web address], e-mail [email or web address] or call [phone] to talk to our [facility] staff. Remember, [employer] [facility] is NOT to be used for urgent needs. For medical emergencies, dial 911.

## 2021-06-20 NOTE — PROGRESS NOTES
SUBJECTIVE:   Date of visit: 2018    Jenny Wagner is a 27 y.o.  presents c/o missed period, nausea, breast tenderness for past 2 weeks. Patient's last menstrual period was 2018. (sure, normal, came at expected time). Her periods are regular. Home UPT's on 9/9/18 x2 were positive, so she believes she is pregnant. She is happy about this.  She had been using NuvaRing as her method of birth control but did not put another one in after her period on 2018.  They subsequently were open to pregnancy but were not expecting it to happen this quickly.  Her menstrual cycle was a normal menstrual cycle and it came at the correct time.  She notes that before she went on NuvaRing she had cycles that came about every 28 days but they only lasted about 3 or 4 days.  She has started prenatal vitamins.  No FM yet. No bleeding or cramping.  She does not have any exposure to cat litter.  She is aware to avoid using hot tubs or jacuzzi's for which the temperature cannot be adjusted.  She has no other concerns.  She did have a vacuum extraction with her first baby who weighed 6 lbs. 10 oz.  She had spontaneous rupture membranes at 36-1/2 weeks gestation with that pregnancy.  Vacuum was done for maternal exhaustion.  She did have a very very dense epidural to the point that they thought that she probably got a spinal rather than an epidural and therefore she is hoping not to need an epidural with this pregnancy.  She does have a history of abdominal surgery immediately after birth for a questionable gastroschisis versus omphalocele.  She has been feeling breast tenderness for about the last 2 weeks even before her menstrual cycle was due as well as some low back pain which is typical of her menses coming.  She had some very light spotting from  to 18 but has not had any bleeding since then.  Her significant other is a sickle cell carrier.  Patient has been tested for sickle cell as well as cystic  "fibrosis with her previous pregnancy and was not a carrier of either.      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     Blood type O+      Cystic fibrosis screening     Patient tested negative for CF with first pregnancy     Gastroschisis, congenital     Repaired at birth     Normal delivery     x1     Prematurity, 1,250-1,499 grams, 27-28 completed weeks     Born 3 months early and weighed approximately 3 pounds     Social History     Social History     Marital status:      Spouse name: Omi Santiago     Number of children: 1     Years of education: N/A     Occupational History     dental assistant      Social History Main Topics     Smoking status: Never Smoker     Smokeless tobacco: Never Used     Alcohol use No      Comment: occ prior to pregnancy     Drug use: No     Sexual activity: Yes     Partners: Male      Comment: pregnancy     Other Topics Concern     Not on file     Social History Narrative     OB History    Para Term  AB Living   2 1 0 1 0 1   SAB TAB Ectopic Multiple Live Births   0 0 0 0 1      # Outcome Date GA Lbr Jonah/2nd Weight Sex Delivery Anes PTL Lv   2 Current            1  10/10/12 36w4d  6 lb 10 oz (3.005 kg) M Vag-Vacuum EPI N ELI      Birth Comments: neg GBS, admit with SROM, spont contractions, ? if 36 vs 40 wks, pit x 4 hrs then spont labor, push 2 hrs, vacuum X 1 pull for mat exhaustion, fht's 90's, ?spinal anes due to dense block seen, 1 degree lac, 14\" head          OBJECTIVE:   /62 (Patient Site: Left Arm, Patient Position: Sitting, Cuff Size: Adult Regular)  Pulse 75  Wt 159 lb (72.1 kg)  LMP 2018  SpO2 99%  Breastfeeding? No  BMI 28.17 kg/m2  NAD, A&Ox3. Normal affect. No respiratory distress. VS normal (see above).    REVIEW OF SYSTEMS:  General:  " Denies fever, chills, HA, fatigue, myalgias, weight change    Eyes: Denies vision changes   Ears/Nose/Throat: Denies nasal congestion, rhinorrhea, ear pain or discharge, sore throat, swollen glands  Cardiovascular: Denies CP, palpitations  Respiratory: Denies SOB, cough  Gastrointestinal:  Denies changes in bowel habits, melena, rectal bleeding,  Genitourinary: Denies changes in urine habits/frequency/dysuria, hematuria   Musculoskeletal:  Denies  joint pain or swelling or erythema, edema  Skin: Denies rashes   Neurologic: Denies weakness, paresthesia  Psychiatric: Denies mood changes   Endocrine: Denies polyuria, polydipsia, polyphagia  Heme/Lymphatic: Denies problem with bleeding   Allergic/Immunologic: Denies problem       Recent Results (from the past 240 hour(s))   Pregnancy (Beta-hCG, Qual), Urine   Result Value Ref Range    Pregnancy Test, Urine Positive (!) Negative        ASSESSMENT/PLAN:     Supervision of normal pregnancy [Z34.90]    1. Supervision of normal pregnancy  -  OB < 14 Weeks With Transvaginal; Future    2. Amenorrhea  - Pregnancy (Beta-hCG, Qual), Urine        1) Amenorrhea: secondary to pregnancy - see #2 below.     2) PRENATAL:  at 4w6d by LMP, giving Estimated Date of Delivery: 19. Declined 1st trimester genetic screening. Education: discussed usual 1st OB education; reviewed prenatal folder, info given on [facility] website. Avoid alcohol and tobacco; minimize caffeine to <2 servings per day; advised regular physical activity; avoid abdominal trauma;  drink >/= 8 cups water/fluids daily; continue PNV.  Discussed morning sickness and the importance of frequent small meals in order to decrease nausea.  If she gets to the point that she is unable to keep food/liquids down, she should contact me as we can provide her with medications to help.  She did not have much sickness with her first pregnancy and she is hoping that that will be the same again.  Patient given information on  pregnancy today.  Next visit 7 weeks.  Will get an ultrasound to establish EDC.  Order placed today.  She will call and make her own appointment for the ultrasound.  If she has difficulties making that appointment will certainly let me know.  All questions answered.  Total time spent with patient was at least 25 minutes, all of which was spent in counselling and coordination of care regarding her pregnancy.   Jessica Amin MD

## 2021-06-21 NOTE — PROGRESS NOTES
Infection History   - Live with someone with TB or exposed to TB No, but patient does need mantoux testing for her job. Given note today saying would not suggest mantoux testing during pregnancy.   - Patient reported with history of genital herpes No  - Rash or viral illness since LMP   No  - Prior GBS infected child    No  - Hepatitis B or C     No  - Gonorrhea      No  - Chlamydia      No  - HPV       No  - HIV       No  - Syphilis      No  - Other STI's      No  - Other (see comments)    No      Genetic Screening/Teratology Counseling  - Patient's age 35 years or older as of estimated date of delivery  No  - Thalassemia    (Italian, Greek, Mediterranean, or  background) No  - MCV less than 80       No   - Neural tube defects    (meningomyelocele, spina bifida, or anencephaly)  No  - Congenital heart defect      No  - Down syndrome       No  - Adam-Sachs (Ashkenazi Moravian, Cajun, German Gladwin)   No  - Canavan Disease (Ashkenazi Moravian)    No  - Familial Dysautonomia (Ashkenazi Moravian)    No  - Sickle cell disease or trait ()     No  - Hemophilia or other blood disorders    No  - Muscular dystrophy       No  - Cystic fibrosis       No  - Cass[place]'s chorea       No  - Mental retardation/autism       No   (If yes, was the patient tested for fragile X?)     - Other inherited genetic or chromosomal disorders   No  - Maternal metabolic disorders (type 1 diabetes, PKU)  No  - Patient or baby's father had a child with birth defects   No  - Recurrent pregnancy loss, or stillbirth    No  - Medications   (including supplements, vitamins, herbs, OTC drugs)   /illicit/recreational drugs/alcohol since last LMP    list agents and strength, dosing    No  Any other (see comments)      No, FOB has sickle cell trait, patient's father is carrier for CF however patient is not.       Discussed in today's office visit  HIV and other routine prenatal tests     Discussed, will get hepatitis B, HIV, syphilis testing today.   We will also obtain chlamydia and gonorrhea testing today.  Risk Factors Identified by Prenatal history   Pt had gastroschesis/omphalocele at birth  Anticipated course of prenatal care    Discussed, need to see me every 4 weeks until 24 weeks gestation then every 2 weeks until 36 weeks gestation and then every week until delivery was discussed.  Nutrition and weight gain counseling: special diet   Discussed  Toxoplasmosis precautions (Cats/Raw meat)  Discussed, no exposure to cat litter.  Sexual activity       Discussed  Exercise       Discussed  Influenza vaccine      Discussed,already had a work this season  Smoking counseling      Non smoker  Environmental/work hazards     None identifed  Travel        Discussed  Tobacco (asked, advise, assess, assist and arrange) No tobacco use  Alcohol       No alcohol  Illicit/recreational drugs     Patient denies  Use of any meds   (including supplements, vitamins, herbs, OTC drugs) Discussed  Indications for ultrasound     Discussed, will plan to get level II ultrasound at 20 weeks gestation to check fetal anatomy.  Domestic violence      Patient denies  Seat Belt Use        Discussed  Childbirth classes/hospital facilities    Discussed, plans to [facility] at [facility].  Dental care       Discussed  Avoidance of saunas or hot tubs    Discussed  Teratogens        Discussed  Breast-feeding       Discussed, would like to attempt breast-feeding. Breast fed her son without problems.   Screening for aneuploidy     Discussed, discussed at length today, they will discuss further and if interested will let me know.       Patient comes in today with her  for first OB appointment.  She is about 12 weeks 1 day gestation today.  She overall is feeling okay.  She never really got incredibly sick which she is quite thankful for.  She has not any bleeding at all.  This is her second pregnancy.  She herself is a twin and was born with omphalocele/gastroschisis which was repaired at a  young age.  Because of this history we will go ahead and get a level 2 ultrasound at 20 weeks gestation to evaluate for any potential problems with the baby.  Father the baby is a carrier for sickle cell.  Patient's father is also a carrier for cystic fibrosis however patient has been tested and is negative for cystic fibrosis.  Risk assessment was done today.  Patient does need a [place] test for her job.  I discussed with her today that I would be happy to order a QuantiFERON blood test for tuberculosis testing as I am not real excited about her having a mental test while she is pregnant.  After quite lengthy discussion I gave her a note saying that I would prefer not to have her have a mental test because she is pregnant she will give that to her supervisor and will let me know if she needs additional assistance with that.  She has already received a flu shot on 10/2/2018.  Full physical exam was done today.  Please see documentation below.  We did discuss numerous genetic testing that is available and if they are interested will certainly let me know.  Patient did have a vacuum delivery for 6 pound 10 ounce baby with her previous delivery.  There was also some question as to whether she got a spinal epidural because of how dense the block was.  She is quite concerned about both these issues and we discussed at length that we will be very careful about labor in regards to these issues.  There is some discrepancy on dates with her previous delivery in that she thought she was 36 weeks when she delivered her previous child and the chart other places notes that she was 40 weeks.  Patient I discussed the fact that we had an early ultrasound with this pregnancy and are fairly certain about her dates so hopefully that will not happen with this pregnancy.  All their questions were answered today.  We will see them back in 4 weeks for her next OB visit. Total time spent with patient was at least 40 minutes,  of which  "greater than fifty percent was spent in counselling and coordination of care regarding her current medical problems.          PRENATAL VISIT   FIRST OBSTETRICAL EXAM - OB    Assessment / Impression     Normal first prenatal visit at 12w1d  Discussed orientation, general information, lifestyle, nutrition, exercise,warning signs, resources, lab testing, risk screening discussed with patient.  Questions answered.    Plan:      Initial labs drawn.  Patient will continue on prenatal vitamins.   Problem list reviewed and updated.  Genetic screening test options discussed:  Patient and her  will discuss this further and let me know if they are interested in any kind of genetic testing.  Role of ultrasound in pregnancy discussed; fetal survey: We will plan to do level 2 ultrasound at 20 weeks gestation given patient's personal history of omphalmocele.  Follow up: Return in about 4 weeks (around 2018) for next prenatal visit.      Subjective:    Jenny Wagner is a 27 y.o.  here today for her First Obstetrical Exam.   OB History    Para Term  AB Living   2 1 0 1 0 1   SAB TAB Ectopic Multiple Live Births   0 0 0 0 1      # Outcome Date GA Lbr Jonah/2nd Weight Sex Delivery Anes PTL Lv   2 Current            1  10/10/12 36w4d  6 lb 10 oz (3.005 kg) M Vag-Vacuum EPI N ELI      Birth Comments: neg GBS, admit with SROM, spont contractions, ? 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     Blood type O+      Cystic fibrosis screening     Patient tested negative for CF with first pregnancy     Gastroschisis, congenital     Repaired at birth, omphalocele at birth     Normal delivery     x1     " Prematurity, 1,250-1,499 grams, 27-28 completed weeks     Born 3 months early and weighed approximately 3 pounds     Past Surgical History:   Procedure Laterality Date     OTHER SURGICAL HISTORY      Repair of ?gastroschisis vs omphalocele at birth     ParaGard      12/26/2012     TONSILLECTOMY  in high school     Social History   Substance Use Topics     Smoking status: Never Smoker     Smokeless tobacco: Never Used     Alcohol use No      Comment: occ prior to pregnancy     Current Outpatient Prescriptions   Medication Sig Dispense Refill     prenatal no115-iron-folic acid 29 mg iron- 1 mg Chew Chew daily.       No current facility-administered medications for this visit.      Allergies   Allergen Reactions     Penicillins      Twin sister is allergic to it, son is allergic to it, she has never had it.              High Risk Behavior: None    Review of Systems  General:  Denies problem  Eyes: Denies problem  Ears/Nose/Throat: Denies problem  Cardiovascular: Denies problem  Respiratory:  Denies problem  Gastrointestinal:  Denies problem, Genitourinary: Denies problem  Musculoskeletal:  Denies problem  Skin: Denies problem  Neurologic: Denies problem  Psychiatric: Denies problem  Endocrine: Denies problem  Heme/Lymphatic: Denies problem   Allergic/Immunologic: Denies problem       Objective:   Objective    Vitals:    11/02/18 1607   BP: 118/80   Pulse: 70   Temp: 98.6  F (37  C)   Weight: 164 lb (74.4 kg)     Physical Exam:  General Appearance: Alert, cooperative, no distress, appears stated age  Head: Normocephalic, without obvious abnormality, atraumatic  Eyes: PERRL, conjunctiva/corneas clear, EOM's intact  Ears: Normal TM's and external ear canals, both ears  Nose: Nares normal, septum midline,mucosa normal, no drainage  Throat: Lips, mucosa, and tongue normal; teeth and gums normal  Neck: Supple, symmetrical, trachea midline, no adenopathy;  thyroid: not enlarged, symmetric, no tenderness/mass/nodules  Back:  Symmetric, no curvature, ROM normal, no CVA tenderness  Lungs: Clear to auscultation bilaterally, respirations unlabored  Breasts: No breast masses, tenderness, asymmetry, or nipple discharge.  Heart: Regular rate and rhythm, S1 and S2 normal, no murmur, rub, or gallop  Abdomen: Soft, non-tender, bowel sounds active all four quadrants,  no masses, no organomegaly  Pelvic:Normally developed genitalia with no external lesions or eruptions. Vagina and cervix show no lesions, inflammation, discharge or tenderness. No cystocele, No rectocele. Uterus 12 week size.  No adnexal mass or tenderness.  Extremities: Extremities normal, atraumatic, no cyanosis or edema  Skin: Skin color, texture, turgor normal, no rashes or lesions  Lymph nodes: Cervical, supraclavicular, and axillary nodes normal  Neurologic: Normal     Lab:   Results for orders placed or performed in visit on 11/02/18   Culture, Urine   Result Value Ref Range    Culture No Growth    HIV Antigen/Antibody Screening Cascade   Result Value Ref Range    HIV Antigen / Antibody Negative Negative   Thyroid Stimulating Hormone (TSH)   Result Value Ref Range    TSH 2.00 0.30 - 5.00 uIU/mL   Urinalysis Macroscopic   Result Value Ref Range    Color, UA Yellow Colorless, Yellow, Straw, Light Yellow    Clarity, UA Clear Clear    Glucose, UA Negative Negative    Bilirubin, UA Negative Negative    Ketones, UA Negative Negative    Specific Gravity, UA 1.010 1.005 - 1.030    Blood, UA Negative Negative    pH, UA 7.0 5.0 - 8.0    Protein, UA Negative Negative mg/dL    Urobilinogen, UA 0.2 E.U./dL 0.2 E.U./dL, 1.0 E.U./dL    Nitrite, UA Negative Negative    Leukocytes, UA Negative Negative   Syphilis Screen, Cascade   Result Value Ref Range    Treponema Antibody (Syphilis) Negative Negative   HM1 (CBC with Diff)   Result Value Ref Range    WBC 8.3 4.0 - 11.0 thou/uL    RBC 4.70 3.80 - 5.40 mill/uL    Hemoglobin 13.4 12.0 - 16.0 g/dL    Hematocrit 40.4 35.0 - 47.0 %    MCV 86 80 -  100 fL    MCH 28.5 27.0 - 34.0 pg    MCHC 33.2 32.0 - 36.0 g/dL    RDW 13.2 11.0 - 14.5 %    Platelets 259 140 - 440 thou/uL    MPV 10.7 8.5 - 12.5 fL    Neutrophils % 63 50 - 70 %    Lymphocytes % 26 20 - 40 %    Monocytes % 9 2 - 10 %    Eosinophils % 1 0 - 6 %    Basophils % 0 0 - 2 %    Neutrophils Absolute 5.2 2.0 - 7.7 thou/uL    Lymphocytes Absolute 2.1 0.8 - 4.4 thou/uL    Monocytes Absolute 0.8 0.0 - 0.9 thou/uL    Eosinophils Absolute 0.1 0.0 - 0.4 thou/uL    Basophils Absolute 0.0 0.0 - 0.2 thou/uL   Hepatitis B Surface Antigen (HBsAG)   Result Value Ref Range    Hepatitis B Surface Ag Negative Negative   HML Antibody Screen   Result Value Ref Range    HML Antibody Screen Negative Negative

## 2021-06-22 NOTE — PROGRESS NOTES
"Ultrasound report with recommendations and images available under \"imaging\" tab in EPIC.     Omi Joyce MD  Maternal Fetal Medicine    "

## 2021-06-22 NOTE — PROGRESS NOTES
Patient is overall doing well.  She is feeling well.  She is gotten some of her energy back which is great news.  She is feeling occasional baby movement but nothing on any consistent basis.  She denies that she is having any bleeding or contractions.  They declined any kind of genetic testing today.  Will order level 2 ultrasound today.  We will be doing a level 2 ultrasound because of patient's personal history of gastroschisis at birth.  I did place the order and someone should call her to get that ultrasound set up.  We discussed that they do not like to do that before about 18 weeks gestation but certainly if I put the order and hopefully they will call her and get something scheduled for around that gestation.  I did fax a breast pump prescription to [facility] in [place] per patient request.  She has been noticing that she is had a few more headaches and will try to drink more water and see if that is helpful.  She overall feels like things are doing well.  All of her questions were answered today.  We will see her back in 4 weeks for her next OB visit.  I will contact her with results of the ultrasound when it returns.

## 2021-06-22 NOTE — PROGRESS NOTES
Is overall doing well.  She is feeling lots of baby movement and is much more consistent now.  She denies that she is having any bleeding or contractions.  She does note that she continues to have numbness in her hands.  She called in a few weeks ago with that it does not seem like is getting any better.  We discussed that more than likely this is carpal tunnel type symptoms and I am hopeful that a brace will help her.  She notes that she did get a brace from her sister and it actually made things worse.  We will go ahead and give her braces today and if these do not seem like they are helpful she may very well need to see the hand surgeons for further evaluation.  She has already gotten a breast pump.  We will see her back in 4 weeks for her next OB visit.  At that visit she will need a 1 hour glucose tolerance test as well as UA UC and hemoglobin.  We will also order ultrasound to reevaluate the sacral spine which was difficult to visualize on her ultrasound last week.  She otherwise feels like things are doing well.  All of her questions were answered today.  We will see her back in 4 weeks for her next OB visit.  Bilateral wrist splints were given today.

## 2021-06-23 NOTE — PATIENT INSTRUCTIONS - HE
We'll culture the urine to make super sure there's no infection.  We'll call you with those results.    I'll update Dr. Amin with your results and if she has anything to offer, I'll let you know!    Thank you for coming in today!    If you receive a survey from [employer] about your experience today, it would be very helpful if you could fill it out to let us know what went well and what we can improve!    General Information:    Today you had your appointment with Andriy Ashley NP    My hours are:    Monday : Out of clinic  Tuesday : 8:00AM - 5:00 PM  Wednesday: 8:00AM - 5:00 PM  Thursday: 8:00AM - 5:00 PM  Friday: 8:00AM - 5:00 PM    I am not in the office Mondays. Therefore non-urgent calls and medical messages received on Monday will be addressed when I am back in the office on Tuesday. Urgent matters will be reviewed and addressed by one of my partners in the office as needed.    If lab work was done today as part of your evaluation you will generally be contacted via [employer], mail, or phone with the results within 1-5 days. If there is an alarming result we will contact you by phone. Lab results come back at varying times, I generally wait until all lab results are available before making comments on the results.     If you need refills please contact your pharmacy. They will send a refill request to me to review. Please allow 3-5 business days for us to process all refill requests.     My Clinical Assistant is Evy. Please call us at [phone] or send a medical message with any questions or concerns.

## 2021-06-23 NOTE — PROGRESS NOTES
"Chief Complaint   Patient presents with     Urinary Frequency     Started yesterday.        HPI: Patient presents today with concerns about urinary frequency which started last night.  Of note she is 24 weeks 0 days pregnant.  No dysuria, hematuria, history of renal colic, fever, new flank/back pain, risk for STDs, vaginal itching/irritation, vaginal discharge, constipation, diarrhea.    ROS:Review of Systems - History obtained from the patient  General ROS: negative  Hematological and Lymphatic ROS: negative  Respiratory ROS: negative  Cardiovascular ROS: negative  Gastrointestinal ROS: negative  Genito-Urinary ROS: positive for - urinary frequency/urgency    SH: The Patient's  reports that  has never smoked. she has never used smokeless tobacco. She reports that she does not drink alcohol or use drugs.      FH: The Patient's family history includes Bipolar disorder in her brother and mother; Colon cancer in her paternal grandfather; Heart disease in her paternal grandmother; Lung cancer in her paternal grandfather; No Medical Problems in her brother, sister, sister, and sister; Other in her father and sister; Thyroid disease in her mother.     Meds:    Current Outpatient Medications on File Prior to Visit   Medication Sig Dispense Refill     prenatal no115-iron-folic acid 29 mg iron- 1 mg Chew Chew daily.       No current facility-administered medications on file prior to visit.        O:  /74   Pulse 75   Temp 97.7  F (36.5  C) (Oral)   Ht 5' 3\" (1.6 m)   Wt 188 lb (85.3 kg)   LMP 08/09/2018   SpO2 98%   BMI 33.30 kg/m      Physical Examination:   General appearance - alert, well appearing, and in no distress  Mental status - alert, oriented to person, place, and time  Lymphatics - no palpable lymphadenopathy, no hepatosplenomegaly  Chest - clear to auscultation, no wheezes, rales or rhonchi, symmetric air entry  Heart - normal rate and regular rhythm, S1 and S2 normal, no murmurs noted  Abdomen - " soft, nontender, nondistended, no masses or organomegaly  Extremities - peripheral pulses normal, no pedal edema, no clubbing or cyanosis  Skin - normal coloration and turgor, no rashes, no suspicious skin lesions noted      A/P:     Problem List Items Addressed This Visit     None      Visit Diagnoses     UTI symptoms    -  Primary    Relevant Orders    Urinalysis (Completed)    Culture, Urine            1. UTI symptoms  Urine macro shows only trace leukocytes.  Micro not available right now.  Culture ordered.  Most likely pregnancy related changes.  No symptoms of BV/yeast.  We did discuss gestational diabetes however she plans to follow-up with her provider in 4 days and will have glucose tolerance testing done at that time anyways so she elects to wait.  - Urinalysis  - Culture, Urine        Andriy Ashley, CNP

## 2021-06-23 NOTE — PROGRESS NOTES
She is overall doing okay.  She is feeling lots of baby movement.  She denies that she is having any bleeding or contractions.  Her blood pressure initially when she arrived was quite elevated.  She notes that she had just had a big drink of water and it went down the wrong throat and so she was coughing quite violently just before they did the blood pressure readings that she thinks that is probably why this was elevated.  Recheck of her blood pressure so is her blood pressure to be in the normal range.  She denies that she is having any swelling or headache or other symptoms of preeclampsia.  She was seen last week for urinary frequency.  Urinalysis at that time was negative.  That symptom has almost completely gone away now which she is quite quite grateful for.  1 hour glucose tolerance test as well as UA UC and that today.  Vitamin D level was also repeated today.  She will look into taking classes.  She has given birth in the past but her significant other did not take classes last time and she would like him to take them.  She will let me know if she has difficulty finding out information about classes.  She does not feel like they need to take a tour of the hospital.  She already has a breast pump.  I did order a repeat ultrasound today to recheck sacral spine as that was unable to be visualized on a 20-week ultrasound.  She was given the phone number to call and we will schedule that herself.  If she has difficulty scheduling that will certainly let me know.  Risk assessment was done today.  All of her questions were answered today.  We will see her back in 2 weeks for her next OB visit.

## 2021-06-24 NOTE — TELEPHONE ENCOUNTER
FYI - Status Update  Who is Calling: [facility] ultrasound  Update: Caller stated that patient's ANKUR is 25.9  Okay to leave a detailed message?:  No return call needed

## 2021-06-24 NOTE — PROGRESS NOTES
She is overall doing okay.  She is feeling lots of baby movement.  She denies that she is having any bleeding.  She notes that she will occasionally have a contraction but is not painful and happens once may be a day.  She certainly is not having any regular repetitive contractions at all.  She overall feels like things are doing well.  We discussed the ultrasound that was done on 3/1/2019 at length today.  She had an ultrasound at 25 weeks which showed that she had borderline polyhydramnios with an ANKUR of 24.3.  I repeated the ultrasound month later and ANKUR on 3/1/2019 was 25.9.  We are going to recheck this in about a month again but I did call and speak with [facility] and they recommended having a ultrasound with them again to make sure that there is no other abnormalities that could be contributing to the increased fluid.  Referral was sent today.  We will have them repeat ultrasound to check ANKUR again.  They recommended checking ANKUR every 2 weeks if it remains in the category but if it goes above 30 then she needs weekly biophysical profiles.  She also needs an ultrasound for growth.  I did order all of the above and patient should be contacted by [facility] to schedule an appointment for follow-up ultrasound for polyhydramnios as well as to check fetal growth.  I did explain all this to patient and all of her questions were answered.  We will see her back in 2 weeks for her next OB visit.  She has any problems or concerns prior to that will let me know.

## 2021-06-24 NOTE — TELEPHONE ENCOUNTER
I called to follow up with patient and did not get an answer. I left message that as I understood it her contractions had resolved, however if that is not the case or if they return she should certainly call us back.

## 2021-06-24 NOTE — TELEPHONE ENCOUNTER
"Pt reports 31 weeks pregnant.  States \"I can tell I'm having Zeus Diaz contractions.\"  Onset 5:30 am -> to now 7:30 am.  Pt states \"Doctor said to call if I had contractions over a two-hour period.\"  Had 4 contractions over this two-hour period.  Each contraction lasted about 30 seconds.    No other symptoms whatsoever.  No new vag discharge.  No pelvic pressure.  Feels well.  Baby moving as usual.    Pt wishes to notify PCP of today's new symptoms.  Done now.    Melvi Aviles RN BSBA  [facility] RN Triage     Reason for Disposition    Mild contractions > 10 minutes apart    Protocols used: PREGNANCY - LABOR - [id number]      "

## 2021-06-24 NOTE — PROGRESS NOTES
Patient is overall doing fine.  She is feeling lots of baby movement.  She denies that she is having any bleeding.  She is having occasional contraction but nothing on any regular basis.  She feels overall tired and uncomfortable already with this pregnancy but otherwise is doing fine.  They are planning to do online classes and will start those soon.  She thinks that they probably will do a tour of the hospital and she was given directions on how to go about scheduling that.  She did have borderline polyhydramnios on her ultrasound at 25 weeks gestation and so will order another ultrasound to recheck amniotic fluid volume.  Tdap was given today.  RPR was also drawn today.  She already has a breast pump.  We do not need to worry about a circumcision as she is having a girl.  All of her questions were answered today.  Will contact her with the results of the ultrasound when it returns.  We will see her in 2 weeks for her next OB visit.  If she has additional questions or concerns prior to that should let me know.

## 2021-06-24 NOTE — TELEPHONE ENCOUNTER
I did call and talk with Dr. Méndez regarding the ANKUR which is now up to 25.9.  He agrees that this is borderline elevated but nothing that would need to be overly concerned about.  He has recommended recheck ANKUR in 1 month.  He does not feel like there is any other testing that needs to be done at this point.  Patient passed her 1 hour glucose tolerance test and there were no abnormalities noted on the level 2 ultrasound at 20 weeks gestation.  I did call patient and let her know about this as well.  All of her questions were answered.  We will plan to recheck another ultrasound at 33 weeks gestation to recheck ANKUR.  If patient has additional questions or concerns she was encouraged to call me back.

## 2021-06-25 NOTE — PROGRESS NOTES
"Please see \"Imaging\" tab under \"Chart Review\" for details of today's US.    Shyann Garcia  Maternal-Fetal Medicine      "

## 2021-06-25 NOTE — TELEPHONE ENCOUNTER
RN Triage [facility]    RN Phone Assessment  Pt is calling to ask Dr. Amin about constant back pain. She want to know what she recommends.   31 weeks pregnant, 5/16/19  Pain started Wednesday, 3/13/19  Pain is located both sides of lower back, sharp shooting pain every time she walks. Rated 8-10/ 10, increases with increased walking.   Pain does not radiate.   Aches when laying down, rated 3-4/10  Pain interferes with normal activities  No recent injuries/falls  Shoveled snow last time it snowed, low back was sore after but the pain was not like it is now.   Treated with heating pad and stretching, no meds  No numbness/weakness/bowel or bladder control   Has had heike fairbanks contractions, called clinic, last one an hour ago. Pt states  Said if contractions are 4 or more for 2 hours, call back. This has not happened today.   No vaginal discharge or bleeding  Baby movement is normal.   Swelling, ankles and pregnancy carpal tunnel which causes hands to swell, not more than normal.    Pt requests a call back from Dr. Amin.     Reviewed Care Advice per Protocol  Office visit recommended per protocol but patient requests a call back from Dr. Amin. Okay to leave a detailed message on [employer]. Please [phone]  States no further questions. Encouraged to call back as needed.     Adela Gregory, RN, [facility] Nurse Triage    Reason for Disposition    [1] MODERATE back pain (e.g., interferes with normal activities or sleep) AND [2] present > 3 days    Protocols used: PREGNANCY - BACK PAIN-A-

## 2021-06-25 NOTE — TELEPHONE ENCOUNTER
I received a call from Dr. Garcia, [facility], concerning patient.  She had an ultrasound today.  It was a follow-up ultrasound from the ultrasound that was done several weeks ago showing that patient had polyhydramnios.  No other abnormalities were noted on the ultrasounds that were done at 25 and 29 weeks at [facility].  Ultrasound today at [facility] at 31 weeks shows macroglossia and significant increase in poly-hydramnios.  They got an ANKUR of 31 today and abdominal circumference is at the 99th percentile.  There is concerned that baby has Kenyatta-Weidemann syndrome.  At this time patient is going to set up another appointment with [facility] who will coordinate getting her in contact with specialists who may need to be involved with baby after she is born.  Baby has an enlarged tongue today which can cause significant airway issues immediately after birth.  Patient's  pregnancy should not otherwise be complicated other than the polyhydramnios which will need to be monitored carefully as well as the potential increase in risk for preeclampsia with this diagnosis.  It has been recommended that patient be seen on a weekly basis to have her blood pressure checked to evaluate for possible preeclampsia.  Dr. Garcia would like me to continue to follow patient for her regular OB visits and then she will see [facility] as well for monitoring of the congenital abnormalities.  Discussion was had with patient regarding delivery at the [facility] due to potential complications that may come immediately after birth.  I did call and talk with patient regarding all of the above information.  She is feeling quite confused currently with all of this information as she digests all of this. She feels like she has all the information she can at this time and does not feel like she needs any additional assistance currently.  All of her questions were answered to the best of my ability. I will see her back next Friday in followup.    If indeed patient  does have Kenyatta-Weidemann this would put baby at higher risk for hypoglycemia after birth.  The other things that go along with this are hemihypertrophy as well as large abdominal organs and macrosomia.  She is also at increased risk for childhood cancers including Wilms tumor, hepatoblastoma, neuroblastoma or rhabdo myosarcoma.  There is a 10-15% chance that this is genetic in nature.  The abnormality usually resides on chromosome 11.  They can also have renal problems as where as ear pitting and thus will need to monitor for all of this with the baby.   There is also risk for severe hypoglycemia at the time of birth.  Given potential cardiomegaly fetal echo will be arranged in the next couple of weeks with pediatric cardiology.  [facility] has recommended weekly biophysical profiles given the polyhydramnios as well as a growth ultrasound in 3 weeks which has been already arranged through [facility].

## 2021-06-25 NOTE — TELEPHONE ENCOUNTER
I did call and speak with patient. She had 4 contractions in an hour for 2 hours in a row this am but that has now stopped. She has had 1 contraction since 730 this am and it is now 130 pm. She will continue to monitor for any contractions and should call back if she has any questions or concerns or if she has another episode of frequent contractions. Patient denies any other symptoms currently. Will see for her next ob visit but patient was encouraged to call sooner with any questions or concerns.

## 2021-06-25 NOTE — PROGRESS NOTES
"Chief Complaint   Patient presents with     Follow-up     BURN ON R HAND. feels and better than before. still hurting when things rub on it.        HPI: Patient presents today after sustaining a burn on her right hand.  She went to the walk-in clinic.  She was given antibiotic ointment and told to wrap it.  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  Pain is under better control.  She did have a blister which popped.  Clear drainage from this.  No purulent drainage.  Redness does not seem to be spreading.  This did occur at work.  She was given a note to avoid work where she has to wear gloves up until today and will be on PTO next week.  She is hopeful that by that point the skin will have scabbed over enough where wearing gloves will be a problem.    ROS:Review of Systems - negative except for what's listed in the HPI    SH: The Patient's  reports that she has never smoked. She has never used smokeless tobacco. She reports that she does not drink alcohol or use drugs.      FH: The Patient's family history includes Bipolar disorder in her brother and mother; Colon cancer in her paternal grandfather; Heart disease in her paternal grandmother; Lung cancer in her paternal grandfather; No Medical Problems in her brother, sister, sister, and sister; Other in her father and sister; Thyroid disease in her mother.     Meds:    Current Outpatient Medications on File Prior to Visit   Medication Sig Dispense Refill     bacitracin-polymyxin b (POLYSPORIN) ointment Apply topically daily. Apply to affected area daily 30 g 1     cholecalciferol, vitamin D3, (VITAMIN D3) 5,000 unit Tab Take 1 tablet by mouth Daily at 8:00 am..       prenatal no115-iron-folic acid 29 mg iron- 1 mg Chew Chew daily.       No current facility-administered medications on file prior to visit.        O:  /68   Pulse 71   Ht 5' 3\" (1.6 m)   Wt 157 lb 6.4 oz (71.4 kg)   LMP 05/20/2021 (Approximate)   SpO2 " 98%   Breastfeeding Yes Comment: baby is weaning  BMI 27.88 kg/m      Physical Examination:   General appearance - alert, well appearing, and in no distress  Mental status - alert, oriented to person, place, and time  Extremities - peripheral pulses normal, no peripheral edema, capillary refill <3 seconds  Skin -at the base of the thumb on the right hand there is an area of redness.  No blistering.  Mildly tender to touch.  No deeper layer of the skin exposed.  No chart skin.      A/P:     Problem List Items Addressed This Visit     None      Visit Diagnoses     Burn    -  Primary    Encounter related to worker's compensation claim            Patient doing considerably better from burn she sustained while at work.  Skin is still irritated, but healing as anticipated.  No further blistering.  No signs of infection.  I would anticipate that by this time next week she should be okay to return to work without restrictions.  She will let me know if that is not the case.  Pain is almost nonexistent.  Wound supplies provided.    1. Burn    2. Encounter related to worker's compensation claim    Total time spent was at least 20 minutes including reviewing records prior to arrival, consultation, placing orders, education, and reviewing the plan of care on the date of service.      Andriy Ashley, CNP      This note has been dictated using voice recognition software. Any grammatical or context distortions are unintentional and inherent to the software.

## 2021-06-25 NOTE — TELEPHONE ENCOUNTER
If the back pain is constant and it does not feel like contractions, I would suggest a pregnancy support belt to see if this will help to redistribute her weight off of her back. Otherwise she can try warm baths and tylenol. There really is not much else to try unfortunately.

## 2021-06-25 NOTE — PROGRESS NOTES
[facility]  Genetic Counseling Consult    Patient: Jenny Santiago YOB: 1990   Date of Service:  03/15/2019      Jenny Santiago was seen at the [facility] for genetic consultation given abnormal ultrasound findings.      Impression/Plan:   I met with Marylin at the request of Dr. Garcia. 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 We reviewed that these findings can be associated with Kenyatta-Weidemann syndrome. See Risk Assessment below. The patient declined testing for BWS at this time. She was provided my contact information if she has additional questions.    Medical History:   Jenny molina reported medical history is not expected to impact pregnancy management or risks to fetal development.       Family History:   A three-generation pedigree was obtained, and is scanned under the  Media  tab.   The following significant findings were reported by Jenny:    Jenny was born with an omphalocele. This was repaired in childhood and the patient reports that she's otherwise been healthy.    Fetal findings on today's ultrasound include polyhydramnios, large for gestational age and macroglossia.    Otherwise, the reported family history is negative for multiple miscarriages, stillbirths, birth defects, mental retardation, known genetic conditions, and consanguinity.          Risk Assessment and Testing Options:   We explained that the risk for fetal chromosome abnormalities increases with maternal age. We discussed specific features of common chromosome abnormalities, including Down syndrome, trisomy 13, trisomy 18, and sex chromosome trisomies.      At age 28 at delivery, the risk to have a baby with Down syndrome is 1 in 1000.    At age 28 at delivery, the risk to have a baby with any chromosome abnormality is 1 in  417.     The patient had a fetal anatomy scan today. Fetal findings on today's ultrasound include polyhydramnios, large for gestational age and macroglossia. These finding may be associated with a chromosome abnormality.  This prenatal history in combination with the patient's personal history of omphalocele we reviewed the possibility of a diagnosis of Kenyatta Wiedemann syndrome. Kenyatta-Weidemann syndrome is considered an overgrowth syndrome and often infants will be larger than normal. Growth does usually slow around age 8. Affected individuals are expected to have a normal life expectancy and be of average height and normal intelligence. Signs present at birth may include an umbilical hernia, a large tongue, and hypoglycemia. A cancer risk is limited to childhood so screenings will end as the child transitions into adolescence.     Kenyatta-Weidemann syndrome testing often looks for errors at the chromosome level. For some chromosome pairs, a chromosome is either  on  or  off  depending on what parent that chromosome was inherited from. If a chromosome is  on  that means the cells is actively reading the instructions in that chromosome and making a product the cell and body is using. Sometime there is an error referred to as a methylation error on a chromosome, which may lead the cell to read instructions it usually does not. For Kenyatta-Wiedemann we are concerned with the instructions the cell is reading from chromosome 11. Typically the cells reads instructions from the chromosome 11 inherited from the father that directs the cells and body to grow. The cell also reads instructions from chromosome 11 inherited from the mother that tell the cells to slow down growth. It is the balance of these instructions that ensure typical growth. In Kenyatta-Wiedemann a loss of methylation on the chromosome 11 inherited from the mother turns on the growth instructions. This means that the cell is reading two copies of  instructions for growth and this can lead to more growth than typical. This type of genetic change occurs sporadically and is no one is at fault.    Kenyatta-Weidemann syndrome can also be cause by point mutation in the CDKN1C gene. This type of BWS can be inherited from a mother with BWS and is associated with a 50% recurrence risk for children.  Mutations in CDKN1C account for 40% of familial cases of BWS. I reviewed the possibility of testing either Marylin or her pregnancy for BWS. She is uncertain at today's visit if she wants to pursue testing. She was provided my contact information. We also discussed having the baby evaluated by a medical geneticist following delivery.  It was a pleasure to be involved with Jenny s care. Face-to-face time of the meeting was 25 minutes.  Avani Llamas MS, [facility]  Maternal Fetal Medicine  [facility]  Phone:[phone]  Phone: [phone]  Email: [email or web address]

## 2021-06-25 NOTE — NURSING NOTE
Dr Garcia recommending transfer of care to [facility]. Discussed with PCP DR. Amin. Dr Garcia to facilitate transfer.  Wendie Armstrong  11:57 AM

## 2021-07-04 NOTE — PATIENT INSTRUCTIONS - HE
Patient Instructions by Kashif Parra PA-C at 5/28/2021  5:20 PM     Author: Kashif Parra PA-C Service: -- Author Type: Physician Assistant    Filed: 5/28/2021  6:41 PM Encounter Date: 5/28/2021 Status: Addendum    : Kashif Parra PA-C (Physician Assistant)    Related Notes: Original Note by Kashif Parra PA-C (Physician Assistant) filed at 5/28/2021  6:40 PM       You were seen for a burn injury.    Symptom management:  - May use tylenol or ibuprofen for discomfort  - Elevate the affected area to reduce swelling  - Change dressings every 24 hours including antibioic ointment, non-adhesive dressing, and a band-aid on top to secure  - Clean wound in between dressing changes with luke-warm water and gentle soap. Lightly pat dry. If possible, avoid bathing or submerging the wound.  - If you were prescribed oral antibiotics, take as directed for the full course, even if symptoms improve  - Follow-up as instructed    Reasons to be seen immediately in emergency room:  - Not tolerating fluids  - Frequent vomiting  - Redness around wound continues to spread  - Develop a fever of 100.4F or higher    Patient Education     Burn Wound: Wound Check, No Infection  Your burn is healing as expected.  Home care  Follow these guidelines when caring for yourself at home:    If a bandage was put on, you should change it once a day, unless told otherwise. If the bandage sticks, soak it off in warm water. A bandage left in place too long can make an infection worse.    Wash the area with soap and water to remove all cream, ointment, ooze, or scab. You may do this in a sink, under a tub faucet, or in the shower. Rinse off the soap and pat dry with a clean towel. Look for signs of infection.    Put cream or ointment on the wound to prevent infection and to keep the bandage from sticking.    Cover the burn with nonstick gauze. Then wrap it with the bandage material.    If the bandage becomes wet or soiled,  change it as soon as you can.    You may use acetaminophen or ibuprofen to control pain, unless another pain medicine was prescribed. If you have chronic liver or kidney disease, talk with your healthcare provider before using these medicines. Also talk with your provider if youve had a stomach ulcer or GI (gastrointestinal) bleeding.    Ask your provider if you need a tetanus shot.  Follow-up care  Follow up with your healthcare provider, or as advised. Most burns heal without infection. Sometimes an infection may occur even with proper treatment. So check the burn every day for the signs of infection listed below.  When to seek medical advice  Call your healthcare provider right away if any of these occur:    Pain in the wound gets worse    Redness or swelling gets worse    Pus comes from the wound    Fever of 100.4 F (38 C) or higher, or as directed by your healthcare provider  Date Last Reviewed: 1/1/2017 2000-2017 [employer]. [address] [place] [address], [place] [address]. All rights reserved. This information is not intended as a substitute for professional medical care. Always follow your healthcare professional's instructions.

## 2021-07-04 NOTE — PROGRESS NOTES
Progress Notes by Kashif Parra PA-C at 5/28/2021  5:20 PM     Author: Ksahif Parra PA-C Service: -- Author Type: Physician Assistant    Filed: 5/28/2021  7:32 PM Encounter Date: 5/28/2021 Status: Signed    : Kashif Parra PA-C (Physician Assistant)         Assessment & Plan:       1. Partial thickness burn of back of right hand, initial encounter  bacitracin-polymyxin b (POLYSPORIN) ointment      Medical Decision Making  Patient presents after sustaining a burn to the back of the right hand resulting in a blister.  Evaluation of burn shows a superficial partial-thickness burn with a blister about 1.5 cm in diameter.  Patient's pain is minimal and wound does not involve any joints.  No signs of secondary bacterial cellulitis.  Sent topical Polysporin antibiotic ointment.  Discussed appropriate dressings and recommended daily dressing changes.  Applied Ace wrap over the wound to help secure the dressing and protect the burn site.  Discussed expected course of healing.  Recommended follow-up in 1 week with primary care for wound recheck.  Provided note for work.  Discussed signs of worsening symptoms and when to be seen immediately for reevaluation.  Patient's tetanus was up-to-date.    Patient Instructions   You were seen for a burn injury.    Symptom management:  - May use tylenol or ibuprofen for discomfort  - Elevate the affected area to reduce swelling  - Change dressings every 24 hours including antibioic ointment, non-adhesive dressing, and a band-aid on top to secure  - Clean wound in between dressing changes with luke-warm water and gentle soap. Lightly pat dry. If possible, avoid bathing or submerging the wound.  - If you were prescribed oral antibiotics, take as directed for the full course, even if symptoms improve  - Follow-up as instructed    Reasons to be seen immediately in emergency room:  - Not tolerating fluids  - Frequent vomiting  - Redness around wound continues to  spread  - Develop a fever of 100.4F or higher    Patient Education     Burn Wound: Wound Check, No Infection  Your burn is healing as expected.  Home care  Follow these guidelines when caring for yourself at home:    If a bandage was put on, you should change it once a day, unless told otherwise. If the bandage sticks, soak it off in warm water. A bandage left in place too long can make an infection worse.    Wash the area with soap and water to remove all cream, ointment, ooze, or scab. You may do this in a sink, under a tub faucet, or in the shower. Rinse off the soap and pat dry with a clean towel. Look for signs of infection.    Put cream or ointment on the wound to prevent infection and to keep the bandage from sticking.    Cover the burn with nonstick gauze. Then wrap it with the bandage material.    If the bandage becomes wet or soiled, change it as soon as you can.    You may use acetaminophen or ibuprofen to control pain, unless another pain medicine was prescribed. If you have chronic liver or kidney disease, talk with your healthcare provider before using these medicines. Also talk with your provider if youve had a stomach ulcer or GI (gastrointestinal) bleeding.    Ask your provider if you need a tetanus shot.  Follow-up care  Follow up with your healthcare provider, or as advised. Most burns heal without infection. Sometimes an infection may occur even with proper treatment. So check the burn every day for the signs of infection listed below.  When to seek medical advice  Call your healthcare provider right away if any of these occur:    Pain in the wound gets worse    Redness or swelling gets worse    Pus comes from the wound    Fever of 100.4 F (38 C) or higher, or as directed by your healthcare provider  Date Last Reviewed: 1/1/2017 2000-2017 [employer]. [address] [place] [address], [place] [address]. All rights reserved. This information is not intended as a substitute for professional  medical care. Always follow your healthcare professional's instructions.                     Subjective:       Jenny Santiago is a 30 y.o. female here for evaluation of a burn to the back of the right hand.  Event of injury occurred yesterday.  Patient was heating up a cup of soup when the soup tripped and fell on the patient's hand.  She instantly went to wash the soup off with cold water.  She has since developed significant redness, pain, and a blister.  Patient did open up the blister yesterday to drain some of the fluid due to the tension.  Patient applied topical Neosporin as well as aloe vera with some relief.  She notes good improvement of the pain so far today.  She did note some finger tingling yesterday, that has since resolved.    The following portions of the patient's history were reviewed and updated as appropriate: allergies, current medications and problem list.    Review of Systems  Pertinent items are noted in HPI.     Allergies  Allergies   Allergen Reactions   ? Penicillins      Twin sister is allergic to it, son is allergic to it, she has never had it.        Family History   Problem Relation Age of Onset   ? Thyroid disease Mother    ? Bipolar disorder Mother    ? Other Father         cystic fibrosis carrier   ? Other Sister         fraternal twin   ? Bipolar disorder Brother    ? Heart disease Paternal Grandmother         valve replaced   ? Lung cancer Paternal Grandfather         smoker   ? Colon cancer Paternal Grandfather    ? No Medical Problems Brother    ? No Medical Problems Sister    ? No Medical Problems Sister    ? No Medical Problems Sister        Social History     Socioeconomic History   ? Marital status:      Spouse name: Omi Santiago   ? Number of children: 2   ? Years of education: None   ? Highest education level: None   Occupational History   ? Occupation: dental assistant   Social Needs   ? Financial resource strain: None   ? Food insecurity     Worry: None      Inability: None   ? Transportation needs     Medical: None     Non-medical: None   Tobacco Use   ? Smoking status: Never Smoker   ? Smokeless tobacco: Never Used   Substance and Sexual Activity   ? Alcohol use: No     Comment: occ prior to pregnancy   ? Drug use: No   ? Sexual activity: Yes     Partners: Male     Comment: pregnancy   Lifestyle   ? Physical activity     Days per week: None     Minutes per session: None   ? Stress: None   Relationships   ? Social connections     Talks on phone: None     Gets together: None     Attends Christian service: None     Active member of club or organization: None     Attends meetings of clubs or organizations: None     Relationship status: None   ? Intimate partner violence     Fear of current or ex partner: None     Emotionally abused: None     Physically abused: None     Forced sexual activity: None   Other Topics Concern   ? None   Social History Narrative   ? None         Objective:       /56 (Patient Site: Left Arm, Patient Position: Sitting, Cuff Size: Adult Regular)   Pulse 62   Temp 98.2  F (36.8  C) (Oral)   Resp 14   Wt 154 lb (69.9 kg)   LMP 05/20/2021 (Approximate)   SpO2 97%   BMI 27.28 kg/m    General appearance: alert, appears stated age, cooperative, no distress and non-toxic  Extremities: Right hand: Full range of motion of all joints without difficulty  Skin: Partial-thickness burn involving the back of the right hand between the first and second digits extending to the wrist; single fluid-filled blister about 1.5 cm in diameter appears intact with no purulent fluid  Neurologic: Sensation light touch in the distal fingers of the right hand is intact and symmetrical

## 2021-07-04 NOTE — LETTER
Letter by Kashif Parra PA-C at      Author: Kashif Parra PA-C Service: -- Author Type: --    Filed:  Encounter Date: 5/28/2021 Status: (Other)         May 28, 2021     Patient: Jenny Santiago   YOB: 1990   Date of Visit: 5/28/2021       To Whom it May Concern:    Jenny Santiago was seen in my clinic on 5/28/2021.  May continue to work, but should avoid use of wearing gloves until wound is healed. Recommend at least one week of avoiding glove use from 5/28/2021 - 6/4/2021.    If you have any questions or concerns, please don't hesitate to call.    Sincerely,         Electronically signed by Kashif Parra PA-C

## 2021-07-06 VITALS
BODY MASS INDEX: 27.28 KG/M2 | RESPIRATION RATE: 14 BRPM | HEART RATE: 62 BPM | WEIGHT: 154 LBS | OXYGEN SATURATION: 97 % | DIASTOLIC BLOOD PRESSURE: 56 MMHG | TEMPERATURE: 98.2 F | SYSTOLIC BLOOD PRESSURE: 121 MMHG

## 2021-07-06 VITALS
HEIGHT: 63 IN | BODY MASS INDEX: 27.89 KG/M2 | HEART RATE: 71 BPM | SYSTOLIC BLOOD PRESSURE: 118 MMHG | WEIGHT: 157.4 LBS | DIASTOLIC BLOOD PRESSURE: 68 MMHG | OXYGEN SATURATION: 98 %

## 2021-08-15 ENCOUNTER — HEALTH MAINTENANCE LETTER (OUTPATIENT)
Age: 31
End: 2021-08-15

## 2021-10-10 ENCOUNTER — HEALTH MAINTENANCE LETTER (OUTPATIENT)
Age: 31
End: 2021-10-10

## 2021-10-25 ENCOUNTER — MYC MEDICAL ADVICE (OUTPATIENT)
Dept: FAMILY MEDICINE | Facility: CLINIC | Age: 31
End: 2021-10-25

## 2021-10-27 ENCOUNTER — LAB (OUTPATIENT)
Dept: FAMILY MEDICINE | Facility: CLINIC | Age: 31
End: 2021-10-27
Attending: FAMILY MEDICINE
Payer: COMMERCIAL

## 2021-10-27 ENCOUNTER — MYC MEDICAL ADVICE (OUTPATIENT)
Dept: FAMILY MEDICINE | Facility: CLINIC | Age: 31
End: 2021-10-27

## 2021-10-27 DIAGNOSIS — Z20.822 EXPOSURE TO 2019 NOVEL CORONAVIRUS: ICD-10-CM

## 2021-10-27 DIAGNOSIS — Z20.822 EXPOSURE TO 2019 NOVEL CORONAVIRUS: Primary | ICD-10-CM

## 2021-10-27 LAB — SARS-COV-2 RNA RESP QL NAA+PROBE: NEGATIVE

## 2021-10-27 PROCEDURE — U0005 INFEC AGEN DETEC AMPLI PROBE: HCPCS

## 2021-10-27 PROCEDURE — U0003 INFECTIOUS AGENT DETECTION BY NUCLEIC ACID (DNA OR RNA); SEVERE ACUTE RESPIRATORY SYNDROME CORONAVIRUS 2 (SARS-COV-2) (CORONAVIRUS DISEASE [COVID-19]), AMPLIFIED PROBE TECHNIQUE, MAKING USE OF HIGH THROUGHPUT TECHNOLOGIES AS DESCRIBED BY CMS-2020-01-R: HCPCS

## 2022-02-26 ENCOUNTER — MYC MEDICAL ADVICE (OUTPATIENT)
Dept: FAMILY MEDICINE | Facility: CLINIC | Age: 32
End: 2022-02-26
Payer: COMMERCIAL

## 2022-05-26 ENCOUNTER — MYC MEDICAL ADVICE (OUTPATIENT)
Dept: FAMILY MEDICINE | Facility: CLINIC | Age: 32
End: 2022-05-26
Payer: COMMERCIAL

## 2022-05-26 DIAGNOSIS — Z30.012 EMERGENCY CONTRACEPTION: Primary | ICD-10-CM

## 2022-05-26 RX ORDER — LEVONORGESTREL 1.5 MG/1
1.5 TABLET ORAL ONCE
Qty: 1 TABLET | Refills: 0 | Status: SHIPPED | OUTPATIENT
Start: 2022-05-26 | End: 2023-04-10

## 2022-05-31 ENCOUNTER — APPOINTMENT (OUTPATIENT)
Dept: URBAN - METROPOLITAN AREA CLINIC 260 | Age: 32
Setting detail: DERMATOLOGY
End: 2022-05-31

## 2022-05-31 VITALS — HEIGHT: 63.5 IN | WEIGHT: 150 LBS

## 2022-05-31 DIAGNOSIS — D49.2 NEOPLASM OF UNSPECIFIED BEHAVIOR OF BONE, SOFT TISSUE, AND SKIN: ICD-10-CM

## 2022-05-31 DIAGNOSIS — D18.0 HEMANGIOMA: ICD-10-CM

## 2022-05-31 DIAGNOSIS — Z71.89 OTHER SPECIFIED COUNSELING: ICD-10-CM

## 2022-05-31 DIAGNOSIS — L81.4 OTHER MELANIN HYPERPIGMENTATION: ICD-10-CM

## 2022-05-31 DIAGNOSIS — L82.1 OTHER SEBORRHEIC KERATOSIS: ICD-10-CM

## 2022-05-31 DIAGNOSIS — D22 MELANOCYTIC NEVI: ICD-10-CM

## 2022-05-31 PROBLEM — D18.01 HEMANGIOMA OF SKIN AND SUBCUTANEOUS TISSUE: Status: ACTIVE | Noted: 2022-05-31

## 2022-05-31 PROBLEM — D22.5 MELANOCYTIC NEVI OF TRUNK: Status: ACTIVE | Noted: 2022-05-31

## 2022-05-31 PROBLEM — D23.39 OTHER BENIGN NEOPLASM OF SKIN OF OTHER PARTS OF FACE: Status: ACTIVE | Noted: 2022-05-31

## 2022-05-31 PROCEDURE — 11102 TANGNTL BX SKIN SINGLE LES: CPT

## 2022-05-31 PROCEDURE — 99213 OFFICE O/P EST LOW 20 MIN: CPT | Mod: 25

## 2022-05-31 PROCEDURE — OTHER EDUCATIONAL RESOURCES PROVIDED: OTHER

## 2022-05-31 PROCEDURE — OTHER BIOPSY BY SHAVE METHOD: OTHER

## 2022-05-31 PROCEDURE — 11103 TANGNTL BX SKIN EA SEP/ADDL: CPT

## 2022-05-31 PROCEDURE — OTHER MIPS QUALITY: OTHER

## 2022-05-31 PROCEDURE — OTHER COUNSELING: OTHER

## 2022-05-31 ASSESSMENT — LOCATION DETAILED DESCRIPTION DERM
LOCATION DETAILED: INFERIOR THORACIC SPINE
LOCATION DETAILED: RIGHT MEDIAL UPPER BACK
LOCATION DETAILED: LEFT INFERIOR UPPER BACK

## 2022-05-31 ASSESSMENT — LOCATION SIMPLE DESCRIPTION DERM
LOCATION SIMPLE: LEFT UPPER BACK
LOCATION SIMPLE: UPPER BACK
LOCATION SIMPLE: RIGHT UPPER BACK

## 2022-05-31 ASSESSMENT — LOCATION ZONE DERM: LOCATION ZONE: TRUNK

## 2022-05-31 NOTE — PROCEDURE: BIOPSY BY SHAVE METHOD
Billing Type: Client Bill
Hide Second Anesthesia?: No
Was A Bandage Applied: Yes
Path Notes Override (Will Replace All Of The Above Text): Please confirm margins
Information: Selecting Yes will display possible errors in your note based on the variables you have selected. This validation is only offered as a suggestion for you. PLEASE NOTE THAT THE VALIDATION TEXT WILL BE REMOVED WHEN YOU FINALIZE YOUR NOTE. IF YOU WANT TO FAX A PRELIMINARY NOTE YOU WILL NEED TO TOGGLE THIS TO 'NO' IF YOU DO NOT WANT IT IN YOUR FAXED NOTE.
Curettage Text: The wound bed was treated with curettage after the biopsy was performed.
Biopsy Type: H and E
Anesthesia Volume In Cc (Will Not Render If 0): 0.5
Wound Care: Petrolatum
Additional Anesthesia Volume In Cc (Will Not Render If 0): 0
Type Of Destruction Used: Curettage
Detail Level: Detailed
Consent: Written consent was obtained and risks were reviewed including but not limited to scarring, infection, bleeding, scabbing, incomplete removal, nerve damage and allergy to anesthesia.
Electrodesiccation Text: The wound bed was treated with electrodesiccation after the biopsy was performed.
Post-Care Instructions: I reviewed with the patient in detail post-care instructions. Patient is to keep the biopsy site dry overnight, and then apply bacitracin twice daily until healed. Patient may apply hydrogen peroxide soaks to remove any crusting.
Silver Nitrate Text: The wound bed was treated with silver nitrate after the biopsy was performed.
Dressing: bandage
Notification Instructions: Patient will be notified of biopsy results. However, patient instructed to call the office if not contacted within 2 weeks.
Depth Of Biopsy: dermis
Biopsy Method: Dermablade
Hemostasis: Drysol
Anesthesia Type: 1% lidocaine with epinephrine
Electrodesiccation And Curettage Text: The wound bed was treated with electrodesiccation and curettage after the biopsy was performed.
Cryotherapy Text: The wound bed was treated with cryotherapy after the biopsy was performed.

## 2022-05-31 NOTE — PROCEDURE: MIPS QUALITY
Quality 431: Preventive Care And Screening: Unhealthy Alcohol Use - Screening: Patient not identified as an unhealthy alcohol user when screened for unhealthy alcohol use using a systematic screening method
negative
Quality 110: Preventive Care And Screening: Influenza Immunization: Influenza Immunization previously received during influenza season
Quality 226: Preventive Care And Screening: Tobacco Use: Screening And Cessation Intervention: Patient screened for tobacco use and is an ex/non-smoker
Detail Level: Detailed
Quality 130: Documentation Of Current Medications In The Medical Record: Current Medications Documented

## 2022-05-31 NOTE — PROCEDURE: COUNSELING
Detail Level: Generalized
Detail Level: Detailed
Patient Specific Counseling (Will Not Stick From Patient To Patient): Itchy

## 2022-09-18 ENCOUNTER — HEALTH MAINTENANCE LETTER (OUTPATIENT)
Age: 32
End: 2022-09-18

## 2022-11-13 ENCOUNTER — MYC MEDICAL ADVICE (OUTPATIENT)
Dept: FAMILY MEDICINE | Facility: CLINIC | Age: 32
End: 2022-11-13

## 2022-11-14 NOTE — CONFIDENTIAL NOTE
[facility]ie message, pt asking how to find immunizations in [place].  Concha Ashley RN on 11/14/2022 at 2:00 PM

## 2023-04-09 ENCOUNTER — MYC MEDICAL ADVICE (OUTPATIENT)
Dept: FAMILY MEDICINE | Facility: CLINIC | Age: 33
End: 2023-04-09
Payer: COMMERCIAL

## 2023-04-09 DIAGNOSIS — Z30.012 ENCOUNTER FOR EMERGENCY CONTRACEPTION: Primary | ICD-10-CM

## 2023-04-10 ENCOUNTER — TELEPHONE (OUTPATIENT)
Dept: FAMILY MEDICINE | Facility: CLINIC | Age: 33
End: 2023-04-10
Payer: COMMERCIAL

## 2023-04-10 RX ORDER — LEVONORGESTREL 1.5 MG/1
1.5 TABLET ORAL ONCE
Qty: 1 TABLET | Refills: 0 | Status: SHIPPED | OUTPATIENT
Start: 2023-04-10 | End: 2024-07-05

## 2023-04-10 NOTE — TELEPHONE ENCOUNTER
Prior Authorization Request  Who s requesting:  Lynnette  Pharmacy Name and Location: [facility]  Medication Name: levonorgestrel 1.5 mg   Insurance Plan: medica  Insurance Member ID Number:  [id number]  CoverMyMeds Key: [id number]  Informed patient that prior authorizations can take up to 10 business days for response:   No  Okay to leave a detailed message:

## 2023-04-14 NOTE — TELEPHONE ENCOUNTER
Central Prior Authorization Team   Phone: [phone]      Prior Authorization Not Needed per Insurance    Medication: levonorgestrel (PLAN B) 1.5 MG tablet - PA NOT NEEDED  Insurance Company: [employer] - Phone [phone] Fax [phone]  Expected CoPay:      Pharmacy Filling the Rx: [id number] [address] AT [facility] [address]  Pharmacy Notified: Yes - verified pharmacy received paid claim  Patient Notified: Yes (patient already picked up)

## 2023-07-07 ENCOUNTER — APPOINTMENT (OUTPATIENT)
Dept: URBAN - METROPOLITAN AREA CLINIC 260 | Age: 33
Setting detail: DERMATOLOGY
End: 2023-07-08

## 2023-07-07 VITALS — HEIGHT: 55 IN | WEIGHT: 155 LBS

## 2023-07-07 DIAGNOSIS — L57.8 OTHER SKIN CHANGES DUE TO CHRONIC EXPOSURE TO NONIONIZING RADIATION: ICD-10-CM

## 2023-07-07 DIAGNOSIS — D49.2 NEOPLASM OF UNSPECIFIED BEHAVIOR OF BONE, SOFT TISSUE, AND SKIN: ICD-10-CM

## 2023-07-07 DIAGNOSIS — Z71.89 OTHER SPECIFIED COUNSELING: ICD-10-CM

## 2023-07-07 DIAGNOSIS — L82.1 OTHER SEBORRHEIC KERATOSIS: ICD-10-CM

## 2023-07-07 DIAGNOSIS — L81.4 OTHER MELANIN HYPERPIGMENTATION: ICD-10-CM

## 2023-07-07 DIAGNOSIS — L81.3 CAFÉ AU LAIT SPOTS: ICD-10-CM

## 2023-07-07 DIAGNOSIS — D18.0 HEMANGIOMA: ICD-10-CM

## 2023-07-07 DIAGNOSIS — D22 MELANOCYTIC NEVI: ICD-10-CM

## 2023-07-07 PROBLEM — D22.5 MELANOCYTIC NEVI OF TRUNK: Status: ACTIVE | Noted: 2023-07-07

## 2023-07-07 PROBLEM — D18.01 HEMANGIOMA OF SKIN AND SUBCUTANEOUS TISSUE: Status: ACTIVE | Noted: 2023-07-07

## 2023-07-07 PROBLEM — D22.72 MELANOCYTIC NEVI OF LEFT LOWER LIMB, INCLUDING HIP: Status: ACTIVE | Noted: 2023-07-07

## 2023-07-07 PROCEDURE — 11301 SHAVE SKIN LESION 0.6-1.0 CM: CPT

## 2023-07-07 PROCEDURE — OTHER COUNSELING: OTHER

## 2023-07-07 PROCEDURE — OTHER BIOPSY BY SHAVE METHOD: OTHER

## 2023-07-07 PROCEDURE — OTHER SHAVE REMOVAL: OTHER

## 2023-07-07 PROCEDURE — OTHER MIPS QUALITY: OTHER

## 2023-07-07 PROCEDURE — 11102 TANGNTL BX SKIN SINGLE LES: CPT | Mod: 59

## 2023-07-07 PROCEDURE — 99213 OFFICE O/P EST LOW 20 MIN: CPT | Mod: 25

## 2023-07-07 PROCEDURE — OTHER PHOTO-DOCUMENTATION: OTHER

## 2023-07-07 ASSESSMENT — LOCATION DETAILED DESCRIPTION DERM
LOCATION DETAILED: LEFT MEDIAL UPPER BACK
LOCATION DETAILED: RIGHT RIB CAGE
LOCATION DETAILED: LEFT SUPERIOR MEDIAL UPPER BACK
LOCATION DETAILED: LEFT ANTERIOR DISTAL THIGH
LOCATION DETAILED: LEFT INFERIOR UPPER BACK

## 2023-07-07 ASSESSMENT — LOCATION SIMPLE DESCRIPTION DERM
LOCATION SIMPLE: LEFT UPPER BACK
LOCATION SIMPLE: ABDOMEN
LOCATION SIMPLE: LEFT THIGH

## 2023-07-07 ASSESSMENT — LOCATION ZONE DERM
LOCATION ZONE: LEG
LOCATION ZONE: TRUNK

## 2023-07-07 NOTE — PROCEDURE: BIOPSY BY SHAVE METHOD

## 2023-07-07 NOTE — PROCEDURE: SHAVE REMOVAL
Path Notes Override (Will Replace All Of The Above Text): Please confirm margins. Accession# [id number] Path Notes Override (Will Replace All Of The Above Text): Please confirm margins. Accession# [id number]

## 2023-10-08 ENCOUNTER — HEALTH MAINTENANCE LETTER (OUTPATIENT)
Age: 33
End: 2023-10-08

## 2024-04-23 NOTE — PROCEDURE: SHAVE REMOVAL
Post-Care Instructions: I reviewed with the patient in detail post-care instructions. Patient is to keep the biopsy site dry overnight, and then apply bacitracin twice daily until healed. Patient may apply hydrogen peroxide soaks to remove any crusting. PAST SURGICAL HISTORY:  No significant past surgical history

## 2024-06-30 ENCOUNTER — OFFICE VISIT (OUTPATIENT)
Dept: FAMILY MEDICINE | Facility: CLINIC | Age: 34
End: 2024-06-30
Payer: COMMERCIAL

## 2024-06-30 VITALS
RESPIRATION RATE: 16 BRPM | SYSTOLIC BLOOD PRESSURE: 105 MMHG | HEART RATE: 77 BPM | DIASTOLIC BLOOD PRESSURE: 66 MMHG | OXYGEN SATURATION: 99 % | TEMPERATURE: 98.2 F

## 2024-06-30 DIAGNOSIS — R00.2 PALPITATIONS: Primary | ICD-10-CM

## 2024-06-30 LAB
ANION GAP SERPL CALCULATED.3IONS-SCNC: 9 MMOL/L (ref 7–15)
ATRIAL RATE - MUSE: 67 BPM
BASOPHILS # BLD AUTO: 0 10E3/UL (ref 0–0.2)
BASOPHILS NFR BLD AUTO: 1 %
BUN SERPL-MCNC: 15.4 MG/DL (ref 6–20)
CALCIUM SERPL-MCNC: 9.7 MG/DL (ref 8.6–10)
CHLORIDE SERPL-SCNC: 103 MMOL/L (ref 98–107)
CREAT SERPL-MCNC: 0.53 MG/DL (ref 0.51–0.95)
DEPRECATED HCO3 PLAS-SCNC: 28 MMOL/L (ref 22–29)
DIASTOLIC BLOOD PRESSURE - MUSE: NORMAL MMHG
EGFRCR SERPLBLD CKD-EPI 2021: >90 ML/MIN/1.73M2
EOSINOPHIL # BLD AUTO: 0.1 10E3/UL (ref 0–0.7)
EOSINOPHIL NFR BLD AUTO: 2 %
ERYTHROCYTE [DISTWIDTH] IN BLOOD BY AUTOMATED COUNT: 15.6 % (ref 10–15)
GLUCOSE SERPL-MCNC: 87 MG/DL (ref 70–99)
HCT VFR BLD AUTO: 37.6 % (ref 35–47)
HGB BLD-MCNC: 11.7 G/DL (ref 11.7–15.7)
IMM GRANULOCYTES # BLD: 0 10E3/UL
IMM GRANULOCYTES NFR BLD: 0 %
INTERPRETATION ECG - MUSE: NORMAL
LYMPHOCYTES # BLD AUTO: 1.6 10E3/UL (ref 0.8–5.3)
LYMPHOCYTES NFR BLD AUTO: 26 %
MCH RBC QN AUTO: 24.3 PG (ref 26.5–33)
MCHC RBC AUTO-ENTMCNC: 31.1 G/DL (ref 31.5–36.5)
MCV RBC AUTO: 78 FL (ref 78–100)
MONOCYTES # BLD AUTO: 0.7 10E3/UL (ref 0–1.3)
MONOCYTES NFR BLD AUTO: 11 %
NEUTROPHILS # BLD AUTO: 3.6 10E3/UL (ref 1.6–8.3)
NEUTROPHILS NFR BLD AUTO: 60 %
P AXIS - MUSE: 47 DEGREES
PLATELET # BLD AUTO: 275 10E3/UL (ref 150–450)
POTASSIUM SERPL-SCNC: 3.8 MMOL/L (ref 3.4–5.3)
PR INTERVAL - MUSE: 144 MS
QRS DURATION - MUSE: 88 MS
QT - MUSE: 416 MS
QTC - MUSE: 439 MS
R AXIS - MUSE: 38 DEGREES
RBC # BLD AUTO: 4.82 10E6/UL (ref 3.8–5.2)
SODIUM SERPL-SCNC: 140 MMOL/L (ref 135–145)
SYSTOLIC BLOOD PRESSURE - MUSE: NORMAL MMHG
T AXIS - MUSE: 27 DEGREES
TSH SERPL DL<=0.005 MIU/L-ACNC: 1.19 UIU/ML (ref 0.3–4.2)
VENTRICULAR RATE- MUSE: 67 BPM
WBC # BLD AUTO: 6 10E3/UL (ref 4–11)

## 2024-06-30 PROCEDURE — 93005 ELECTROCARDIOGRAM TRACING: CPT | Performed by: FAMILY MEDICINE

## 2024-06-30 PROCEDURE — 36415 COLL VENOUS BLD VENIPUNCTURE: CPT | Performed by: FAMILY MEDICINE

## 2024-06-30 PROCEDURE — 80048 BASIC METABOLIC PNL TOTAL CA: CPT | Performed by: FAMILY MEDICINE

## 2024-06-30 PROCEDURE — 93010 ELECTROCARDIOGRAM REPORT: CPT | Performed by: INTERNAL MEDICINE

## 2024-06-30 PROCEDURE — 99204 OFFICE O/P NEW MOD 45 MIN: CPT | Performed by: FAMILY MEDICINE

## 2024-06-30 PROCEDURE — 85025 COMPLETE CBC W/AUTO DIFF WBC: CPT | Performed by: FAMILY MEDICINE

## 2024-06-30 PROCEDURE — 84443 ASSAY THYROID STIM HORMONE: CPT | Performed by: FAMILY MEDICINE

## 2024-06-30 NOTE — PROGRESS NOTES
Assessment & Plan     Palpitations  Given new onset palpitations recommend monitoring of vitals at home. Home monitor order is placed. Baseline labs ordered.   - EKG 12-lead, tracing only  - Adult Cardiac Event Monitor  - Primary Care Referral  - TSH with free T4 reflex  - CBC with platelets and differential  - Basic metabolic panel  (Ca, Cl, CO2, Creat, Gluc, K, Na, BUN)  - TSH with free T4 reflex  - CBC with platelets and differential  - Basic metabolic panel  (Ca, Cl, CO2, Creat, Gluc, K, Na, BUN)             No follow-ups on file.    Rosa M Browning MD  [facility] is a 33 year old female who presents to clinic today for the following health issues:  Chief Complaint   Patient presents with    Palpitations     Noticed Friday afternoon off and on palpitations        HPI      Cardiac Event    Symptom Onset: 3 day(s) ago.  Timing of illness: sudden onset.  Current and Associated symptoms: palpitations.Denies pain.   Character: n/a.  Severity mild.  Associated Symptoms: stress/anxiety.  Exacerbated by: nothing.  Relieved by: nothing.  Cardiac risk factors: none.      Review of Systems  Constitutional, HEENT, cardiovascular, pulmonary, GI, , musculoskeletal, neuro, skin, endocrine and psych systems are negative, except as otherwise noted.      Objective    /66   Pulse 77   Temp 98.2  F (36.8  C) (Oral)   Resp 16   SpO2 99%   Physical Exam   GENERAL: alert and no distress  NECK: no adenopathy, no asymmetry, masses, or scars  RESP: lungs clear to auscultation - no rales, rhonchi or wheezes  CV: regular rate and rhythm, normal S1 S2, no S3 or S4, no murmur, click or rub, no peripheral edema  ABDOMEN: soft, nontender, no hepatosplenomegaly, no masses and bowel sounds normal  MS: no gross musculoskeletal defects noted, no edema    Results for orders placed or performed in visit on 06/30/24 (from the past 24 hour(s))   EKG 12-lead, tracing only   Result  Value Ref Range    Systolic Blood Pressure  mmHg    Diastolic Blood Pressure  mmHg    Ventricular Rate 67 BPM    Atrial Rate 67 BPM    TX Interval 144 ms    QRS Duration 88 ms     ms    QTc 439 ms    P Axis 47 degrees    R AXIS 38 degrees    T Axis 27 degrees    Interpretation ECG       Sinus rhythm  Normal ECG  No previous ECGs available     CBC with platelets and differential    Narrative    The following orders were created for panel order CBC with platelets and differential.  Procedure                               Abnormality         Status                     ---------                               -----------         ------                     CBC with platelets and d...[[id number]]  Abnormal            Final result                 Please view results for these tests on the individual orders.   CBC with platelets and differential   Result Value Ref Range    WBC Count 6.0 4.0 - 11.0 10e3/uL    RBC Count 4.82 3.80 - 5.20 10e6/uL    Hemoglobin 11.7 11.7 - 15.7 g/dL    Hematocrit 37.6 35.0 - 47.0 %    MCV 78 78 - 100 fL    MCH 24.3 (L) 26.5 - 33.0 pg    MCHC 31.1 (L) 31.5 - 36.5 g/dL    RDW 15.6 (H) 10.0 - 15.0 %    Platelet Count 275 150 - 450 10e3/uL    % Neutrophils 60 %    % Lymphocytes 26 %    % Monocytes 11 %    % Eosinophils 2 %    % Basophils 1 %    % Immature Granulocytes 0 %    Absolute Neutrophils 3.6 1.6 - 8.3 10e3/uL    Absolute Lymphocytes 1.6 0.8 - 5.3 10e3/uL    Absolute Monocytes 0.7 0.0 - 1.3 10e3/uL    Absolute Eosinophils 0.1 0.0 - 0.7 10e3/uL    Absolute Basophils 0.0 0.0 - 0.2 10e3/uL    Absolute Immature Granulocytes 0.0 <=0.4 10e3/uL

## 2024-07-03 ENCOUNTER — HOSPITAL ENCOUNTER (OUTPATIENT)
Dept: CARDIOLOGY | Facility: CLINIC | Age: 34
Discharge: HOME | End: 2024-07-03
Attending: FAMILY MEDICINE | Admitting: FAMILY MEDICINE
Payer: COMMERCIAL

## 2024-07-03 DIAGNOSIS — R00.2 PALPITATIONS: ICD-10-CM

## 2024-07-03 PROCEDURE — 93270 REMOTE 30 DAY ECG REV/REPORT: CPT

## 2024-07-05 ENCOUNTER — OFFICE VISIT (OUTPATIENT)
Dept: FAMILY MEDICINE | Facility: CLINIC | Age: 34
End: 2024-07-05
Payer: COMMERCIAL

## 2024-07-05 VITALS
DIASTOLIC BLOOD PRESSURE: 68 MMHG | OXYGEN SATURATION: 97 % | WEIGHT: 170.2 LBS | HEART RATE: 71 BPM | SYSTOLIC BLOOD PRESSURE: 110 MMHG | TEMPERATURE: 98.8 F | HEIGHT: 64 IN | RESPIRATION RATE: 16 BRPM | BODY MASS INDEX: 29.06 KG/M2

## 2024-07-05 DIAGNOSIS — R00.2 PALPITATIONS: ICD-10-CM

## 2024-07-05 DIAGNOSIS — Z00.00 ROUTINE GENERAL MEDICAL EXAMINATION AT A HEALTH CARE FACILITY: Primary | ICD-10-CM

## 2024-07-05 DIAGNOSIS — Z12.4 CERVICAL CANCER SCREENING: ICD-10-CM

## 2024-07-05 DIAGNOSIS — Z13.0 SCREENING, ANEMIA, DEFICIENCY, IRON: ICD-10-CM

## 2024-07-05 DIAGNOSIS — Z13.220 LIPID SCREENING: ICD-10-CM

## 2024-07-05 PROBLEM — Z87.763 HISTORY OF OMPHALOCELE: Status: RESOLVED | Noted: 2019-03-25 | Resolved: 2024-07-05

## 2024-07-05 PROBLEM — O09.629: Status: RESOLVED | Noted: 2019-04-26 | Resolved: 2024-07-05

## 2024-07-05 PROBLEM — R10.9 ABDOMINAL PAIN AFFECTING PREGNANCY: Status: RESOLVED | Noted: 2019-05-04 | Resolved: 2024-07-05

## 2024-07-05 PROBLEM — O47.00 PRETERM CONTRACTIONS: Status: RESOLVED | Noted: 2019-04-09 | Resolved: 2024-07-05

## 2024-07-05 PROBLEM — O60.03 PRETERM LABOR IN THIRD TRIMESTER: Status: RESOLVED | Noted: 2019-04-09 | Resolved: 2024-07-05

## 2024-07-05 PROBLEM — O26.899 ABDOMINAL PAIN AFFECTING PREGNANCY: Status: RESOLVED | Noted: 2019-05-04 | Resolved: 2024-07-05

## 2024-07-05 PROBLEM — Z36.89 ENCOUNTER FOR TRIAGE IN PREGNANT PATIENT: Status: RESOLVED | Noted: 2019-04-13 | Resolved: 2024-07-05

## 2024-07-05 PROBLEM — O36.63X0 MACROSOMIA OF FETUS AFFECTING MANAGEMENT OF MOTHER IN THIRD TRIMESTER, SINGLE OR UNSPECIFIED FETUS: Status: RESOLVED | Noted: 2019-03-23 | Resolved: 2024-07-05

## 2024-07-05 PROBLEM — O40.3XX0 POLYHYDRAMNIOS IN THIRD TRIMESTER COMPLICATION, SINGLE OR UNSPECIFIED FETUS: Status: RESOLVED | Noted: 2019-03-23 | Resolved: 2024-07-05

## 2024-07-05 LAB
CHOLEST SERPL-MCNC: 209 MG/DL
FASTING STATUS PATIENT QL REPORTED: ABNORMAL
FERRITIN SERPL-MCNC: 12 NG/ML (ref 6–175)
HDLC SERPL-MCNC: 79 MG/DL
LDLC SERPL CALC-MCNC: 114 MG/DL
NONHDLC SERPL-MCNC: 130 MG/DL
TRIGL SERPL-MCNC: 78 MG/DL

## 2024-07-05 PROCEDURE — 82728 ASSAY OF FERRITIN: CPT

## 2024-07-05 PROCEDURE — 87624 HPV HI-RISK TYP POOLED RSLT: CPT

## 2024-07-05 PROCEDURE — 99395 PREV VISIT EST AGE 18-39: CPT

## 2024-07-05 PROCEDURE — G0145 SCR C/V CYTO,THINLAYER,RESCR: HCPCS

## 2024-07-05 PROCEDURE — 99213 OFFICE O/P EST LOW 20 MIN: CPT | Mod: 25

## 2024-07-05 PROCEDURE — 36415 COLL VENOUS BLD VENIPUNCTURE: CPT

## 2024-07-05 PROCEDURE — 80061 LIPID PANEL: CPT

## 2024-07-05 SDOH — HEALTH STABILITY: PHYSICAL HEALTH: ON AVERAGE, HOW MANY DAYS PER WEEK DO YOU ENGAGE IN MODERATE TO STRENUOUS EXERCISE (LIKE A BRISK WALK)?: 2 DAYS

## 2024-07-05 ASSESSMENT — ANXIETY QUESTIONNAIRES
8. IF YOU CHECKED OFF ANY PROBLEMS, HOW DIFFICULT HAVE THESE MADE IT FOR YOU TO DO YOUR WORK, TAKE CARE OF THINGS AT HOME, OR GET ALONG WITH OTHER PEOPLE?: NOT DIFFICULT AT ALL
3. WORRYING TOO MUCH ABOUT DIFFERENT THINGS: NOT AT ALL
GAD7 TOTAL SCORE: 0
1. FEELING NERVOUS, ANXIOUS, OR ON EDGE: NOT AT ALL
2. NOT BEING ABLE TO STOP OR CONTROL WORRYING: NOT AT ALL
7. FEELING AFRAID AS IF SOMETHING AWFUL MIGHT HAPPEN: NOT AT ALL
GAD7 TOTAL SCORE: 0
6. BECOMING EASILY ANNOYED OR IRRITABLE: NOT AT ALL
7. FEELING AFRAID AS IF SOMETHING AWFUL MIGHT HAPPEN: NOT AT ALL
4. TROUBLE RELAXING: NOT AT ALL
GAD7 TOTAL SCORE: 0
5. BEING SO RESTLESS THAT IT IS HARD TO SIT STILL: NOT AT ALL
IF YOU CHECKED OFF ANY PROBLEMS ON THIS QUESTIONNAIRE, HOW DIFFICULT HAVE THESE PROBLEMS MADE IT FOR YOU TO DO YOUR WORK, TAKE CARE OF THINGS AT HOME, OR GET ALONG WITH OTHER PEOPLE: NOT DIFFICULT AT ALL

## 2024-07-05 ASSESSMENT — PAIN SCALES - GENERAL: PAINLEVEL: NO PAIN (0)

## 2024-07-05 ASSESSMENT — PATIENT HEALTH QUESTIONNAIRE - PHQ9
SUM OF ALL RESPONSES TO PHQ QUESTIONS 1-9: 0
SUM OF ALL RESPONSES TO PHQ QUESTIONS 1-9: 0
10. IF YOU CHECKED OFF ANY PROBLEMS, HOW DIFFICULT HAVE THESE PROBLEMS MADE IT FOR YOU TO DO YOUR WORK, TAKE CARE OF THINGS AT HOME, OR GET ALONG WITH OTHER PEOPLE: NOT DIFFICULT AT ALL

## 2024-07-05 ASSESSMENT — SOCIAL DETERMINANTS OF HEALTH (SDOH): HOW OFTEN DO YOU GET TOGETHER WITH FRIENDS OR RELATIVES?: PATIENT DECLINED

## 2024-07-05 NOTE — PATIENT INSTRUCTIONS
Patient Education   Preventive Care Advice   This is general advice given by our system to help you stay healthy. However, your care team may have specific advice just for you. Please talk to your care team about your preventive care needs.  Nutrition  Eat 5 or more servings of fruits and vegetables each day.  Try wheat bread, brown rice and whole grain pasta (instead of white bread, rice, and pasta).  Get enough calcium and vitamin D. Check the label on foods and aim for 100% of the RDA (recommended daily allowance).  Lifestyle  Exercise at least 150 minutes each week  (30 minutes a day, 5 days a week).  Do muscle strengthening activities 2 days a week. These help control your weight and prevent disease.  No smoking.  Wear sunscreen to prevent skin cancer.  Have a dental exam and cleaning every 6 months.  Yearly exams  See your health care team every year to talk about:  Any changes in your health.  Any medicines your care team has prescribed.  Preventive care, family planning, and ways to prevent chronic diseases.  Shots (vaccines)   HPV shots (up to age 26), if you've never had them before.  Hepatitis B shots (up to age 59), if you've never had them before.  COVID-19 shot: Get this shot when it's due.  Flu shot: Get a flu shot every year.  Tetanus shot: Get a tetanus shot every 10 years.  Pneumococcal, hepatitis A, and RSV shots: Ask your care team if you need these based on your risk.  Shingles shot (for age 50 and up)  General health tests  Diabetes screening:  Starting at age 35, Get screened for diabetes at least every 3 years.  If you are younger than age 35, ask your care team if you should be screened for diabetes.  Cholesterol test: At age 39, start having a cholesterol test every 5 years, or more often if advised.  Bone density scan (DEXA): At age 50, ask your care team if you should have this scan for osteoporosis (brittle bones).  Hepatitis C: Get tested at least once in your life.  STIs (sexually  transmitted infections)  Before age 24: Ask your care team if you should be screened for STIs.  After age 24: Get screened for STIs if you're at risk. You are at risk for STIs (including HIV) if:  You are sexually active with more than one person.  You don't use condoms every time.  You or a partner was diagnosed with a sexually transmitted infection.  If you are at risk for HIV, ask about PrEP medicine to prevent HIV.  Get tested for HIV at least once in your life, whether you are at risk for HIV or not.  Cancer screening tests  Cervical cancer screening: If you have a cervix, begin getting regular cervical cancer screening tests starting at age 21.  Breast cancer scan (mammogram): If you've ever had breasts, begin having regular mammograms starting at age 40. This is a scan to check for breast cancer.  Colon cancer screening: It is important to start screening for colon cancer at age 45.  Have a colonoscopy test every 10 years (or more often if you're at risk) Or, ask your provider about stool tests like a FIT test every year or Cologuard test every 3 years.  To learn more about your testing options, visit:   .  For help making a decision, visit:   https://bit.ly/pf98878.  Prostate cancer screening test: If you have a prostate, ask your care team if a prostate cancer screening test (PSA) at age 55 is right for you.  Lung cancer screening: If you are a current or former smoker ages 50 to 80, ask your care team if ongoing lung cancer screenings are right for you.  For informational purposes only. Not to replace the advice of your health care provider. Copyright   2023 [facility]. All rights reserved. Clinically reviewed by the [facility]. [employer] [id number] - REV 01/24.  Substance Use Disorder: Care Instructions  Overview     You can improve your life and health by stopping your use of alcohol or drugs. When you don't drink or use drugs, you may feel and sleep better. You may  get along better with your family, friends, and coworkers. There are medicines and programs that can help with substance use disorder.  How can you care for yourself at home?  Here are some ways to help you stay sober and prevent relapse.  If you have been given medicine to help keep you sober or reduce your cravings, be sure to take it exactly as prescribed.  Talk to your doctor about programs that can help you stop using drugs or drinking alcohol.  Do not keep alcohol or drugs in your home.  Plan ahead. Think about what you'll say if other people ask you to drink or use drugs. Try not to spend time with people who drink or use drugs.  Use the time and money spent on drinking or drugs to do something that's important to you.  Preventing a relapse  Have a plan to deal with relapse. Learn to recognize changes in your thinking that lead you to drink or use drugs. Get help before you start to drink or use drugs again.  Try to stay away from situations, friends, or places that may lead you to drink or use drugs.  If you feel the need to drink alcohol or use drugs again, seek help right away. Call a trusted friend or family member. Some people get support from organizations such as Narcotics Anonymous or [facility] or from treatment facilities.  If you relapse, get help as soon as you can. Some people make a plan with another person that outlines what they want that person to do for them if they relapse. The plan usually includes how to handle the relapse and who to notify in case of relapse.  Don't give up. Remember that a relapse doesn't mean that you have failed. Use the experience to learn the triggers that lead you to drink or use drugs. Then quit again. Recovery is a lifelong process. Many people have several relapses before they are able to quit for good.  Follow-up care is a key part of your treatment and safety. Be sure to make and go to all appointments, and call your doctor if you are having problems. It's  "also a good idea to know your test results and keep a list of the medicines you take.  When should you call for help?   Call 911  anytime you think you may need emergency care. For example, call if you or someone else:    Has overdosed or has withdrawal signs. Be sure to tell the emergency workers that you are or someone else is using or trying to quit using drugs. Overdose or withdrawal signs may include:  Losing consciousness.  Seizure.  Seeing or hearing things that aren't there (hallucinations).     Is thinking or talking about suicide or harming others.   Where to get help 24 hours a day, 7 days a week   If you or someone you know talks about suicide, self-harm, a mental health crisis, a substance use crisis, or any other kind of emotional distress, get help right away. You can:    Call the Suicide and Crisis Lifeline at 988.     Call 0-185-129-TALK (1-103.243.1577).     Text HOME to 583153 to access the Crisis Text Line.   Consider saving these numbers in your phone.  Go to [employer] for more information or to chat online.  Call your doctor now or seek immediate medical care if:    You are having withdrawal symptoms. These may include nausea or vomiting, sweating, shakiness, and anxiety.   Watch closely for changes in your health, and be sure to contact your doctor if:    You have a relapse.     You need more help or support to stop.   Where can you learn more?  Go to https://www.[employer].net/patiented  Enter H573 in the search box to learn more about \"Substance Use Disorder: Care Instructions.\"  Current as of: November 15, 2023               Content Version: 14.0    1641-7797 [employer].   Care instructions adapted under license by your healthcare professional. If you have questions about a medical condition or this instruction, always ask your healthcare professional. [employer] disclaims any warranty or liability for your use of this information.         "

## 2024-07-05 NOTE — PROGRESS NOTES
"Preventive Care Visit  [facility]  Afsaneh Fuentes PA-C, Family Medicine  Jul 5, 2024      Assessment & Plan     Routine general medical examination at a health care facility  Patient seen today for annual physical exam.  Routine health maintenance reviewed.   Vaccinations: Patient declines COVID vaccines today.  Cervical cancer screening: Due today   Acute and chronic concerns addressed below.   - REVIEW OF HEALTH MAINTENANCE PROTOCOL ORDERS  - PRIMARY CARE FOLLOW-UP SCHEDULING    Palpitations  Seen on 6/30/2024 for heart palpitations.  EKG at that time returned normal sinus rhythm.  TSH, BMP, CBC returned largely within normal limits.  She had a Zio patch placed on 7/3/2024.  She is having some pruritus secondary to the adhesive, educated to try over-the-counter Zyrtec or other over-the-counter allergy medication and hydrocortisone cream around the area.  Educated patient to go as long as she could tolerate with the Zio patch on so we can collect as much information as possible.  Patient is otherwise been asymptomatic since the night of 6/30/2024.    Cervical cancer screening  Completed today.  No history of abnormal Pap smears.  - Pap Screen with HPV - Recommended Age 30 - 65 Years    Screening, anemia, deficiency, iron  On 6/30/2024 hemoglobin 11.7, decreased MCH and increased RDW.  Will go ahead and check a ferritin level today to see if patient would benefit from iron supplementation.  - Ferritin  - Ferritin    Lipid screening  Patient is not fasting today.  - Lipid panel reflex to direct LDL Non-fasting    Patient has been advised of split billing requirements and indicates understanding: Yes      BMI  Estimated body mass index is 29.21 kg/m  as calculated from the following:    Height as of this encounter: 1.626 m (5' 4\").    Weight as of this encounter: 77.2 kg (170 lb 3.2 oz).   Weight management plan: Discussed healthy diet and exercise guidelines    Counseling  Appropriate " preventive services were discussed with this patient, including applicable screening as appropriate for fall prevention, nutrition, physical activity, Tobacco-use cessation, weight loss and cognition.  Checklist reviewing preventive services available has been given to the patient.  Reviewed patient's diet, addressing concerns and/or questions.   She is at risk for lack of exercise and has been provided with information to increase physical activity for the benefit of her well-being.   She is at risk for psychosocial distress and has been provided with information to reduce risk.       Adalberto Coulter is a 33 year old, presenting for the following:  Physical (Pt is not fasting. Pt currently wearing heart monitor after urgent care visit. Symptoms have subsided, pt having mild reaction to adhesive. )        7/5/2024     3:01 PM   Additional Questions   Roomed by Rosa M WISE LPN        Health Care Directive  Patient does not have a Health Care Directive or Living Will: Discussed advance care planning with patient; information given to patient to review.    HPI    Never tested for Kenyatta-Wiedemann syndrome - reports her children were screened. History omphalocele which was repaired at birth.     Intermittently taking vitamin D. Is taking a daily MVI+min     Seen on 6/30/24 for heart palpitations. EKG returned with NSR. TSH, CBC, BMP largely within normal limits. She has a ziopatch on - notes this is irritating her skin - reports a history of easily irritable skin with adhesives. Has not had any s/s of palpitations since the night of 6/30/24.    She otherwise has no other acute concerns today.          7/5/2024   General Health   How would you rate your overall physical health? Good   Feel stress (tense, anxious, or unable to sleep) Only a little      (!) STRESS CONCERN      7/5/2024   Nutrition   Three or more servings of calcium each day? Yes   Diet: Regular (no restrictions)   How many servings of fruit and  vegetables per day? (!) 2-3   How many sweetened beverages each day? 0-1            7/5/2024   Exercise   Days per week of moderate/strenous exercise 2 days      (!) EXERCISE CONCERN      7/5/2024   Social Factors   Frequency of gathering with friends or relatives Patient declined   Worry food won't last until get money to buy more No   Food not last or not have enough money for food? No   Do you have housing? (Housing is defined as stable permanent housing and does not include staying ouside in a car, in a tent, in an abandoned building, in an overnight shelter, or couch-surfing.) Yes   Are you worried about losing your housing? No   Lack of transportation? No   Unable to get utilities (heat,electricity)? No            7/5/2024   Dental   Dentist two times every year? Yes            7/5/2024   TB Screening   Were you born outside of the [place]? No          Today's PHQ-9 Score:       7/5/2024     2:49 PM   PHQ-9 SCORE   PHQ-9 Total Score MyChart 0   PHQ-9 Total Score 0         7/5/2024   Substance Use   Alcohol more than 3/day or more than 7/wk Not Applicable   Do you use any other substances recreationally? (!) OTHER        Social History     Tobacco Use    Smoking status: Never     Passive exposure: Never    Smokeless tobacco: Never   Vaping Use    Vaping status: Never Used   Substance Use Topics    Alcohol use: No     Comment: Alcoholic Drinks/day: occ prior to pregnancy    Drug use: No          Mammogram Screening - Patient under 40 years of age: Routine Mammogram Screening not recommended.         7/5/2024   STI Screening   New sexual partner(s) since last STI/HIV test? No        History of abnormal Pap smear: No - age 30- 64 PAP with HPV every 5 years recommended        Latest Ref Rng & Units 7/2/2019     3:39 PM 11/2/2018     5:04 PM 8/1/2016     4:20 PM   PAP / HPV   PAP Negative for squamous intraepithelial lesion or malignancy. Negative for squamous intraepithelial lesion or malignancy  Electronically signed  "by Marilu Amaral CT (ASCP) on 7/3/2019 at  2:45 PM    Negative for squamous intraepithelial lesion or malignancy  Electronically signed by Marilu Amaral CT (ASCP) on 11/11/2018 at  4:45 PM    Negative for squamous intraepithelial lesion or malignancy  Electronically signed by Concha Osei CT (ASCP) on 8/9/2016 at  1:33 PM      HPV 16 DNA NEG  Negative     HPV 18 DNA NEG  Negative     Other HR HPV NEG  Negative             7/5/2024   Contraception/Family Planning   Questions about contraception or family planning No           Reviewed and updated as needed this visit by Provider                  BP Readings from Last 3 Encounters:   07/05/24 110/68   06/30/24 105/66   06/04/21 118/68    Wt Readings from Last 3 Encounters:   07/05/24 77.2 kg (170 lb 3.2 oz)   06/04/21 71.4 kg (157 lb 6.4 oz)   05/28/21 69.9 kg (154 lb)         Review of Systems  Constitutional, neuro, ENT, endocrine, pulmonary, cardiac, gastrointestinal, genitourinary, musculoskeletal, integument and psychiatric systems are negative, except as otherwise noted.     Objective    Exam  /68 (BP Location: Right arm, Patient Position: Sitting, Cuff Size: Adult Regular)   Pulse 71   Temp 98.8  F (37.1  C) (Oral)   Resp 16   Ht 1.626 m (5' 4\")   Wt 77.2 kg (170 lb 3.2 oz)   LMP 06/18/2024 (Exact Date)   SpO2 97%   Breastfeeding No   BMI 29.21 kg/m     Estimated body mass index is 29.21 kg/m  as calculated from the following:    Height as of this encounter: 1.626 m (5' 4\").    Weight as of this encounter: 77.2 kg (170 lb 3.2 oz).    PHYSICAL EXAM  GENERAL: alert and no distress  EYES: Eyes grossly normal to inspection, conjunctivae and sclerae normal  HENT: ear canals and TM's normal, nose and mouth without ulcers or lesions  NECK: no adenopathy, no asymmetry, masses, or scars  RESP: lungs clear to auscultation - no rales, rhonchi or wheezes  CV: regular rate and rhythm, normal S1 S2, no S3 or S4, no murmur, click or rub, " no peripheral edema  ABDOMEN: soft, nontender, no hepatosplenomegaly, no masses    (female): normal female external genitalia, normal urethral meatus, normal vaginal mucos   MS: no gross musculoskeletal defects noted, no edema  SKIN: no suspicious lesions or rashes  NEURO: Normal strength and tone, mentation intact and speech normal  PSYCH: mentation appears normal, affect normal/bright      Signed Electronically by: Afsaneh Fuentes PA-C    Answers submitted by the patient for this visit:  Patient Health Questionnaire (Submitted on 7/5/2024)  If you checked off any problems, how difficult have these problems made it for you to do your work, take care of things at home, or get along with other people?: Not difficult at all  PHQ9 TOTAL SCORE: 0  LINDA-7 (Submitted on 7/5/2024)  LINDA 7 TOTAL SCORE: 0

## 2024-07-08 ENCOUNTER — MYC MEDICAL ADVICE (OUTPATIENT)
Dept: FAMILY MEDICINE | Facility: CLINIC | Age: 34
End: 2024-07-08
Payer: COMMERCIAL

## 2024-07-08 NOTE — TELEPHONE ENCOUNTER
Spoke with patient to recommend how much iron she should take per day. She verbalized understanding and had no further questions. The rest of the note left by the patient is an update for the provider. Will route as an FYI.     Franco Ogden RN  [facility]

## 2024-07-09 LAB
HPV HR 12 DNA CVX QL NAA+PROBE: NEGATIVE
HPV16 DNA CVX QL NAA+PROBE: NEGATIVE
HPV18 DNA CVX QL NAA+PROBE: NEGATIVE
HUMAN PAPILLOMA VIRUS FINAL DIAGNOSIS: NORMAL

## 2024-07-11 LAB
BKR LAB AP GYN ADEQUACY: NORMAL
BKR LAB AP GYN INTERPRETATION: NORMAL
BKR LAB AP PREVIOUS ABNORMAL: NORMAL
PATH REPORT.COMMENTS IMP SPEC: NORMAL
PATH REPORT.COMMENTS IMP SPEC: NORMAL
PATH REPORT.RELEVANT HX SPEC: NORMAL

## 2024-07-25 PROCEDURE — 93272 ECG/REVIEW INTERPRET ONLY: CPT | Performed by: INTERNAL MEDICINE

## 2024-09-04 ENCOUNTER — APPOINTMENT (OUTPATIENT)
Dept: URBAN - METROPOLITAN AREA CLINIC 260 | Age: 34
Setting detail: DERMATOLOGY
End: 2024-09-04

## 2024-09-04 DIAGNOSIS — L82.1 OTHER SEBORRHEIC KERATOSIS: ICD-10-CM

## 2024-09-04 DIAGNOSIS — D22 MELANOCYTIC NEVI: ICD-10-CM

## 2024-09-04 DIAGNOSIS — L81.3 CAFÉ AU LAIT SPOTS: ICD-10-CM

## 2024-09-04 DIAGNOSIS — D18.0 HEMANGIOMA: ICD-10-CM

## 2024-09-04 DIAGNOSIS — Z71.89 OTHER SPECIFIED COUNSELING: ICD-10-CM

## 2024-09-04 DIAGNOSIS — L81.4 OTHER MELANIN HYPERPIGMENTATION: ICD-10-CM

## 2024-09-04 DIAGNOSIS — D49.2 NEOPLASM OF UNSPECIFIED BEHAVIOR OF BONE, SOFT TISSUE, AND SKIN: ICD-10-CM

## 2024-09-04 PROBLEM — D18.01 HEMANGIOMA OF SKIN AND SUBCUTANEOUS TISSUE: Status: ACTIVE | Noted: 2024-09-04

## 2024-09-04 PROBLEM — D22.5 MELANOCYTIC NEVI OF TRUNK: Status: ACTIVE | Noted: 2024-09-04

## 2024-09-04 PROCEDURE — 11102 TANGNTL BX SKIN SINGLE LES: CPT

## 2024-09-04 PROCEDURE — OTHER BIOPSY BY SHAVE METHOD: OTHER

## 2024-09-04 PROCEDURE — OTHER MIPS QUALITY: OTHER

## 2024-09-04 PROCEDURE — OTHER COUNSELING: OTHER

## 2024-09-04 PROCEDURE — 99213 OFFICE O/P EST LOW 20 MIN: CPT | Mod: 25

## 2024-09-04 ASSESSMENT — LOCATION SIMPLE DESCRIPTION DERM
LOCATION SIMPLE: UPPER BACK
LOCATION SIMPLE: LEFT THIGH
LOCATION SIMPLE: ABDOMEN
LOCATION SIMPLE: LOWER BACK

## 2024-09-04 ASSESSMENT — LOCATION DETAILED DESCRIPTION DERM
LOCATION DETAILED: SUPERIOR LUMBAR SPINE
LOCATION DETAILED: LEFT ANTERIOR LATERAL PROXIMAL THIGH
LOCATION DETAILED: LEFT LATERAL ABDOMEN
LOCATION DETAILED: INFERIOR THORACIC SPINE

## 2024-09-04 ASSESSMENT — LOCATION ZONE DERM
LOCATION ZONE: LEG
LOCATION ZONE: TRUNK

## 2024-09-04 NOTE — PROCEDURE: BIOPSY BY SHAVE METHOD
Detail Level: Detailed
Depth Of Biopsy: dermis
Was A Bandage Applied: Yes
Size Of Lesion In Cm: 0
Biopsy Type: H and E
Biopsy Method: Dermablade
Anesthesia Type: 1% lidocaine with epinephrine
Anesthesia Volume In Cc: 0.5
Hemostasis: Drysol
Wound Care: Petrolatum
Dressing: bandage
Destruction After The Procedure: No
Type Of Destruction Used: Curettage
Curettage Text: The wound bed was treated with curettage after the biopsy was performed.
Cryotherapy Text: The wound bed was treated with cryotherapy after the biopsy was performed.
Electrodesiccation Text: The wound bed was treated with electrodesiccation after the biopsy was performed.
Electrodesiccation And Curettage Text: The wound bed was treated with electrodesiccation and curettage after the biopsy was performed.
Silver Nitrate Text: The wound bed was treated with silver nitrate after the biopsy was performed.
Lab: -[id number]
Consent: Written consent was obtained and risks were reviewed including but not limited to scarring, infection, bleeding, scabbing, incomplete removal, nerve damage and allergy to anesthesia.
Post-Care Instructions: I reviewed with the patient in detail post-care instructions. Patient is to keep the biopsy site dry overnight, and then apply bacitracin twice daily until healed. Patient may apply hydrogen peroxide soaks to remove any crusting.
Notification Instructions: Patient will be notified of biopsy results. However, patient instructed to call the office if not contacted within 2 weeks.
Billing Type: Third-Party Bill
Information: Selecting Yes will display possible errors in your note based on the variables you have selected. This validation is only offered as a suggestion for you. PLEASE NOTE THAT THE VALIDATION TEXT WILL BE REMOVED WHEN YOU FINALIZE YOUR NOTE. IF YOU WANT TO FAX A PRELIMINARY NOTE YOU WILL NEED TO TOGGLE THIS TO 'NO' IF YOU DO NOT WANT IT IN YOUR FAXED NOTE.

## 2025-05-29 ENCOUNTER — APPOINTMENT (OUTPATIENT)
Dept: URBAN - METROPOLITAN AREA CLINIC 260 | Age: 35
Setting detail: DERMATOLOGY
End: 2025-05-29

## 2025-05-29 VITALS — RESPIRATION RATE: 15 BRPM | WEIGHT: 165 LBS | HEIGHT: 55 IN

## 2025-05-29 DIAGNOSIS — D22 MELANOCYTIC NEVI: ICD-10-CM

## 2025-05-29 PROBLEM — D22.5 MELANOCYTIC NEVI OF TRUNK: Status: ACTIVE | Noted: 2025-05-29

## 2025-05-29 PROCEDURE — OTHER COUNSELING: OTHER

## 2025-05-29 PROCEDURE — OTHER SHAVE REMOVAL: OTHER

## 2025-05-29 PROCEDURE — 11300 SHAVE SKIN LESION 0.5 CM/<: CPT

## 2025-05-29 PROCEDURE — OTHER MIPS QUALITY: OTHER

## 2025-05-29 ASSESSMENT — LOCATION SIMPLE DESCRIPTION DERM: LOCATION SIMPLE: ABDOMEN

## 2025-05-29 ASSESSMENT — LOCATION DETAILED DESCRIPTION DERM: LOCATION DETAILED: LEFT LATERAL ABDOMEN

## 2025-05-29 ASSESSMENT — LOCATION ZONE DERM: LOCATION ZONE: TRUNK

## 2025-06-05 ENCOUNTER — PATIENT OUTREACH (OUTPATIENT)
Dept: CARE COORDINATION | Facility: CLINIC | Age: 35
End: 2025-06-05
Payer: COMMERCIAL

## 2025-08-09 ENCOUNTER — HEALTH MAINTENANCE LETTER (OUTPATIENT)
Age: 35
End: 2025-08-09